# Patient Record
Sex: MALE | Race: WHITE | NOT HISPANIC OR LATINO
[De-identification: names, ages, dates, MRNs, and addresses within clinical notes are randomized per-mention and may not be internally consistent; named-entity substitution may affect disease eponyms.]

---

## 2024-03-26 ENCOUNTER — TRANSCRIPTION ENCOUNTER (OUTPATIENT)
Age: 69
End: 2024-03-26

## 2024-03-26 ENCOUNTER — INPATIENT (INPATIENT)
Facility: HOSPITAL | Age: 69
LOS: 7 days | Discharge: HOME HEALTH | DRG: 617 | End: 2024-04-03
Attending: INTERNAL MEDICINE | Admitting: GENERAL ACUTE CARE HOSPITAL
Payer: MEDICARE

## 2024-03-26 VITALS
TEMPERATURE: 98 F | SYSTOLIC BLOOD PRESSURE: 130 MMHG | HEART RATE: 87 BPM | WEIGHT: 229.94 LBS | RESPIRATION RATE: 17 BRPM | DIASTOLIC BLOOD PRESSURE: 57 MMHG | HEIGHT: 65 IN | OXYGEN SATURATION: 100 %

## 2024-03-26 DIAGNOSIS — E87.20 ACIDOSIS, UNSPECIFIED: ICD-10-CM

## 2024-03-26 DIAGNOSIS — M86.9 OSTEOMYELITIS, UNSPECIFIED: ICD-10-CM

## 2024-03-26 DIAGNOSIS — Z29.9 ENCOUNTER FOR PROPHYLACTIC MEASURES, UNSPECIFIED: ICD-10-CM

## 2024-03-26 DIAGNOSIS — Z89.429 ACQUIRED ABSENCE OF OTHER TOE(S), UNSPECIFIED SIDE: Chronic | ICD-10-CM

## 2024-03-26 DIAGNOSIS — I73.9 PERIPHERAL VASCULAR DISEASE, UNSPECIFIED: ICD-10-CM

## 2024-03-26 DIAGNOSIS — I48.0 PAROXYSMAL ATRIAL FIBRILLATION: ICD-10-CM

## 2024-03-26 DIAGNOSIS — E11.9 TYPE 2 DIABETES MELLITUS WITHOUT COMPLICATIONS: ICD-10-CM

## 2024-03-26 DIAGNOSIS — J44.9 CHRONIC OBSTRUCTIVE PULMONARY DISEASE, UNSPECIFIED: ICD-10-CM

## 2024-03-26 DIAGNOSIS — Z95.2 PRESENCE OF PROSTHETIC HEART VALVE: ICD-10-CM

## 2024-03-26 DIAGNOSIS — N18.9 CHRONIC KIDNEY DISEASE, UNSPECIFIED: ICD-10-CM

## 2024-03-26 DIAGNOSIS — Z85.038 PERSONAL HISTORY OF OTHER MALIGNANT NEOPLASM OF LARGE INTESTINE: ICD-10-CM

## 2024-03-26 DIAGNOSIS — N17.9 ACUTE KIDNEY FAILURE, UNSPECIFIED: ICD-10-CM

## 2024-03-26 DIAGNOSIS — D64.9 ANEMIA, UNSPECIFIED: ICD-10-CM

## 2024-03-26 DIAGNOSIS — N18.5 CHRONIC KIDNEY DISEASE, STAGE 5: ICD-10-CM

## 2024-03-26 DIAGNOSIS — I48.20 CHRONIC ATRIAL FIBRILLATION, UNSPECIFIED: ICD-10-CM

## 2024-03-26 LAB
A1C WITH ESTIMATED AVERAGE GLUCOSE RESULT: 5 % — SIGNIFICANT CHANGE UP (ref 4–5.6)
ADD ON TEST-SPECIMEN IN LAB: SIGNIFICANT CHANGE UP
ALBUMIN SERPL ELPH-MCNC: 3.5 G/DL — SIGNIFICANT CHANGE UP (ref 3.3–5)
ALP SERPL-CCNC: 78 U/L — SIGNIFICANT CHANGE UP (ref 40–120)
ALT FLD-CCNC: 13 U/L — SIGNIFICANT CHANGE UP (ref 10–45)
ANION GAP SERPL CALC-SCNC: 13 MMOL/L — SIGNIFICANT CHANGE UP (ref 5–17)
ANION GAP SERPL CALC-SCNC: 16 MMOL/L — SIGNIFICANT CHANGE UP (ref 5–17)
AST SERPL-CCNC: 18 U/L — SIGNIFICANT CHANGE UP (ref 10–40)
BASOPHILS # BLD AUTO: 0.03 K/UL — SIGNIFICANT CHANGE UP (ref 0–0.2)
BASOPHILS NFR BLD AUTO: 0.3 % — SIGNIFICANT CHANGE UP (ref 0–2)
BILIRUB SERPL-MCNC: 0.2 MG/DL — SIGNIFICANT CHANGE UP (ref 0.2–1.2)
BLD GP AB SCN SERPL QL: NEGATIVE — SIGNIFICANT CHANGE UP
BUN SERPL-MCNC: 77 MG/DL — HIGH (ref 7–23)
BUN SERPL-MCNC: 81 MG/DL — HIGH (ref 7–23)
CALCIUM SERPL-MCNC: 8.6 MG/DL — SIGNIFICANT CHANGE UP (ref 8.4–10.5)
CALCIUM SERPL-MCNC: 8.6 MG/DL — SIGNIFICANT CHANGE UP (ref 8.4–10.5)
CHLORIDE SERPL-SCNC: 102 MMOL/L — SIGNIFICANT CHANGE UP (ref 96–108)
CHLORIDE SERPL-SCNC: 106 MMOL/L — SIGNIFICANT CHANGE UP (ref 96–108)
CO2 SERPL-SCNC: 14 MMOL/L — LOW (ref 22–31)
CO2 SERPL-SCNC: 16 MMOL/L — LOW (ref 22–31)
CREAT SERPL-MCNC: 3.97 MG/DL — HIGH (ref 0.5–1.3)
CREAT SERPL-MCNC: 4.2 MG/DL — HIGH (ref 0.5–1.3)
CRP SERPL-MCNC: 43.3 MG/L — HIGH (ref 0–4)
CRP SERPL-MCNC: 46.5 MG/L — HIGH (ref 0–4)
EGFR: 15 ML/MIN/1.73M2 — LOW
EGFR: 16 ML/MIN/1.73M2 — LOW
EOSINOPHIL # BLD AUTO: 0.09 K/UL — SIGNIFICANT CHANGE UP (ref 0–0.5)
EOSINOPHIL NFR BLD AUTO: 0.9 % — SIGNIFICANT CHANGE UP (ref 0–6)
ERYTHROCYTE [SEDIMENTATION RATE] IN BLOOD: 87 MM/HR — HIGH
ESTIMATED AVERAGE GLUCOSE: 97 MG/DL — SIGNIFICANT CHANGE UP (ref 68–114)
GLUCOSE BLDC GLUCOMTR-MCNC: 120 MG/DL — HIGH (ref 70–99)
GLUCOSE BLDC GLUCOMTR-MCNC: 129 MG/DL — HIGH (ref 70–99)
GLUCOSE BLDC GLUCOMTR-MCNC: 162 MG/DL — HIGH (ref 70–99)
GLUCOSE BLDC GLUCOMTR-MCNC: 177 MG/DL — HIGH (ref 70–99)
GLUCOSE SERPL-MCNC: 107 MG/DL — HIGH (ref 70–99)
GLUCOSE SERPL-MCNC: 133 MG/DL — HIGH (ref 70–99)
HCT VFR BLD CALC: 24 % — LOW (ref 39–50)
HCT VFR BLD CALC: 24.6 % — LOW (ref 39–50)
HGB BLD-MCNC: 7.5 G/DL — LOW (ref 13–17)
HGB BLD-MCNC: 7.8 G/DL — LOW (ref 13–17)
IMM GRANULOCYTES NFR BLD AUTO: 0.5 % — SIGNIFICANT CHANGE UP (ref 0–0.9)
LACTATE SERPL-SCNC: 0.5 MMOL/L — SIGNIFICANT CHANGE UP (ref 0.5–2)
LYMPHOCYTES # BLD AUTO: 2.67 K/UL — SIGNIFICANT CHANGE UP (ref 1–3.3)
LYMPHOCYTES # BLD AUTO: 25.5 % — SIGNIFICANT CHANGE UP (ref 13–44)
MAGNESIUM SERPL-MCNC: 1.9 MG/DL — SIGNIFICANT CHANGE UP (ref 1.6–2.6)
MCHC RBC-ENTMCNC: 28.1 PG — SIGNIFICANT CHANGE UP (ref 27–34)
MCHC RBC-ENTMCNC: 28.6 PG — SIGNIFICANT CHANGE UP (ref 27–34)
MCHC RBC-ENTMCNC: 31.3 GM/DL — LOW (ref 32–36)
MCHC RBC-ENTMCNC: 31.7 GM/DL — LOW (ref 32–36)
MCV RBC AUTO: 89.9 FL — SIGNIFICANT CHANGE UP (ref 80–100)
MCV RBC AUTO: 90.1 FL — SIGNIFICANT CHANGE UP (ref 80–100)
MONOCYTES # BLD AUTO: 1.02 K/UL — HIGH (ref 0–0.9)
MONOCYTES NFR BLD AUTO: 9.8 % — SIGNIFICANT CHANGE UP (ref 2–14)
NEUTROPHILS # BLD AUTO: 6.6 K/UL — SIGNIFICANT CHANGE UP (ref 1.8–7.4)
NEUTROPHILS NFR BLD AUTO: 63 % — SIGNIFICANT CHANGE UP (ref 43–77)
NRBC # BLD: 0 /100 WBCS — SIGNIFICANT CHANGE UP (ref 0–0)
NRBC # BLD: 0 /100 WBCS — SIGNIFICANT CHANGE UP (ref 0–0)
PHOSPHATE SERPL-MCNC: 5.9 MG/DL — HIGH (ref 2.5–4.5)
PLATELET # BLD AUTO: 197 K/UL — SIGNIFICANT CHANGE UP (ref 150–400)
PLATELET # BLD AUTO: 201 K/UL — SIGNIFICANT CHANGE UP (ref 150–400)
POTASSIUM SERPL-MCNC: 3.4 MMOL/L — LOW (ref 3.5–5.3)
POTASSIUM SERPL-MCNC: 3.7 MMOL/L — SIGNIFICANT CHANGE UP (ref 3.5–5.3)
POTASSIUM SERPL-SCNC: 3.4 MMOL/L — LOW (ref 3.5–5.3)
POTASSIUM SERPL-SCNC: 3.7 MMOL/L — SIGNIFICANT CHANGE UP (ref 3.5–5.3)
PROT SERPL-MCNC: 6.5 G/DL — SIGNIFICANT CHANGE UP (ref 6–8.3)
RBC # BLD: 2.67 M/UL — LOW (ref 4.2–5.8)
RBC # BLD: 2.73 M/UL — LOW (ref 4.2–5.8)
RBC # FLD: 14 % — SIGNIFICANT CHANGE UP (ref 10.3–14.5)
RBC # FLD: 14 % — SIGNIFICANT CHANGE UP (ref 10.3–14.5)
RH IG SCN BLD-IMP: POSITIVE — SIGNIFICANT CHANGE UP
SODIUM SERPL-SCNC: 132 MMOL/L — LOW (ref 135–145)
SODIUM SERPL-SCNC: 135 MMOL/L — SIGNIFICANT CHANGE UP (ref 135–145)
WBC # BLD: 10.46 K/UL — SIGNIFICANT CHANGE UP (ref 3.8–10.5)
WBC # BLD: 10.66 K/UL — HIGH (ref 3.8–10.5)
WBC # FLD AUTO: 10.46 K/UL — SIGNIFICANT CHANGE UP (ref 3.8–10.5)
WBC # FLD AUTO: 10.66 K/UL — HIGH (ref 3.8–10.5)

## 2024-03-26 PROCEDURE — 93010 ELECTROCARDIOGRAM REPORT: CPT

## 2024-03-26 PROCEDURE — 73700 CT LOWER EXTREMITY W/O DYE: CPT | Mod: 26,RT,MC

## 2024-03-26 PROCEDURE — 93970 EXTREMITY STUDY: CPT | Mod: 26

## 2024-03-26 PROCEDURE — 73620 X-RAY EXAM OF FOOT: CPT | Mod: 26,RT

## 2024-03-26 PROCEDURE — 99223 1ST HOSP IP/OBS HIGH 75: CPT | Mod: GC

## 2024-03-26 PROCEDURE — 88311 DECALCIFY TISSUE: CPT | Mod: 26

## 2024-03-26 PROCEDURE — 93925 LOWER EXTREMITY STUDY: CPT | Mod: 26

## 2024-03-26 PROCEDURE — 73718 MRI LOWER EXTREMITY W/O DYE: CPT | Mod: 26,RT

## 2024-03-26 PROCEDURE — 73630 X-RAY EXAM OF FOOT: CPT | Mod: 26,LT

## 2024-03-26 PROCEDURE — 88307 TISSUE EXAM BY PATHOLOGIST: CPT | Mod: 26

## 2024-03-26 PROCEDURE — 99285 EMERGENCY DEPT VISIT HI MDM: CPT

## 2024-03-26 RX ORDER — METOPROLOL TARTRATE 50 MG
1 TABLET ORAL
Refills: 0 | DISCHARGE

## 2024-03-26 RX ORDER — INFLUENZA VIRUS VACCINE 15; 15; 15; 15 UG/.5ML; UG/.5ML; UG/.5ML; UG/.5ML
0.7 SUSPENSION INTRAMUSCULAR ONCE
Refills: 0 | Status: COMPLETED | OUTPATIENT
Start: 2024-03-26 | End: 2024-03-26

## 2024-03-26 RX ORDER — CLOPIDOGREL BISULFATE 75 MG/1
1 TABLET, FILM COATED ORAL
Refills: 0 | DISCHARGE

## 2024-03-26 RX ORDER — VANCOMYCIN HCL 1 G
1500 VIAL (EA) INTRAVENOUS ONCE
Refills: 0 | Status: COMPLETED | OUTPATIENT
Start: 2024-03-26 | End: 2024-03-26

## 2024-03-26 RX ORDER — SODIUM CHLORIDE 9 MG/ML
1000 INJECTION, SOLUTION INTRAVENOUS
Refills: 0 | Status: DISCONTINUED | OUTPATIENT
Start: 2024-03-26 | End: 2024-03-29

## 2024-03-26 RX ORDER — DEXTROSE 50 % IN WATER 50 %
25 SYRINGE (ML) INTRAVENOUS ONCE
Refills: 0 | Status: DISCONTINUED | OUTPATIENT
Start: 2024-03-26 | End: 2024-03-29

## 2024-03-26 RX ORDER — SERTRALINE 25 MG/1
1 TABLET, FILM COATED ORAL
Refills: 0 | DISCHARGE

## 2024-03-26 RX ORDER — ACETAMINOPHEN 500 MG
650 TABLET ORAL EVERY 6 HOURS
Refills: 0 | Status: DISCONTINUED | OUTPATIENT
Start: 2024-03-26 | End: 2024-03-26

## 2024-03-26 RX ORDER — LINEZOLID 600 MG/300ML
600 INJECTION, SOLUTION INTRAVENOUS EVERY 12 HOURS
Refills: 0 | Status: DISCONTINUED | OUTPATIENT
Start: 2024-03-26 | End: 2024-03-26

## 2024-03-26 RX ORDER — APIXABAN 2.5 MG/1
5 TABLET, FILM COATED ORAL
Refills: 0 | Status: DISCONTINUED | OUTPATIENT
Start: 2024-03-26 | End: 2024-03-29

## 2024-03-26 RX ORDER — INSULIN LISPRO 100/ML
VIAL (ML) SUBCUTANEOUS AT BEDTIME
Refills: 0 | Status: DISCONTINUED | OUTPATIENT
Start: 2024-03-26 | End: 2024-03-29

## 2024-03-26 RX ORDER — FUROSEMIDE 40 MG
1 TABLET ORAL
Refills: 0 | DISCHARGE

## 2024-03-26 RX ORDER — INSULIN LISPRO 100/ML
VIAL (ML) SUBCUTANEOUS
Refills: 0 | Status: DISCONTINUED | OUTPATIENT
Start: 2024-03-26 | End: 2024-03-29

## 2024-03-26 RX ORDER — ROSUVASTATIN CALCIUM 5 MG/1
1 TABLET ORAL
Refills: 0 | DISCHARGE

## 2024-03-26 RX ORDER — CLOPIDOGREL BISULFATE 75 MG/1
75 TABLET, FILM COATED ORAL DAILY
Refills: 0 | Status: DISCONTINUED | OUTPATIENT
Start: 2024-03-26 | End: 2024-03-29

## 2024-03-26 RX ORDER — SERTRALINE 25 MG/1
50 TABLET, FILM COATED ORAL EVERY 24 HOURS
Refills: 0 | Status: DISCONTINUED | OUTPATIENT
Start: 2024-03-26 | End: 2024-03-26

## 2024-03-26 RX ORDER — LINEZOLID 600 MG/300ML
600 INJECTION, SOLUTION INTRAVENOUS EVERY 12 HOURS
Refills: 0 | Status: COMPLETED | OUTPATIENT
Start: 2024-03-26 | End: 2024-03-27

## 2024-03-26 RX ORDER — APIXABAN 2.5 MG/1
1 TABLET, FILM COATED ORAL
Refills: 0 | DISCHARGE

## 2024-03-26 RX ORDER — METOPROLOL TARTRATE 50 MG
25 TABLET ORAL DAILY
Refills: 0 | Status: DISCONTINUED | OUTPATIENT
Start: 2024-03-26 | End: 2024-03-27

## 2024-03-26 RX ORDER — SODIUM BICARBONATE 1 MEQ/ML
650 SYRINGE (ML) INTRAVENOUS EVERY 12 HOURS
Refills: 0 | Status: DISCONTINUED | OUTPATIENT
Start: 2024-03-26 | End: 2024-03-29

## 2024-03-26 RX ORDER — PIPERACILLIN AND TAZOBACTAM 4; .5 G/20ML; G/20ML
3.38 INJECTION, POWDER, LYOPHILIZED, FOR SOLUTION INTRAVENOUS ONCE
Refills: 0 | Status: COMPLETED | OUTPATIENT
Start: 2024-03-26 | End: 2024-03-26

## 2024-03-26 RX ORDER — GLUCAGON INJECTION, SOLUTION 0.5 MG/.1ML
1 INJECTION, SOLUTION SUBCUTANEOUS ONCE
Refills: 0 | Status: DISCONTINUED | OUTPATIENT
Start: 2024-03-26 | End: 2024-03-29

## 2024-03-26 RX ORDER — HEPARIN SODIUM 5000 [USP'U]/ML
5000 INJECTION INTRAVENOUS; SUBCUTANEOUS EVERY 8 HOURS
Refills: 0 | Status: DISCONTINUED | OUTPATIENT
Start: 2024-03-26 | End: 2024-03-26

## 2024-03-26 RX ORDER — APIXABAN 2.5 MG/1
5 TABLET, FILM COATED ORAL EVERY 12 HOURS
Refills: 0 | Status: DISCONTINUED | OUTPATIENT
Start: 2024-03-26 | End: 2024-03-26

## 2024-03-26 RX ORDER — HEPARIN SODIUM 5000 [USP'U]/ML
7500 INJECTION INTRAVENOUS; SUBCUTANEOUS EVERY 8 HOURS
Refills: 0 | Status: DISCONTINUED | OUTPATIENT
Start: 2024-03-26 | End: 2024-03-26

## 2024-03-26 RX ORDER — PIPERACILLIN AND TAZOBACTAM 4; .5 G/20ML; G/20ML
4.5 INJECTION, POWDER, LYOPHILIZED, FOR SOLUTION INTRAVENOUS EVERY 12 HOURS
Refills: 0 | Status: DISCONTINUED | OUTPATIENT
Start: 2024-03-26 | End: 2024-03-26

## 2024-03-26 RX ORDER — ATORVASTATIN CALCIUM 80 MG/1
80 TABLET, FILM COATED ORAL AT BEDTIME
Refills: 0 | Status: DISCONTINUED | OUTPATIENT
Start: 2024-03-26 | End: 2024-03-29

## 2024-03-26 RX ORDER — SODIUM CHLORIDE 9 MG/ML
500 INJECTION, SOLUTION INTRAVENOUS ONCE
Refills: 0 | Status: COMPLETED | OUTPATIENT
Start: 2024-03-26 | End: 2024-03-26

## 2024-03-26 RX ORDER — PIPERACILLIN AND TAZOBACTAM 4; .5 G/20ML; G/20ML
3.38 INJECTION, POWDER, LYOPHILIZED, FOR SOLUTION INTRAVENOUS EVERY 12 HOURS
Refills: 0 | Status: DISCONTINUED | OUTPATIENT
Start: 2024-03-26 | End: 2024-03-29

## 2024-03-26 RX ORDER — ACETAMINOPHEN 500 MG
975 TABLET ORAL EVERY 8 HOURS
Refills: 0 | Status: DISCONTINUED | OUTPATIENT
Start: 2024-03-26 | End: 2024-03-29

## 2024-03-26 RX ORDER — LINEZOLID 600 MG/300ML
600 INJECTION, SOLUTION INTRAVENOUS EVERY 12 HOURS
Refills: 0 | Status: DISCONTINUED | OUTPATIENT
Start: 2024-03-27 | End: 2024-03-29

## 2024-03-26 RX ORDER — DEXTROSE 50 % IN WATER 50 %
12.5 SYRINGE (ML) INTRAVENOUS ONCE
Refills: 0 | Status: DISCONTINUED | OUTPATIENT
Start: 2024-03-26 | End: 2024-03-29

## 2024-03-26 RX ORDER — DEXTROSE 50 % IN WATER 50 %
15 SYRINGE (ML) INTRAVENOUS ONCE
Refills: 0 | Status: DISCONTINUED | OUTPATIENT
Start: 2024-03-26 | End: 2024-03-29

## 2024-03-26 RX ADMIN — Medication 650 MILLIGRAM(S): at 09:27

## 2024-03-26 RX ADMIN — PIPERACILLIN AND TAZOBACTAM 25 GRAM(S): 4; .5 INJECTION, POWDER, LYOPHILIZED, FOR SOLUTION INTRAVENOUS at 12:35

## 2024-03-26 RX ADMIN — Medication 2: at 09:45

## 2024-03-26 RX ADMIN — PIPERACILLIN AND TAZOBACTAM 200 GRAM(S): 4; .5 INJECTION, POWDER, LYOPHILIZED, FOR SOLUTION INTRAVENOUS at 02:45

## 2024-03-26 RX ADMIN — Medication 650 MILLIGRAM(S): at 23:04

## 2024-03-26 RX ADMIN — Medication 975 MILLIGRAM(S): at 23:04

## 2024-03-26 RX ADMIN — SODIUM CHLORIDE 1000 MILLILITER(S): 9 INJECTION, SOLUTION INTRAVENOUS at 07:13

## 2024-03-26 RX ADMIN — APIXABAN 5 MILLIGRAM(S): 2.5 TABLET, FILM COATED ORAL at 19:57

## 2024-03-26 RX ADMIN — PIPERACILLIN AND TAZOBACTAM 25 GRAM(S): 4; .5 INJECTION, POWDER, LYOPHILIZED, FOR SOLUTION INTRAVENOUS at 23:03

## 2024-03-26 RX ADMIN — ATORVASTATIN CALCIUM 80 MILLIGRAM(S): 80 TABLET, FILM COATED ORAL at 23:07

## 2024-03-26 RX ADMIN — HEPARIN SODIUM 7500 UNIT(S): 5000 INJECTION INTRAVENOUS; SUBCUTANEOUS at 12:35

## 2024-03-26 RX ADMIN — LINEZOLID 600 MILLIGRAM(S): 600 INJECTION, SOLUTION INTRAVENOUS at 19:57

## 2024-03-26 NOTE — ED PROVIDER NOTE - CLINICAL SUMMARY MEDICAL DECISION MAKING FREE TEXT BOX
68-year-old male with likely osteomyelitis we will plan for x-ray CT without basic labs likely admission IV antibiotics     CBC CMP CRP x-ray foot CT foot IV antibiotics

## 2024-03-26 NOTE — H&P ADULT - PROBLEM SELECTOR PROBLEM 5
COPD without exacerbation H/O aortic valve replacement Paroxysmal atrial fibrillation Anemia Type 2 diabetes mellitus

## 2024-03-26 NOTE — H&P ADULT - ASSESSMENT
Patient is a 69yo M wiht PMH of DM, diabetic wounds s/p left toe amputations, COPD (on home 2L, on BIPAP overnight), CKD (unknown baseline Cr), and colon cancer (w/ colostomy bag) who presents with right toe pain/wound for the past 6-7 weeks, admitted for osteomyelitis of R foot.

## 2024-03-26 NOTE — H&P ADULT - PROBLEM SELECTOR PROBLEM 7
Prophylactic measure COPD without exacerbation Peripheral arterial disease Chronic atrial fibrillation H/O aortic valve replacement

## 2024-03-26 NOTE — PATIENT PROFILE ADULT - FALL HARM RISK - HARM RISK INTERVENTIONS
Assistance with ambulation/Assistance OOB with selected safe patient handling equipment/Communicate Risk of Fall with Harm to all staff/Discuss with provider need for PT consult/Monitor gait and stability/Reinforce activity limits and safety measures with patient and family/Tailored Fall Risk Interventions/Visual Cue: Yellow wristband and red socks/Bed in lowest position, wheels locked, appropriate side rails in place/Call bell, personal items and telephone in reach/Instruct patient to call for assistance before getting out of bed or chair/Non-slip footwear when patient is out of bed/[place] to call system/Physically safe environment - no spills, clutter or unnecessary equipment/Purposeful Proactive Rounding/Room/bathroom lighting operational, light cord in reach

## 2024-03-26 NOTE — H&P ADULT - NSHPLABSRESULTS_GEN_ALL_CORE
7.8    10.66 )-----------( 197      ( 26 Mar 2024 00:45 )             24.6       03-26    135  |  106  |  81<H>  ----------------------------<  107<H>  3.7   |  16<L>  |  4.20<H>    Ca    8.6      26 Mar 2024 00:45    TPro  6.5  /  Alb  3.5  /  TBili  0.2  /  DBili  x   /  AST  18  /  ALT  13  /  AlkPhos  78  03-26              Urinalysis Basic - ( 26 Mar 2024 00:45 )    Color: x / Appearance: x / SG: x / pH: x  Gluc: 107 mg/dL / Ketone: x  / Bili: x / Urobili: x   Blood: x / Protein: x / Nitrite: x   Leuk Esterase: x / RBC: x / WBC x   Sq Epi: x / Non Sq Epi: x / Bacteria: x            Lactate Trend  03-26 @ 00:45 Lactate:0.5             CAPILLARY BLOOD GLUCOSE      POCT Blood Glucose.: 135 mg/dL (26 Mar 2024 00:28)

## 2024-03-26 NOTE — ED PROVIDER NOTE - PHYSICAL EXAMINATION
VITAL SIGNS: I have reviewed nursing notes and confirm.  CONSTITUTIONAL: Well appearing, in no acute distress. On O2  SKIN:  warm and dry, no acute rash.   HEAD:  normocephalic, atraumatic.  EYES: EOM intact; conjunctiva and sclera clear.  ENT: No nasal discharge; airway clear.   NECK: Supple.  CARD: S1, S2, Regular rate and rhythm.   RESP:  Clear to auscultation b/l, no wheezes, rales or rhonchi. in no apparent distress is on nasal cannula O2 3 L  ABD: Normal bowel sounds; soft; non-distended; non-tender; no guarding/ rebound.  EXT: Normal ROM. No peripheral edema. Pulses intact and equal b/l. right foot fifth metatarsal erythema with eschar tender to touch fourth fifth metatarsals  NEURO: Alert, oriented, grossly unremarkable  PSYCH: Cooperative, mood and affect appropriate.

## 2024-03-26 NOTE — H&P ADULT - PROBLEM SELECTOR PLAN 11
Hx of COPD, on 2L O2 at home.   No s/s of exacerbation on this admission, satting 100% on home O2. Uses CPAP at nighttime.   - c/w home O2  - c/w CPAP at nighttime F: s/p 500 cc LR  E: Replete as needed, caution due to CKD   N: consistent carb  D: Plavix and Eliquis   Dispo: [facility]

## 2024-03-26 NOTE — H&P ADULT - PROBLEM SELECTOR PLAN 3
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 No s/s of active bleeding.   - obtain collateral on baseline levels  - add on iron studies  - maintain active T&S  - transfuse for hgb < 7 Likely AOCD iso chronic kidney disease. No s/s of active bleeding. Per old lab review, Hgb was 9 in 1/2024.     - F/u iron studies  - maintain active T&S  - transfuse for hgb < 7 As above  -Renal consulted Bicarb 16 on admission-> 14. GAP 13 -> 16    - CTM  - Renal consulted, f/u recs

## 2024-03-26 NOTE — H&P ADULT - PROBLEM SELECTOR PLAN 7
F: none  E: replete as needed  N: renal diet  DVT ppx: heparin  Activity: ambulate with assistance  Dispo: [facility] Hx of COPD, on 2L O2 at home.   No s/s of exacerbation on this admission, satting 100% on home O2. Uses CPAP at nighttime.   - c/w home O2  - c/w CPAP at nighttime History of right lower extremity angioplasty in 1/2024.   Per collateral  from Pt's Vascular Surgeon, Pt had a balloon Angioplasty in Proximal AT abd below the knee popliteal   as started on Plavix since with 1 conventional and 1 drug eluting stent.   Home meds: Plavix History of right lower extremity angioplasty in 1/2024.   Per collateral  from Pt's Vascular Surgeon, Pt had a balloon Angioplasty in Proximal AT abd below the knee popliteal   as started on Plavix since with 1 conventional and 1 drug eluting stent.   Home meds: Plavix 75 mg qd    - Hold for now. Will resume if Hgb stable on repeat cbc. Per pt, history of aortic valve replacement ~1-2 years ago.   Unclear about further cardiac history. Pt states he takes Eliquis and Plavix at home. States he took Plavix yesterday (3/25) and stopped plavix 3 days ago.  Upon more collatoral from pt's Cardiologist, Pt has history of MI 1-2 years ago Is on the Eliquis for paraoxysmal afib.   Cardiologist Dr. Adams,    - Plan as above

## 2024-03-26 NOTE — H&P ADULT - PROBLEM SELECTOR PLAN 1
#Osteomyelitis R foot  Patient c/o right foot pain x 6-7 weeks.  Hx of diabetic foot infection with prior left toe amputation.  Follows with vascular at [facility] for PAD.  On augmentin outpatient x 2 weeks without improvement.  Labs significant for elevated ESR/CRP, mild leukocytosis.  XR concerning for OM.  Podiatry consulted. Given zosyn 3.375g x 1 in the ED. Concern for prior allergy to vancomycin.  - f/u blood cultures  - f/u XR final read  - f/u CT R foot  - daptomycin 650mg IV x 1 given vanc allergy, pending ID recs  - c/w zosyn w/ pseudomonal dosing   - ID consult in the AM  - f/u podiatry recs #Osteomyelitis R foot  Patient c/o right foot pain x 6-7 weeks.  Hx of diabetic foot infection with prior left toe amputation.  Follows with vascular at [facility] for PAD.  On doxycycline outpatient x 2 weeks without improvement.  On podiatry exam, erythema around R lateral foot wound extending proximally to met base and dorsally to 5th metatarsal; Right lateral 5th metatarsal head with 3 x 2 cm wound with overlying eschar, neg PTB or fluctuance  Labs significant for elevated ESR/CRP, mild leukocytosis.  XR concerning for OM.  Podiatry consulted. Given zosyn 3.375g x 1 in the ED. Concern for prior allergy to vancomycin.  - f/u blood cultures  - f/u XR R foot final read  - f/u XR L foot  - f/u CT R foot  - hold off on vancomycin or dapto given unclear allergy history and patient stable   - c/w zosyn w/ pseudomonal dosing   - ID consult in the AM  - f/u podiatry recs #Osteomyelitis R foot  Patient c/o right foot pain x 6-7 weeks.  Hx of diabetic foot infection with prior left toe amputation.  Follows with vascular at [facility] for PAD.  On doxycycline outpatient x 2 weeks without improvement.  On podiatry exam, erythema around R lateral foot wound extending proximally to met base and dorsally to 5th metatarsal; Right lateral 5th metatarsal head with 3 x 2 cm wound with overlying eschar, neg PTB or fluctuance  Labs significant for elevated ESR/CRP, mild leukocytosis.  Podiatry consulted. Given zosyn 3.375g x 1 in the ED. Concern for prior allergy to vancomycin vs Daptomycin?   - f/u blood cultures  - f/u MRI   - XR R foot: No convincing plain radiographic evidence for acute osteomyelitis.  - XR L foot : No tracking gas collections or gross radiographic evidence for osteomyelitis however if concern persists MRI suggested to further assess.  - CT R foot: No CT evidence of osteomyelitis. However, MRI would be a more sensitive test to evaluate for osteomyelitis.  - hold off on vancomycin or dapto given unclear allergy history and patient stable   - c/w zosyn w/ pseudomonal dosing   - f/u podiatry recs #Osteomyelitis R foot  Patient c/o right foot pain x 6-7 weeks.  Hx of diabetic foot infection with prior left toe amputation.  Follows with vascular at [facility] for PAD.  On doxycycline outpatient x 2 weeks without improvement.  On podiatry exam, erythema around R lateral foot wound extending proximally to met base and dorsally to 5th metatarsal; Right lateral 5th metatarsal head with 3 x 2 cm wound with overlying eschar, neg PTB or fluctuance  Labs significant for elevated ESR/CRP, mild leukocytosis.  Podiatry consulted. Given zosyn 3.375g x 1 in the ED. Concern for prior allergy to vancomycin vs Daptomycin?   - f/u blood cultures  - f/u MRI   - XR R foot: No convincing plain radiographic evidence for acute osteomyelitis.  - XR L foot : No tracking gas collections or gross radiographic evidence for osteomyelitis however if concern persists MRI suggested to further assess.  - CT R foot: No CT evidence of osteomyelitis. However, MRI would be a more sensitive test to evaluate for osteomyelitis.  - hold off on vancomycin or dapto given unclear allergy history and patient stable   - c/w zosyn w/ pseudomonal dosing   -  f/u podiatry recs

## 2024-03-26 NOTE — ED PROVIDER NOTE - PROGRESS NOTE DETAILS
x-ray with osteo diffuse erosion Charcot's foot podiatry consulted Podiatry at the bedside endorsed [facility] admitted

## 2024-03-26 NOTE — H&P ADULT - PROBLEM SELECTOR PROBLEM 2
Acute kidney injury superimposed on CKD Chronic kidney disease Stage 5 chronic kidney disease not on chronic dialysis

## 2024-03-26 NOTE — H&P ADULT - PROBLEM SELECTOR PLAN 6
Hx of colon cancer, now with colostomy.   - no active interventions History of right lower extremity angioplasty in 1/2024.   Per collateral  from Pt's Vascular Surgeon, was started on Plavix since. Per pt, history of aortic valve replacement ~1-2 years ago.   Unclear about further cardiac history. Pt states he takes Eliquis and Plavix at home. States he took Plavix yesterday (3/25) and stopped plavix 3 days ago.  Upon more collatoral from pt's Cardiologist, Pt has history of MI 1-2 years ago Is on the Eliquis for paraoxysmal afib.   Cardiologist Dr. Adams,      -Obtain more collateral on Cardiac history. Per pt, history of aortic valve replacement ~1-2 years ago.   Unclear about further cardiac history. Pt states he takes Eliquis and Plavix at home. States he took Plavix yesterday (3/25) and stopped plavix 3 days ago.  Upon more collatoral from pt's Cardiologist, Pt has history of MI 1-2 years ago Is on the Eliquis for paraoxysmal afib.   Cardiologist Dr. Adams,    - Plan as above Patient with hx of diabetes, reportedly last A1c 5. Self-titrates home insulin doses based on blood sugar, but unable to name how much he takes on average.   - A1c 5 (3/26)    - monitor FSG  - moderate ISS Pt with h/o of paroxysmal AFib per Cardiologist. Currently rate controlled.   Home meds: Eliquis 5 bid, coreg 6.25 mg bid     -Pt's Hgb slowly downtrending. Will get repeat PM cbc and if stable restart home meds Pt with h/o of paroxysmal AFib per Cardiologist. Currently rate controlled.   Home meds: Eliquis 5 bid, coreg 6.25 mg bid     - c/w eliquis  - hold coreg for now

## 2024-03-26 NOTE — H&P ADULT - NSICDXPASTMEDICALHX_GEN_ALL_CORE_FT
PAST MEDICAL HISTORY:  CAD (coronary artery disease)     Chronic kidney disease (CKD)     COPD with hypoxia     Diabetic foot ulcers     PAD (peripheral artery disease)     Type 2 diabetes mellitus

## 2024-03-26 NOTE — ED ADULT NURSE NOTE - NS ED NURSE RECORD ANOTHER VITAL SIGN
Impression: Arcus senilis, bilateral: H18.413. Plan: Discussed diagnosis in detail with patient. No treatment is required at this time. Reassured patient of current condition and treatment. Will continue to observe condition and or symptoms. Yes

## 2024-03-26 NOTE — ED ADULT NURSE NOTE - OBJECTIVE STATEMENT
Pt. presents to the ED complaining of right 5th toe pain  Rct. dx with Osteo and on Augmentin  Pt. sent to ED for IV ABX

## 2024-03-26 NOTE — H&P ADULT - PROBLEM SELECTOR PLAN 5
Hx of COPD, on 2L O2 at home.   No s/s of exacerbation on this admission, satting 100% on home O2. Hx of COPD, on 2L O2 at home.   No s/s of exacerbation on this admission, satting 100% on home O2. Uses CPAP at nighttime.   - c/w home O2  - c/w CPAP at nighttime Per pt, history of aortic valve replacement ~2 years ago.   Unclear about further cardiac history. Pt states he takes Eliquis and Plavix at home. States he took Plavix yesterday (3/25) and stopped plavix 3 days ago.    -Obtain more collateral on Cardiac history. Pt with h/o of paroxysmal AFib per Cardiologist. Currently rate controlled.   Home meds: Eliquis Pt with h/o of paroxysmal AFib per Cardiologist. Currently rate controlled.   Home meds: Eliquis 5 bid, coreg 6.25 mg bid     -Pt's Hgb slowly downtrending. Will get repeat PM cbc and if stable restart home meds Likely AOCD iso chronic kidney disease. No s/s of active bleeding. Per old lab review, Hgb was 9 in 1/2024.     - F/u iron studies  - maintain active T&S  - transfuse for hgb < 7 Patient with hx of diabetes, reportedly last A1c 5. Self-titrates home insulin doses based on blood sugar, but unable to name how much he takes on average.   - A1c 5 (3/26)    - monitor FSG  - moderate ISS

## 2024-03-26 NOTE — ED PROVIDER NOTE - OBJECTIVE STATEMENT
68-year-old male past medical history of peripheral vascular disease insulin-dependent diabetes mellitus COPD on home O2 sleeps with BiPAP normally sees vascular at [place] but would like to see vascular here has a specific physician of [facility], here today for right foot pain has been on Augmentin for right toe infection for the past 2 weeks had an x-ray 3 days ago which showed osteomyelitis was  told to go to the nearest ED for admission and antibiotics.  Denies associated fever chills or injury to the foot.  No cough c  Abdominal pain nausea vomiting diarrhea or fever chills

## 2024-03-26 NOTE — H&P ADULT - PROBLEM SELECTOR PLAN 9
F: none  E: replete as needed  N: renal diet  DVT ppx: heparin  Activity: ambulate with assistance  Dispo: [facility] Hx of colon cancer, now with colostomy.   - no active interventions Per pt, history of aortic valve replacement ~1-2 years ago.   Unclear about further cardiac history. Pt states he takes Eliquis and Plavix at home. States he took Plavix yesterday (3/25) and stopped plavix 3 days ago.  Upon more collatoral from pt's Cardiologist, Pt has history of MI 1-2 years ago Is on the Eliquis for paraoxysmal afib.   Cardiologist Dr. Adams,    - Plan as above Hx of COPD, on 2L O2 at home.   No s/s of exacerbation on this admission, satting 100% on home O2. Uses CPAP at nighttime.   - c/w home O2  - c/w CPAP at nighttime

## 2024-03-26 NOTE — H&P ADULT - PROBLEM SELECTOR PLAN 10
F: none  E: replete as needed  N: renal diet  DVT ppx: heparin  Activity: ambulate with assistance  Dispo: [facility] History of right lower extremity angioplasty in 1/2024.   Per collateral  from Pt's Vascular Surgeon, Pt had a balloon Angioplasty in Proximal AT abd below the knee popliteal   as started on Plavix since with 1 conventional and 1 drug eluting stent.   Home meds: Plavix 75 mg qd    - Hold for now. Will resume if Hgb stable on repeat cbc. Hx of colon cancer, now with colostomy.   - no active interventions

## 2024-03-26 NOTE — H&P ADULT - HISTORY OF PRESENT ILLNESS
HPI: Patient is a 69yo M wiht PMH of DM, diabetic wounds s/p left toe amputations, COPD (on home 2L, on BIPAP overnight), CKD (unknown baseline Cr), and colon cancer (w/ colostomy bag) who presents with right toe pain/wound for the past 6-7 weeks. Reportedly patient had outpatient XRays of the foot 3 days ago which were concerning for "bone infection". Has been on augmentin outpatient for the past 2 weeks without improvement.     In the ED:  Vital Signs: T 98.4 F, HR 87, /57, RR 17, SpO2 100% on 2L NC  Labs: significant for ESR 85, CRP 43.3, WBC 10.66, Hgb 7.8, HCO3 16, BUN/Cr 81/4.20, lactate 0.5  XR R foot: pending read; wet read c/f osteomyelitis  CT R foot: pending read  EKG: pending  Interventions: zosyn 3.375g IV x 1, not given vanc due to concern for allergy  Consults: podiatry   HPI: Patient is a 69yo M with PMH of DM, diabetic wounds s/p left toe amputations, COPD (on home 2L, on BIPAP overnight), CKD (unknown baseline Cr), ?CAD, PAD, and colon cancer (w/ colostomy bag) who presents with right toe pain/wound for the past 6-7 weeks. Reportedly patient had outpatient XR of the foot 3 days ago which were concerning for "bone infection". States he was initially seen by his vascular surgeon, who "ballooned the veins" in his leg to improve blood flow, no stents placed at the time. Has been on doxycycline 100mg bid outpatient for the past 2 weeks without improvement. A few days ago, he developed worsening pain on his right foot with erythema and blistering. Describes intense pain and inability to bear weight on the right foot which brought him in to the ED. Of note, patient reports an allergy to IV antibiotic that causes angioedema and some difficulty breathing, does not recall whether it was vancomycin or daptomycin. Reports he recently switched nephrologists and was started on sodium bicarbonate; per son at bedside, baseline Cr is 4.02. Makes urine. Additionally states he "needs stents " for his heart, but does not currently have any. Uses NC 2L at baseline, CPAP at night. No other complaints at this time.     In the ED:  Vital Signs: T 98.4 F, HR 87, /57, RR 17, SpO2 100% on 2L NC  Labs: significant for ESR 85, CRP 43.3, WBC 10.66, Hgb 7.8, HCO3 16, BUN/Cr 81/4.20, lactate 0.5  XR R foot: pending read; wet read c/f osteomyelitis  CT R foot: pending read  EKG: pending  Interventions: zosyn 3.375g IV x 1, not given vanc due to concern for allergy  Consults: podiatry   HPI: Patient is a 67yo M with PMH of DM, diabetic wounds s/p left toe amputations, COPD (on home 2L, on BIPAP overnight), CKD (baseline Cr 3.9), ?CAD, PAD, and colon cancer (w/ colostomy bag) who presents with right toe pain/wound for the past 6-7 weeks. Reportedly patient had outpatient XR of the foot 3 days ago which were concerning for "bone infection". States he was initially seen by his vascular surgeon, who "ballooned the veins" in his leg to improve blood flow, no stents placed at the time. Has been on doxycycline 100mg bid outpatient for the past 2 weeks without improvement. A few days ago, he developed worsening pain on his right foot with erythema and blistering. Describes intense pain and inability to bear weight on the right foot which brought him in to the ED. Of note, patient reports an allergy to IV antibiotic that causes angioedema and some difficulty breathing, does not recall whether it was vancomycin or daptomycin. Reports he recently switched nephrologists and was started on sodium bicarbonate; per son at bedside, baseline Cr is 4.02. Makes urine. Additionally states he "needs stents " for his heart, but does not currently have any. Uses NC 2L at baseline, CPAP at night. No other complaints at this time.     In the ED:  Vital Signs: T 98.4 F, HR 87, /57, RR 17, SpO2 100% on 2L NC  Labs: significant for ESR 85, CRP 43.3, WBC 10.66, Hgb 7.8, HCO3 16, BUN/Cr 81/4.20, lactate 0.5  XR R foot: pending read; wet read c/f osteomyelitis  CT R foot: pending read  EKG: pending  Interventions: zosyn 3.375g IV x 1, not given vanc due to concern for allergy  Consults: podiatry   HPI: Patient is a 69yo M with PMH of Chronic fib, DM, diabetic wounds s/p left toe amputations, COPD (on home 2L, on BIPAP overnight), Stage 5 CKD (baseline Cr 3.9), ?CAD, PAD, and colon cancer (w/ colostomy bag) who presents with right toe pain/wound for the past 6-7 weeks. Reportedly patient had outpatient XR of the foot 3 days ago which were concerning for "bone infection". States he was initially seen by his vascular surgeon, who "ballooned the veins" in his leg to improve blood flow, no stents placed at the time. Has been on doxycycline 100mg bid outpatient for the past 2 weeks without improvement. A few days ago, he developed worsening pain on his right foot with erythema and blistering. Describes intense pain and inability to bear weight on the right foot which brought him in to the ED. Of note, patient reports an allergy to IV antibiotic that causes angioedema and some difficulty breathing, does not recall whether it was vancomycin or daptomycin. Reports he recently switched nephrologists and was started on sodium bicarbonate; per son at bedside, baseline Cr is 4.02. Makes urine. Additionally states he "needs stents " for his heart, but does not currently have any. Uses NC 2L at baseline, CPAP at night. No other complaints at this time.     In the ED:  Vital Signs: T 98.4 F, HR 87, /57, RR 17, SpO2 100% on 2L NC  Labs: significant for ESR 85, CRP 43.3, WBC 10.66, Hgb 7.8, HCO3 16, BUN/Cr 81/4.20, lactate 0.5  XR R foot: pending read; wet read c/f osteomyelitis  CT R foot: pending read  EKG: pending  Interventions: zosyn 3.375g IV x 1, not given vanc due to concern for allergy  Consults: podiatry

## 2024-03-26 NOTE — H&P ADULT - NSHPPHYSICALEXAM_GEN_ALL_CORE
.  VITAL SIGNS:  T(C): 36.9 (03-26-24 @ 00:19), Max: 36.9 (03-26-24 @ 00:19)  T(F): 98.4 (03-26-24 @ 00:19), Max: 98.4 (03-26-24 @ 00:19)  HR: 87 (03-26-24 @ 00:19) (87 - 87)  BP: 130/57 (03-26-24 @ 00:19) (130/57 - 130/57)  BP(mean): --  RR: 17 (03-26-24 @ 00:19) (17 - 17)  SpO2: 100% (03-26-24 @ 00:19) (100% - 100%)  Wt(kg): --    PHYSICAL EXAM:    Constitutional: resting comfortably in bed; NAD  Head: NC/AT  Eyes: PERRL, EOMI, anicteric sclera  ENT: no nasal discharge; uvula midline, no oropharyngeal erythema or exudates; MMM  Neck: supple; no JVD or thyromegaly  Respiratory: CTA B/L; no W/R/R, no retractions  Cardiac: +S1/S2; RRR; no M/R/G; PMI non-displaced  Gastrointestinal: abdomen soft, NT/ND; no rebound or guarding; +BSx4  Back: spine midline, no bony tenderness or step-offs; no CVAT B/L  Extremities: WWP, no clubbing or cyanosis; no peripheral edema  Musculoskeletal: NROM x4; no joint swelling, tenderness or erythema  Vascular: 2+ radial, DP/PT pulses B/L  Dermatologic: skin warm, dry and intact; no rashes, wounds, or scars  Lymphatic: no submandibular or cervical LAD  Neurologic: AAOx3; CNII-XII grossly intact; no focal deficits  Psychiatric: affect and characteristics of appearance, verbalizations, behaviors are appropriate .  VITAL SIGNS:  T(C): 36.9 (03-26-24 @ 00:19), Max: 36.9 (03-26-24 @ 00:19)  T(F): 98.4 (03-26-24 @ 00:19), Max: 98.4 (03-26-24 @ 00:19)  HR: 87 (03-26-24 @ 00:19) (87 - 87)  BP: 130/57 (03-26-24 @ 00:19) (130/57 - 130/57)  BP(mean): --  RR: 17 (03-26-24 @ 00:19) (17 - 17)  SpO2: 100% (03-26-24 @ 00:19) (100% - 100%)  Wt(kg): --    PHYSICAL EXAM:    Constitutional: resting comfortably in bed; mild distress 2/2 foot pain  Head: NC/AT  Eyes: PERRL, EOMI, anicteric sclera  ENT: no nasal discharge; MMM  Neck: supple; no JVD or thyromegaly  Respiratory: CTA B/L; no W/R/R, no retractions; on 2L NC  Cardiac: +S1/S2; RRR; no M/R/G  Gastrointestinal: abdomen soft, NT/ND; no rebound or guarding; +BSx4  Extremities: WWP, no clubbing or cyanosis; no peripheral edema  Musculoskeletal: NROM x4; no joint swelling, tenderness or erythema  Vascular: 2+ radial, non-palpable DP/PT pulses B/L (pulses dopplerable)  Dermatologic: skin warm, dry and intact; no rashes; foot wounds covered in gauze  - per podiatry exam: erythema around Rlateral foot wound extending proximally to met base and dorsally to 5th metatarsal; R lateral 5th metatarsal head with 3 x 2 cm wound with overlying eschar, neg PTB; L distal hallux with 0.7 x 0.5 cm superficial wound with granular base. S/p 2-5 ray amputations on Left  Neurologic: AAOx3; CNII-XII grossly intact; no focal deficits  Psychiatric: affect and characteristics of appearance, verbalizations, behaviors are appropriate

## 2024-03-26 NOTE — H&P ADULT - PROBLEM SELECTOR PLAN 4
- f/u A1c  - monitor FSG  - mod ISS Patient with hx of diabetes, reportedly last A1c 5. Self-titrates home insulin doses based on blood sugar, but unable to name how much he takes on average.   - f/u A1c  - monitor FSG  - moderate ISS Patient with hx of diabetes, reportedly last A1c 5. Self-titrates home insulin doses based on blood sugar, but unable to name how much he takes on average.   - A1c 5 (3/26)    - monitor FSG  - moderate ISS Patient with hx of CKD, baseline Cr 3.9- 4 per son.   On presentation, BUN/Cr 81/4.20 with metabolic acidosis likely iso acute renal failure, HCO3 16.   S/P 500 cc LR bolus   Home med: sodium bicarb 650mg bid.    - Renal consulted  - f/u urine lytes  - avoid nephrotoxic agents  - monitor urine output  - continue sodium bicarb 650 bid Likely AOCD iso chronic kidney disease. No s/s of active bleeding. Per old lab review, Hgb was 9 in 1/2024.     - F/u iron studies  - maintain active T&S  - transfuse for hgb < 7

## 2024-03-26 NOTE — ED ADULT NURSE REASSESSMENT NOTE - NS ED NURSE REASSESS COMMENT FT1
Blood cultures ordered at 0320. ABX ordered at 0152.  ABX given prior to Blood Culture's being drawn.

## 2024-03-26 NOTE — H&P ADULT - PROBLEM SELECTOR PLAN 2
Patient with hx of CKD, unknown baseline Cr.  On presentation, BUN/Cr 81/4.20 with metabolic acidosis likely iso acute renal failure, HCO3 16.  - f/u urine lytes  - f/u bladder scan to r/o obstruction  - consider retroperitoneal ultasound  - avoid nephrotoxic agents  - consider renal consult if Cr uptrending on repeat labs  - obtain collateral on baseline levels Patient with hx of CKD, baseline Cr 4.02 per son.   On presentation, BUN/Cr 81/4.20 with metabolic acidosis likely iso acute renal failure, HCO3 16.  Home med: sodium bicarb 650mg bid.  - f/u urine lytes  - f/u bladder scan to r/o obstruction  - consider retroperitoneal ultrasound  - avoid nephrotoxic agents  - start LR 500cc bolus x 1  - monitor urine output  - consider renal consult if Cr uptrending on repeat labs  - obtain collateral on baseline levels  - continue sodium bicarb 650 bid Patient with hx of CKD, baseline Cr 3.9- 4 per son.   On presentation, BUN/Cr 81/4.20 with metabolic acidosis likely iso acute renal failure, HCO3 16.   S/P 500 cc LR bolus   Home med: sodium bicarb 650mg bid.    - Renal consulted  - f/u urine lytes  - avoid nephrotoxic agents  - monitor urine output  - continue sodium bicarb 650 bid

## 2024-03-26 NOTE — CONSULT NOTE ADULT - SUBJECTIVE AND OBJECTIVE BOX
Vascular Attending:  Dr. Raffi Boykin Complaint: PAD      HPI:  HPI: Patient is a 67yo M with PMH of Chronic fib, DM, diabetic wounds s/p left toe amputations, COPD (on home 2L, on BIPAP overnight), Stage 5 CKD (baseline Cr 3.9), ?CAD, PAD, and colon cancer (w/ colostomy bag) who presents with right toe pain/wound for the past 6-7 weeks. Reportedly patient had outpatient XR of the foot 3 days ago which were concerning for "bone infection". States he was initially seen by his vascular surgeon, who "ballooned the veins" in his leg to improve blood flow, no stents placed at the time. Has been on doxycycline 100mg bid outpatient for the past 2 weeks without improvement. A few days ago, he developed worsening pain on his right foot with erythema and blistering. Describes intense pain and inability to bear weight on the right foot which brought him in to the ED. Of note, patient reports an allergy to IV antibiotic that causes angioedema and some difficulty breathing, does not recall whether it was vancomycin or daptomycin. Reports he recently switched nephrologists and was started on sodium bicarbonate; per son at bedside, baseline Cr is 4.02. Makes urine. Additionally states he "needs stents " for his heart, but does not currently have any. Uses NC 2L at baseline, CPAP at night. No other complaints at this time.     In the ED:  Vital Signs: T 98.4 F, HR 87, /57, RR 17, SpO2 100% on 2L NC  Labs: significant for ESR 85, CRP 43.3, WBC 10.66, Hgb 7.8, HCO3 16, BUN/Cr 81/4.20, lactate 0.5  XR R foot: pending read; wet read c/f osteomyelitis  CT R foot: pending read  EKG: pending  Interventions: zosyn 3.375g IV x 1, not given vanc due to concern for allergy  Consults: podiatry   (26 Mar 2024 04:45)      VASCULAR SURGERY ADDENDUM  The patient is a 68 year old male with a PMHx of AFib, DM c/b diabetic foot wounds s/p left toe amputation, COPD (on 2L O2 at home, BiPAP overnight), CKD 5 (baseline Cr 3.9), CAD, colon ca s/p resection with end colostomy, and PAD w/ chronic wounds s/p balloon angioplasty of RLE and possible osteomyelitis s/p PO antibiotics (Augmentin x 2w) now with worsening right foot pain admitted for failed outpatient management of osteomyelitis.   Vascular consulted for continued management of PAD in setting of RLE wounds.   Patient states foot pain started to increase approx 4 days ago with increasing erythema.         PAST MEDICAL & SURGICAL HISTORY:  COPD with hypoxia  Chronic kidney disease (CKD)  CAD (coronary artery disease)  Type 2 diabetes mellitus  Diabetic foot ulcers  PAD (peripheral artery disease)  Status post amputation of toe      MEDICATIONS  (STANDING):  acetaminophen     Tablet .. 975 milliGRAM(s) Oral every 8 hours  apixaban 5 milliGRAM(s) Oral two times a day  atorvastatin 80 milliGRAM(s) Oral at bedtime  clopidogrel Tablet 75 milliGRAM(s) Oral daily  dextrose 5%. 1000 milliLiter(s) (50 mL/Hr) IV Continuous <Continuous>  dextrose 5%. 1000 milliLiter(s) (100 mL/Hr) IV Continuous <Continuous>  dextrose 50% Injectable 12.5 Gram(s) IV Push once  dextrose 50% Injectable 25 Gram(s) IV Push once  dextrose 50% Injectable 25 Gram(s) IV Push once  glucagon  Injectable 1 milliGRAM(s) IntraMuscular once  influenza  Vaccine (HIGH DOSE) 0.7 milliLiter(s) IntraMuscular once  insulin lispro (ADMELOG) corrective regimen sliding scale   SubCutaneous at bedtime  insulin lispro (ADMELOG) corrective regimen sliding scale   SubCutaneous three times a day before meals  linezolid    Tablet 600 milliGRAM(s) Oral every 12 hours  metoprolol succinate ER 25 milliGRAM(s) Oral daily  piperacillin/tazobactam IVPB.. 3.375 Gram(s) IV Intermittent every 12 hours  sodium bicarbonate 650 milliGRAM(s) Oral every 12 hours    MEDICATIONS  (PRN):  dextrose Oral Gel 15 Gram(s) Oral once PRN Blood Glucose LESS THAN 70 milliGRAM(s)/deciliter      Allergies    vancomycin (Unknown)    Intolerances        SOCIAL HISTORY:    FAMILY HISTORY:  FH: diabetes mellitus        Vital Signs Last 24 Hrs  T(C): 36.3 (26 Mar 2024 09:30), Max: 37.1 (26 Mar 2024 05:28)  T(F): 97.4 (26 Mar 2024 09:30), Max: 98.7 (26 Mar 2024 05:28)  HR: 83 (26 Mar 2024 09:30) (74 - 87)  BP: 129/57 (26 Mar 2024 09:30) (129/57 - 167/75)  BP(mean): 105 (26 Mar 2024 05:28) (105 - 105)  RR: 17 (26 Mar 2024 09:30) (17 - 18)  SpO2: 100% (26 Mar 2024 09:30) (100% - 100%)    Parameters below as of 26 Mar 2024 09:30  Patient On (Oxygen Delivery Method): nasal cannula  O2 Flow (L/min): 2      PHYSICAL EXAM:   General: Patient is doing well and lying in bed comfortably  Constitutional: awake and alert  Pulm: Nonlabored breathing, no respiratory distress  CV: Regular rate and rhythm, normal sinus rhythm  Abd:  soft, nontender, distended, multiple nodules in the lower quadrants. ostomy bag with air and stool in the RLQ. No rebound, no guarding.   Extremities: RLE: triphasic fem, triphasic pop, DP/PT biphasic. right 5th lateral metatarsal head 3x3 wound with overlaying eschar dorsum of foot mild pitting edema. no tenderness to palpation     LABS:                        7.5    10.46 )-----------( 201      ( 26 Mar 2024 05:30 )             24.0     03-26    132<L>  |  102  |  77<H>  ----------------------------<  133<H>  3.4<L>   |  14<L>  |  3.97<H>    Ca    8.6      26 Mar 2024 05:30  Phos  5.9     03-26  Mg     1.9     03-26    TPro  6.5  /  Alb  3.5  /  TBili  0.2  /  DBili  x   /  AST  18  /  ALT  13  /  AlkPhos  78  03-26      Urinalysis Basic - ( 26 Mar 2024 05:30 )    Color: x / Appearance: x / SG: x / pH: x  Gluc: 133 mg/dL / Ketone: x  / Bili: x / Urobili: x   Blood: x / Protein: x / Nitrite: x   Leuk Esterase: x / RBC: x / WBC x   Sq Epi: x / Non Sq Epi: x / Bacteria: x        RADIOLOGY & ADDITIONAL STUDIES:        A&P    The patient is a 68 year old male with a PMHx of AFib, DM c/b diabetic foot wounds s/p left toe amputation, COPD (on 2L O2 at home, BiPAP overnight), CKD 5 (baseline Cr 3.9), CAD, colon ca s/p resection with end colostomy and PAD w/ chronic wounds s/p balloon angioplasty of RLE and possible osteomyelitis s/p PO antibiotics (Augmentin x 2w) now with worsening right foot pain admitted for failed outpatient management of osteomyelitis.   Vascular consulted for continued management of PAD in setting of RLE wounds.     Recommendations  No acute vascular intervention at this time  F/U arterial and venous duplex  pending discussion   [phone]    Vascular Attending:  Dr. Buddy Boykin Complaint: PAD      HPI:  HPI: Patient is a 69yo M with PMH of Chronic fib, DM, diabetic wounds s/p left toe amputations, COPD (on home 2L, on BIPAP overnight), Stage 5 CKD (baseline Cr 3.9), ?CAD, PAD, and colon cancer (w/ colostomy bag) who presents with right toe pain/wound for the past 6-7 weeks. Reportedly patient had outpatient XR of the foot 3 days ago which were concerning for "bone infection". States he was initially seen by his vascular surgeon, who "ballooned the veins" in his leg to improve blood flow, no stents placed at the time. Has been on doxycycline 100mg bid outpatient for the past 2 weeks without improvement. A few days ago, he developed worsening pain on his right foot with erythema and blistering. Describes intense pain and inability to bear weight on the right foot which brought him in to the ED. Of note, patient reports an allergy to IV antibiotic that causes angioedema and some difficulty breathing, does not recall whether it was vancomycin or daptomycin. Reports he recently switched nephrologists and was started on sodium bicarbonate; per son at bedside, baseline Cr is 4.02. Makes urine. Additionally states he "needs stents " for his heart, but does not currently have any. Uses NC 2L at baseline, CPAP at night. No other complaints at this time.     In the ED:  Vital Signs: T 98.4 F, HR 87, /57, RR 17, SpO2 100% on 2L NC  Labs: significant for ESR 85, CRP 43.3, WBC 10.66, Hgb 7.8, HCO3 16, BUN/Cr 81/4.20, lactate 0.5  XR R foot: pending read; wet read c/f osteomyelitis  CT R foot: pending read  EKG: pending  Interventions: zosyn 3.375g IV x 1, not given vanc due to concern for allergy  Consults: podiatry   (26 Mar 2024 04:45)      VASCULAR SURGERY ADDENDUM  The patient is a 68 year old male with a PMHx of AFib, DM c/b diabetic foot wounds s/p left toe amputation, COPD (on 2L O2 at home, BiPAP overnight), CKD 5 (baseline Cr 3.9), CAD, colon ca s/p resection with end colostomy, and PAD w/ chronic wounds s/p balloon angioplasty of RLE and possible osteomyelitis s/p PO antibiotics (Augmentin x 2w) now with worsening right foot pain admitted for failed outpatient management of osteomyelitis.   Vascular consulted for continued management of PAD in setting of RLE wounds.   Patient states foot pain started to increase approx 4 days ago with increasing erythema.         PAST MEDICAL & SURGICAL HISTORY:  COPD with hypoxia  Chronic kidney disease (CKD)  CAD (coronary artery disease)  Type 2 diabetes mellitus  Diabetic foot ulcers  PAD (peripheral artery disease)  Status post amputation of toe      MEDICATIONS  (STANDING):  acetaminophen     Tablet .. 975 milliGRAM(s) Oral every 8 hours  apixaban 5 milliGRAM(s) Oral two times a day  atorvastatin 80 milliGRAM(s) Oral at bedtime  clopidogrel Tablet 75 milliGRAM(s) Oral daily  dextrose 5%. 1000 milliLiter(s) (50 mL/Hr) IV Continuous <Continuous>  dextrose 5%. 1000 milliLiter(s) (100 mL/Hr) IV Continuous <Continuous>  dextrose 50% Injectable 12.5 Gram(s) IV Push once  dextrose 50% Injectable 25 Gram(s) IV Push once  dextrose 50% Injectable 25 Gram(s) IV Push once  glucagon  Injectable 1 milliGRAM(s) IntraMuscular once  influenza  Vaccine (HIGH DOSE) 0.7 milliLiter(s) IntraMuscular once  insulin lispro (ADMELOG) corrective regimen sliding scale   SubCutaneous at bedtime  insulin lispro (ADMELOG) corrective regimen sliding scale   SubCutaneous three times a day before meals  linezolid    Tablet 600 milliGRAM(s) Oral every 12 hours  metoprolol succinate ER 25 milliGRAM(s) Oral daily  piperacillin/tazobactam IVPB.. 3.375 Gram(s) IV Intermittent every 12 hours  sodium bicarbonate 650 milliGRAM(s) Oral every 12 hours    MEDICATIONS  (PRN):  dextrose Oral Gel 15 Gram(s) Oral once PRN Blood Glucose LESS THAN 70 milliGRAM(s)/deciliter      Allergies    vancomycin (Unknown)    Intolerances        SOCIAL HISTORY:    FAMILY HISTORY:  FH: diabetes mellitus        Vital Signs Last 24 Hrs  T(C): 36.3 (26 Mar 2024 09:30), Max: 37.1 (26 Mar 2024 05:28)  T(F): 97.4 (26 Mar 2024 09:30), Max: 98.7 (26 Mar 2024 05:28)  HR: 83 (26 Mar 2024 09:30) (74 - 87)  BP: 129/57 (26 Mar 2024 09:30) (129/57 - 167/75)  BP(mean): 105 (26 Mar 2024 05:28) (105 - 105)  RR: 17 (26 Mar 2024 09:30) (17 - 18)  SpO2: 100% (26 Mar 2024 09:30) (100% - 100%)    Parameters below as of 26 Mar 2024 09:30  Patient On (Oxygen Delivery Method): nasal cannula  O2 Flow (L/min): 2      PHYSICAL EXAM:   General: Patient is doing well and lying in bed comfortably  Constitutional: awake and alert  Pulm: Nonlabored breathing, no respiratory distress  CV: Regular rate and rhythm, normal sinus rhythm  Abd:  soft, nontender, distended, multiple nodules in the lower quadrants. ostomy bag with air and stool in the RLQ. No rebound, no guarding.   Extremities: RLE: triphasic fem, triphasic pop, DP/PT biphasic. right 5th lateral metatarsal head 3x3 wound with overlaying eschar dorsum of foot mild pitting edema. no tenderness to palpation     LABS:                        7.5    10.46 )-----------( 201      ( 26 Mar 2024 05:30 )             24.0     03-26    132<L>  |  102  |  77<H>  ----------------------------<  133<H>  3.4<L>   |  14<L>  |  3.97<H>    Ca    8.6      26 Mar 2024 05:30  Phos  5.9     03-26  Mg     1.9     03-26    TPro  6.5  /  Alb  3.5  /  TBili  0.2  /  DBili  x   /  AST  18  /  ALT  13  /  AlkPhos  78  03-26      Urinalysis Basic - ( 26 Mar 2024 05:30 )    Color: x / Appearance: x / SG: x / pH: x  Gluc: 133 mg/dL / Ketone: x  / Bili: x / Urobili: x   Blood: x / Protein: x / Nitrite: x   Leuk Esterase: x / RBC: x / WBC x   Sq Epi: x / Non Sq Epi: x / Bacteria: x        RADIOLOGY & ADDITIONAL STUDIES:        A&P    The patient is a 68 year old male with a PMHx of AFib, DM c/b diabetic foot wounds s/p left toe amputation, COPD (on 2L O2 at home, BiPAP overnight), CKD 5 (baseline Cr 3.9), CAD, colon ca s/p resection with end colostomy and PAD w/ chronic wounds s/p balloon angioplasty of RLE and possible osteomyelitis s/p PO antibiotics (Augmentin x 2w) now with worsening right foot pain admitted for failed outpatient management of osteomyelitis.   Vascular consulted for continued management of PAD in setting of RLE wounds.     Recommendations  No acute vascular intervention at this time  F/U arterial and venous duplex  discussed with chief on call   [id number]

## 2024-03-26 NOTE — H&P ADULT - PROBLEM SELECTOR PLAN 8
Hx of colon cancer, now with colostomy.   - no active interventions Hx of COPD, on 2L O2 at home.   No s/s of exacerbation on this admission, satting 100% on home O2. Uses CPAP at nighttime.   - c/w home O2  - c/w CPAP at nighttime Pt with h/o of paroxysmal AFib per Cardiologist. Currently rate controlled.   Home meds: Eliquis 5 bid, coreg 6.25 mg bid     -Pt's Hgb slowly downtrending. Will get repeat PM cbc and if stable restart home meds History of right lower extremity angioplasty in 1/2024.   Per collateral  from Pt's Vascular Surgeon, Pt had a balloon Angioplasty in Proximal AT abd below the knee popliteal   as started on Plavix since with 1 conventional and 1 drug eluting stent.   Home meds: Plavix 75 mg qd    - Hold for now. Will resume if Hgb stable on repeat cbc. History of right lower extremity angioplasty in 1/2024.   Per collateral  from Pt's Vascular Surgeon, Pt had a balloon Angioplasty in Proximal AT abd below the knee popliteal   as started on Plavix since with 1 conventional and 1 drug eluting stent.   Home meds: Plavix 75 mg qd    - c/w home meds

## 2024-03-26 NOTE — ED ADULT TRIAGE NOTE - CHIEF COMPLAINT QUOTE
Pt presents via EMS form home, with c/o "rt toe pain/wound" x approx 6-7 weeks. Per EMS, XRAYS done today show "bone infection". Hx DM with previous amputation to left toes. Arrives on O2 @ 2l/min as per home use, hx of COPD. Also hx of CKD and colon CA (has colostomy).

## 2024-03-26 NOTE — CONSULT NOTE ADULT - SUBJECTIVE AND OBJECTIVE BOX
Attending: Dr. Lindsey     Patient is a 68y old  Male who presents with a chief complaint of     HPI: 67 y/o M pmh PVD, DM, COPD presents for right foot wound. States wound started about 6 weeks ago. Saw his vascular doctor at [facility] who took x-rays which were concerning for osteomyelitis. Pt was given Augmentin 2 weeks ago, which he has been taking. Has been changing dressing with betadine daily. Pt developed redness and worsening pain to foot ~ 4 days ago so presented to eD.       Review of systems negative except per HPI and as stated below  General:	 no weakness; no fevers, no chills  Skin/Breast: no rash  Respiratory and Thorax: no SOB, no cough  Cardiovascular:	No chest pain  Gastrointestinal:	 no nausea, vomiting , diarrhea  Genitourinary:	no dysuria, no difficulty urinating, no hematuria  Musculoskeletal:	no weakness, no joint swelling/pain  Neurological:	no focal weakness/numbness  Endocrine:	no polyuria, no polydipsia    PAST MEDICAL & SURGICAL HISTORY:    Home Medications:    Allergies    vancomycin (Unknown)    Intolerances      FAMILY HISTORY:    Social History:       LABS                        7.8    10.66 )-----------( 197      ( 26 Mar 2024 00:45 )             24.6     03-26    135  |  106  |  81<H>  ----------------------------<  107<H>  3.7   |  16<L>  |  4.20<H>    Ca    8.6      26 Mar 2024 00:45    TPro  6.5  /  Alb  3.5  /  TBili  0.2  /  DBili  x   /  AST  18  /  ALT  13  /  AlkPhos  78  03-26      ESR: 85  CRP: --  03-26 @ 00:45    Vital Signs Last 24 Hrs  T(C): 36.9 (26 Mar 2024 00:19), Max: 36.9 (26 Mar 2024 00:19)  T(F): 98.4 (26 Mar 2024 00:19), Max: 98.4 (26 Mar 2024 00:19)  HR: 87 (26 Mar 2024 00:19) (87 - 87)  BP: 130/57 (26 Mar 2024 00:19) (130/57 - 130/57)  BP(mean): --  RR: 17 (26 Mar 2024 00:19) (17 - 17)  SpO2: 100% (26 Mar 2024 00:19) (100% - 100%)    Parameters below as of 26 Mar 2024 00:19  Patient On (Oxygen Delivery Method): nasal cannula  O2 Flow (L/min): 2      PHYSICAL EXAM  General: NAD, AA0x3    Lower Extremity Focused:  Vasc: PT/DP non-palpable b/l - biphasic waveforms heard at b/l DP and monophasic waveforms heard at b/l PT; Erythema around right lateral foot wound extending proximally to met base and dorsally to 5th metatarsal  Derm: Right lateral 5th metatrasl head with 3 x 2 cm wound with overlying eschar, (-)PTB, (+)malodor, (-)fluctuance, (-)tracking, (-)tunneling  Left distal hallux with hyperkeratotic lesion debrided to reveal 0.7 x 0.5 cm superficial wound with granular base, does not track deep  Neuro: protective sensation in tact  MSK: s/p 2-5 ray amputations on Left    RADIOLOGY  X-rays Left foot wet read: cortical erosions of 5th metatarsal head concerning for osteomyelitis, no signs of gas  CT L foot wet read: No tracking soft tissue gas                     Attending: Dr. Lindsey     Patient is a 68y old  Male who presents with a chief complaint of     HPI: 67 y/o M pmh PVD, DM, COPD presents for right foot wound. States wound started about 6 weeks ago. Saw his vascular doctor at [facility] who took x-rays which were concerning for osteomyelitis. Pt was given Augmentin 2 weeks ago, which he has been taking. Denies any recent drainage from the wound. Has been changing dressing with betadine daily. Pt developed redness and worsening pain to foot ~ 4 days ago so presented to ED.     Review of systems negative except per HPI and as stated below  General:	 no weakness; no fevers, no chills  Skin/Breast: no rash  Respiratory and Thorax: no SOB, no cough  Cardiovascular:	No chest pain  Gastrointestinal:	 no nausea, vomiting , diarrhea  Genitourinary:	no dysuria, no difficulty urinating, no hematuria  Musculoskeletal:	no weakness, no joint swelling/pain  Neurological:	no focal weakness/numbness  Endocrine:	no polyuria, no polydipsia    PAST MEDICAL & SURGICAL HISTORY:    Home Medications:    Allergies    vancomycin (Unknown)    Intolerances      FAMILY HISTORY:    Social History:       LABS                        7.8    10.66 )-----------( 197      ( 26 Mar 2024 00:45 )             24.6     03-26    135  |  106  |  81<H>  ----------------------------<  107<H>  3.7   |  16<L>  |  4.20<H>    Ca    8.6      26 Mar 2024 00:45    TPro  6.5  /  Alb  3.5  /  TBili  0.2  /  DBili  x   /  AST  18  /  ALT  13  /  AlkPhos  78  03-26      ESR: 85  CRP: --  03-26 @ 00:45    Vital Signs Last 24 Hrs  T(C): 36.9 (26 Mar 2024 00:19), Max: 36.9 (26 Mar 2024 00:19)  T(F): 98.4 (26 Mar 2024 00:19), Max: 98.4 (26 Mar 2024 00:19)  HR: 87 (26 Mar 2024 00:19) (87 - 87)  BP: 130/57 (26 Mar 2024 00:19) (130/57 - 130/57)  BP(mean): --  RR: 17 (26 Mar 2024 00:19) (17 - 17)  SpO2: 100% (26 Mar 2024 00:19) (100% - 100%)    Parameters below as of 26 Mar 2024 00:19  Patient On (Oxygen Delivery Method): nasal cannula  O2 Flow (L/min): 2      PHYSICAL EXAM  General: NAD, AA0x3    Lower Extremity Focused:  Vasc: PT/DP non-palpable b/l - biphasic waveforms heard at b/l DP and monophasic waveforms heard at b/l PT; Erythema around right lateral foot wound extending proximally to met base and dorsally to 5th metatarsal  Derm: Right lateral 5th metatrasl head with 3 x 2 cm wound with overlying eschar, (-)PTB, (+)malodor, (-)fluctuance, (-)tracking, (-)tunneling  Left distal hallux with hyperkeratotic lesion debrided to reveal 0.7 x 0.5 cm superficial wound with granular base, does not track deep  Neuro: protective sensation in tact  MSK: s/p 2-5 ray amputations on Left    RADIOLOGY  X-rays Left foot wet read: cortical erosions of 5th metatarsal head concerning for osteomyelitis, no signs of gas  CT L foot wet read: No tracking soft tissue gas

## 2024-03-26 NOTE — H&P ADULT - ATTENDING COMMENTS
Patient was seen and examined at bedside on 3/26/2024 at 12 pm with daughter present. Patient feels that pain in his R foot persists. Denies chest pain, SOB, N/V, abdominal pain. ROS is otherwise negative. Vitals, labwork and pertinent imaging reviewed. Exam - NAD, AAO x 4, PERRLA, EOMI, MMM, supple neck, chest - CTA b/l, CV - rrr, s1s2, no m/r/g, abd - soft, NTND, + BS, ext - wwp, RLE w/ , psych - normal affect    Plan:  -C/w Zosyn, add Linezolid  -MRI showing OM w/ phlegmon - will d/w Podiatry for possible intervention/biopsy  -Check b/l venous and arterial US  -Will need ID consult  -Renal consulted

## 2024-03-26 NOTE — H&P ADULT - PROBLEM SELECTOR PROBLEM 6
History of colon cancer Peripheral arterial disease H/O aortic valve replacement Type 2 diabetes mellitus Chronic atrial fibrillation

## 2024-03-26 NOTE — H&P ADULT - PROBLEM SELECTOR PROBLEM 8
History of colon cancer COPD without exacerbation Paroxysmal atrial fibrillation Peripheral arterial disease

## 2024-03-27 LAB
-  STAPHYLOCOCCUS EPIDERMIDIS, METHICILLIN RESISTANT: SIGNIFICANT CHANGE UP
ALBUMIN SERPL ELPH-MCNC: 3.1 G/DL — LOW (ref 3.3–5)
ALP SERPL-CCNC: 64 U/L — SIGNIFICANT CHANGE UP (ref 40–120)
ALT FLD-CCNC: 12 U/L — SIGNIFICANT CHANGE UP (ref 10–45)
ANION GAP SERPL CALC-SCNC: 12 MMOL/L — SIGNIFICANT CHANGE UP (ref 5–17)
AST SERPL-CCNC: 16 U/L — SIGNIFICANT CHANGE UP (ref 10–40)
BASOPHILS # BLD AUTO: 0.03 K/UL — SIGNIFICANT CHANGE UP (ref 0–0.2)
BASOPHILS NFR BLD AUTO: 0.3 % — SIGNIFICANT CHANGE UP (ref 0–2)
BILIRUB SERPL-MCNC: 0.2 MG/DL — SIGNIFICANT CHANGE UP (ref 0.2–1.2)
BLD GP AB SCN SERPL QL: NEGATIVE — SIGNIFICANT CHANGE UP
BUN SERPL-MCNC: 79 MG/DL — HIGH (ref 7–23)
CALCIUM SERPL-MCNC: 8.9 MG/DL — SIGNIFICANT CHANGE UP (ref 8.4–10.5)
CHLORIDE SERPL-SCNC: 108 MMOL/L — SIGNIFICANT CHANGE UP (ref 96–108)
CO2 SERPL-SCNC: 16 MMOL/L — LOW (ref 22–31)
CREAT SERPL-MCNC: 3.95 MG/DL — HIGH (ref 0.5–1.3)
EGFR: 16 ML/MIN/1.73M2 — LOW
EOSINOPHIL # BLD AUTO: 0.15 K/UL — SIGNIFICANT CHANGE UP (ref 0–0.5)
EOSINOPHIL NFR BLD AUTO: 1.7 % — SIGNIFICANT CHANGE UP (ref 0–6)
FERRITIN SERPL-MCNC: 134 NG/ML — SIGNIFICANT CHANGE UP (ref 30–400)
GLUCOSE BLDC GLUCOMTR-MCNC: 114 MG/DL — HIGH (ref 70–99)
GLUCOSE BLDC GLUCOMTR-MCNC: 130 MG/DL — HIGH (ref 70–99)
GLUCOSE BLDC GLUCOMTR-MCNC: 148 MG/DL — HIGH (ref 70–99)
GLUCOSE BLDC GLUCOMTR-MCNC: 194 MG/DL — HIGH (ref 70–99)
GLUCOSE SERPL-MCNC: 169 MG/DL — HIGH (ref 70–99)
GRAM STN FLD: ABNORMAL
HCT VFR BLD CALC: 23.8 % — LOW (ref 39–50)
HGB BLD-MCNC: 7.6 G/DL — LOW (ref 13–17)
IMM GRANULOCYTES NFR BLD AUTO: 0.3 % — SIGNIFICANT CHANGE UP (ref 0–0.9)
IRON SATN MFR SERPL: 19 % — SIGNIFICANT CHANGE UP (ref 16–55)
IRON SATN MFR SERPL: 27 UG/DL — LOW (ref 45–165)
LYMPHOCYTES # BLD AUTO: 2.41 K/UL — SIGNIFICANT CHANGE UP (ref 1–3.3)
LYMPHOCYTES # BLD AUTO: 27.2 % — SIGNIFICANT CHANGE UP (ref 13–44)
MCHC RBC-ENTMCNC: 28.1 PG — SIGNIFICANT CHANGE UP (ref 27–34)
MCHC RBC-ENTMCNC: 31.9 GM/DL — LOW (ref 32–36)
MCV RBC AUTO: 88.1 FL — SIGNIFICANT CHANGE UP (ref 80–100)
METHOD TYPE: SIGNIFICANT CHANGE UP
MONOCYTES # BLD AUTO: 0.9 K/UL — SIGNIFICANT CHANGE UP (ref 0–0.9)
MONOCYTES NFR BLD AUTO: 10.2 % — SIGNIFICANT CHANGE UP (ref 2–14)
NEUTROPHILS # BLD AUTO: 5.33 K/UL — SIGNIFICANT CHANGE UP (ref 1.8–7.4)
NEUTROPHILS NFR BLD AUTO: 60.3 % — SIGNIFICANT CHANGE UP (ref 43–77)
NRBC # BLD: 0 /100 WBCS — SIGNIFICANT CHANGE UP (ref 0–0)
PLATELET # BLD AUTO: 217 K/UL — SIGNIFICANT CHANGE UP (ref 150–400)
POTASSIUM SERPL-MCNC: 3.5 MMOL/L — SIGNIFICANT CHANGE UP (ref 3.5–5.3)
POTASSIUM SERPL-SCNC: 3.5 MMOL/L — SIGNIFICANT CHANGE UP (ref 3.5–5.3)
PROT SERPL-MCNC: 6.2 G/DL — SIGNIFICANT CHANGE UP (ref 6–8.3)
RBC # BLD: 2.7 M/UL — LOW (ref 4.2–5.8)
RBC # FLD: 14.3 % — SIGNIFICANT CHANGE UP (ref 10.3–14.5)
RH IG SCN BLD-IMP: POSITIVE — SIGNIFICANT CHANGE UP
SODIUM SERPL-SCNC: 136 MMOL/L — SIGNIFICANT CHANGE UP (ref 135–145)
TIBC SERPL-MCNC: 144 UG/DL — LOW (ref 220–430)
TRANSFERRIN SERPL-MCNC: 117 MG/DL — LOW (ref 200–360)
UIBC SERPL-MCNC: 117 UG/DL — SIGNIFICANT CHANGE UP (ref 110–370)
WBC # BLD: 8.85 K/UL — SIGNIFICANT CHANGE UP (ref 3.8–10.5)
WBC # FLD AUTO: 8.85 K/UL — SIGNIFICANT CHANGE UP (ref 3.8–10.5)

## 2024-03-27 PROCEDURE — 99223 1ST HOSP IP/OBS HIGH 75: CPT

## 2024-03-27 PROCEDURE — 99222 1ST HOSP IP/OBS MODERATE 55: CPT

## 2024-03-27 PROCEDURE — 99233 SBSQ HOSP IP/OBS HIGH 50: CPT | Mod: GC

## 2024-03-27 RX ORDER — LINEZOLID 600 MG/300ML
1 INJECTION, SOLUTION INTRAVENOUS
Qty: 28 | Refills: 0
Start: 2024-03-27 | End: 2024-04-09

## 2024-03-27 RX ORDER — ACETAMINOPHEN 500 MG
1000 TABLET ORAL ONCE
Refills: 0 | Status: COMPLETED | OUTPATIENT
Start: 2024-03-27 | End: 2024-03-27

## 2024-03-27 RX ADMIN — ATORVASTATIN CALCIUM 80 MILLIGRAM(S): 80 TABLET, FILM COATED ORAL at 22:07

## 2024-03-27 RX ADMIN — Medication 650 MILLIGRAM(S): at 09:27

## 2024-03-27 RX ADMIN — APIXABAN 5 MILLIGRAM(S): 2.5 TABLET, FILM COATED ORAL at 06:39

## 2024-03-27 RX ADMIN — LINEZOLID 600 MILLIGRAM(S): 600 INJECTION, SOLUTION INTRAVENOUS at 19:02

## 2024-03-27 RX ADMIN — Medication 650 MILLIGRAM(S): at 22:07

## 2024-03-27 RX ADMIN — Medication 400 MILLIGRAM(S): at 10:55

## 2024-03-27 RX ADMIN — APIXABAN 5 MILLIGRAM(S): 2.5 TABLET, FILM COATED ORAL at 19:02

## 2024-03-27 RX ADMIN — LINEZOLID 600 MILLIGRAM(S): 600 INJECTION, SOLUTION INTRAVENOUS at 06:39

## 2024-03-27 RX ADMIN — CLOPIDOGREL BISULFATE 75 MILLIGRAM(S): 75 TABLET, FILM COATED ORAL at 12:38

## 2024-03-27 RX ADMIN — PIPERACILLIN AND TAZOBACTAM 25 GRAM(S): 4; .5 INJECTION, POWDER, LYOPHILIZED, FOR SOLUTION INTRAVENOUS at 23:46

## 2024-03-27 RX ADMIN — Medication 975 MILLIGRAM(S): at 00:00

## 2024-03-27 RX ADMIN — PIPERACILLIN AND TAZOBACTAM 25 GRAM(S): 4; .5 INJECTION, POWDER, LYOPHILIZED, FOR SOLUTION INTRAVENOUS at 12:39

## 2024-03-27 RX ADMIN — Medication 2: at 14:14

## 2024-03-27 RX ADMIN — Medication 1000 MILLIGRAM(S): at 11:25

## 2024-03-27 NOTE — CONSULT NOTE ADULT - ASSESSMENT
67yo M w obesity, DM, diabetic wounds s/p left toe amputations, COPD (on home 2L, on BIPAP overnight), Stage 5 CKD (baseline Cr 3.9), CAD, PAD, and colon cancer (w/ colostomy) p/w right toe pain/wound for the past 6-7 weeks. Took 2 weeks of Doxy as outpt and then had an x-ray c/f "bone infxn." Primary team started pt on LZD and Piptazo. H/o Vanco/Dapto allergy, unclear which but reaction is angioedema so unwilling to try either.     OM  - D/w podiatry reg. deep tissue cx +/- bone biopsy  - Check ESR, CRP  - Agree with Linezolid 600 mg q12h and Piptazo 3.375gm q8h    Positive BCx  - 3/26 BCx+ve 1/4 bottles, PCR id'ed as MRSE; f/u id/s, likely contaminant  - Please repeat BCx today    Dr. Archer will assume care tomorrow.

## 2024-03-27 NOTE — PROGRESS NOTE ADULT - ASSESSMENT
67 y/o M pmh PVD, DM, COPD presents for right foot wound and cellulitis. At time of consult, VSS, WBC 10.66, CRP 43.3, ESR 85. R foot x-ray wet read with cortical erosions of 5th metatarsal head concerning for osteomyelitis. High suspicion for Osteomyelitis of Right foot.     Plan:   - C/w IV abx  - f/u MRI of R foot to evaluate extent of osteomyelitis  - Local wound care: wounds cleansed with normal saline. Betadine DSD applied to Right foot wound and WTD dressing applied to R foot wound.   - Pt should heel WBAT to RLE  - Rest of care per primary team    Plan d/w Attending. Podiatry following.

## 2024-03-27 NOTE — PROGRESS NOTE ADULT - SUBJECTIVE AND OBJECTIVE BOX
OVERNIGHT EVENTS: Vascular rec no [id number], duplex shows no DVT.     SUBJECTIVE / INTERVAL HPI: Patient seen and examined at bedside.     VITAL SIGNS:  Vital Signs Last 24 Hrs  T(C): 36.4 (27 Mar 2024 06:01), Max: 36.5 (26 Mar 2024 21:50)  T(F): 97.5 (27 Mar 2024 06:01), Max: 97.7 (26 Mar 2024 21:50)  HR: 80 (27 Mar 2024 06:30) (69 - 84)  BP: 123/53 (27 Mar 2024 06:01) (117/51 - 123/53)  BP(mean): 73 (26 Mar 2024 21:50) (73 - 73)  RR: 20 (27 Mar 2024 06:30) (18 - 24)  SpO2: 100% (27 Mar 2024 06:30) (100% - 100%)    Parameters below as of 27 Mar 2024 06:30  Patient On (Oxygen Delivery Method): HOME CPAP      PHYSICAL EXAM:    Constitutional: resting comfortably in bed; mild distress 2/2 foot pain  Head: NC/AT  Eyes: PERRL, EOMI, anicteric sclera  ENT: no nasal discharge; MMM  Neck: supple; no JVD or thyromegaly  Respiratory: CTA B/L; no W/R/R, no retractions; on 2L NC  Cardiac: +S1/S2; RRR; no M/R/G  Gastrointestinal: abdomen soft, +ostomy bag draining billious stool    Extremities: WWP, no clubbing or cyanosis; no peripheral edema.   -per podiatry exam: erythema around Rlateral foot wound extending proximally to met base and dorsally to 5th metatarsal; R lateral 5th metatarsal head with 3 x 2 cm wound with overlying eschar, neg PTB; L distal hallux with 0.7 x 0.5 cm superficial wound with granular base. S/p 2-5 ray amputations on Left  Vascular: 2+ radial, non-palpable DP/PT pulses B/L (pulses dopplerable)  Neurologic: AAOx3;    MEDICATIONS:  MEDICATIONS  (STANDING):  acetaminophen     Tablet .. 975 milliGRAM(s) Oral every 8 hours  apixaban 5 milliGRAM(s) Oral two times a day  atorvastatin 80 milliGRAM(s) Oral at bedtime  clopidogrel Tablet 75 milliGRAM(s) Oral daily  dextrose 5%. 1000 milliLiter(s) (50 mL/Hr) IV Continuous <Continuous>  dextrose 5%. 1000 milliLiter(s) (100 mL/Hr) IV Continuous <Continuous>  dextrose 50% Injectable 25 Gram(s) IV Push once  dextrose 50% Injectable 25 Gram(s) IV Push once  dextrose 50% Injectable 12.5 Gram(s) IV Push once  glucagon  Injectable 1 milliGRAM(s) IntraMuscular once  influenza  Vaccine (HIGH DOSE) 0.7 milliLiter(s) IntraMuscular once  insulin lispro (ADMELOG) corrective regimen sliding scale   SubCutaneous three times a day before meals  insulin lispro (ADMELOG) corrective regimen sliding scale   SubCutaneous at bedtime  linezolid    Tablet 600 milliGRAM(s) Oral every 12 hours  metoprolol succinate ER 25 milliGRAM(s) Oral daily  piperacillin/tazobactam IVPB.. 3.375 Gram(s) IV Intermittent every 12 hours  sodium bicarbonate 650 milliGRAM(s) Oral every 12 hours    MEDICATIONS  (PRN):  dextrose Oral Gel 15 Gram(s) Oral once PRN Blood Glucose LESS THAN 70 milliGRAM(s)/deciliter      ALLERGIES:  Allergies    vancomycin (Unknown)    Intolerances        LABS:                        7.5    10.46 )-----------( 201      ( 26 Mar 2024 05:30 )             24.0     03-26    132<L>  |  102  |  77<H>  ----------------------------<  133<H>  3.4<L>   |  14<L>  |  3.97<H>    Ca    8.6      26 Mar 2024 05:30  Phos  5.9     03-26  Mg     1.9     03-26    TPro  6.5  /  Alb  3.5  /  TBili  0.2  /  DBili  x   /  AST  18  /  ALT  13  /  AlkPhos  78  03-26      Urinalysis Basic - ( 26 Mar 2024 05:30 )    Color: x / Appearance: x / SG: x / pH: x  Gluc: 133 mg/dL / Ketone: x  / Bili: x / Urobili: x   Blood: x / Protein: x / Nitrite: x   Leuk Esterase: x / RBC: x / WBC x   Sq Epi: x / Non Sq Epi: x / Bacteria: x      CAPILLARY BLOOD GLUCOSE      POCT Blood Glucose.: 194 mg/dL (27 Mar 2024 13:34)      RADIOLOGY & ADDITIONAL TESTS: Reviewed.   OVERNIGHT EVENTS: Vascular rec no acute interventions.     SUBJECTIVE / INTERVAL HPI: Patient seen and examined at bedside.     VITAL SIGNS:  Vital Signs Last 24 Hrs  T(C): 36.4 (27 Mar 2024 06:01), Max: 36.5 (26 Mar 2024 21:50)  T(F): 97.5 (27 Mar 2024 06:01), Max: 97.7 (26 Mar 2024 21:50)  HR: 80 (27 Mar 2024 06:30) (69 - 84)  BP: 123/53 (27 Mar 2024 06:01) (117/51 - 123/53)  BP(mean): 73 (26 Mar 2024 21:50) (73 - 73)  RR: 20 (27 Mar 2024 06:30) (18 - 24)  SpO2: 100% (27 Mar 2024 06:30) (100% - 100%)    Parameters below as of 27 Mar 2024 06:30  Patient On (Oxygen Delivery Method): HOME CPAP      PHYSICAL EXAM:    Constitutional: resting comfortably in bed; mild distress 2/2 foot pain  Head: NC/AT  Eyes: PERRL, EOMI, anicteric sclera  ENT: no nasal discharge; MMM  Neck: supple; no JVD or thyromegaly  Respiratory: CTA B/L; no W/R/R, no retractions; on 2L NC  Cardiac: +S1/S2; RRR; no M/R/G  Gastrointestinal: abdomen soft, +ostomy bag draining billious stool    Extremities: WWP, no clubbing or cyanosis; no peripheral edema.   -per podiatry exam: erythema around Rlateral foot wound extending proximally to met base and dorsally to 5th metatarsal; R lateral 5th metatarsal head with 3 x 2 cm wound with overlying eschar, neg PTB; L distal hallux with 0.7 x 0.5 cm superficial wound with granular base. S/p 2-5 ray amputations on Left  Vascular: 2+ radial, non-palpable DP/PT pulses B/L (pulses dopplerable)  Neurologic: AAOx3;    MEDICATIONS:  MEDICATIONS  (STANDING):  acetaminophen     Tablet .. 975 milliGRAM(s) Oral every 8 hours  apixaban 5 milliGRAM(s) Oral two times a day  atorvastatin 80 milliGRAM(s) Oral at bedtime  clopidogrel Tablet 75 milliGRAM(s) Oral daily  dextrose 5%. 1000 milliLiter(s) (50 mL/Hr) IV Continuous <Continuous>  dextrose 5%. 1000 milliLiter(s) (100 mL/Hr) IV Continuous <Continuous>  dextrose 50% Injectable 25 Gram(s) IV Push once  dextrose 50% Injectable 25 Gram(s) IV Push once  dextrose 50% Injectable 12.5 Gram(s) IV Push once  glucagon  Injectable 1 milliGRAM(s) IntraMuscular once  influenza  Vaccine (HIGH DOSE) 0.7 milliLiter(s) IntraMuscular once  insulin lispro (ADMELOG) corrective regimen sliding scale   SubCutaneous three times a day before meals  insulin lispro (ADMELOG) corrective regimen sliding scale   SubCutaneous at bedtime  linezolid    Tablet 600 milliGRAM(s) Oral every 12 hours  metoprolol succinate ER 25 milliGRAM(s) Oral daily  piperacillin/tazobactam IVPB.. 3.375 Gram(s) IV Intermittent every 12 hours  sodium bicarbonate 650 milliGRAM(s) Oral every 12 hours    MEDICATIONS  (PRN):  dextrose Oral Gel 15 Gram(s) Oral once PRN Blood Glucose LESS THAN 70 milliGRAM(s)/deciliter      ALLERGIES:  Allergies    vancomycin (Unknown)    Intolerances        LABS:                        7.5    10.46 )-----------( 201      ( 26 Mar 2024 05:30 )             24.0     03-26    132<L>  |  102  |  77<H>  ----------------------------<  133<H>  3.4<L>   |  14<L>  |  3.97<H>    Ca    8.6      26 Mar 2024 05:30  Phos  5.9     03-26  Mg     1.9     03-26    TPro  6.5  /  Alb  3.5  /  TBili  0.2  /  DBili  x   /  AST  18  /  ALT  13  /  AlkPhos  78  03-26      Urinalysis Basic - ( 26 Mar 2024 05:30 )    Color: x / Appearance: x / SG: x / pH: x  Gluc: 133 mg/dL / Ketone: x  / Bili: x / Urobili: x   Blood: x / Protein: x / Nitrite: x   Leuk Esterase: x / RBC: x / WBC x   Sq Epi: x / Non Sq Epi: x / Bacteria: x      CAPILLARY BLOOD GLUCOSE      POCT Blood Glucose.: 194 mg/dL (27 Mar 2024 13:34)      RADIOLOGY & ADDITIONAL TESTS: Reviewed.

## 2024-03-27 NOTE — PROGRESS NOTE ADULT - SUBJECTIVE AND OBJECTIVE BOX
Subjective: Patient examined bedside. Dressings changed by podiatry this AM. Continues to complain of Right lateral metatarsal pain. During visit had episode of sudden ankle pain that he described as sharp/stabbing that resolved after 1 minute.    ROS:   Denies Headache, blurred vision, Chest Pain, SOB, Abdominal pain, nausea or vomiting     Social   linezolid    Tablet 600  piperacillin/tazobactam IVPB.. 3.375  apixaban 5  clopidogrel Tablet 75  linezolid    Tablet 600  metoprolol succinate ER 25  piperacillin/tazobactam IVPB.. 3.375      Allergies    vancomycin (Unknown)    Intolerances        Vital Signs Last 24 Hrs  T(C): 36.4 (27 Mar 2024 06:01), Max: 36.5 (26 Mar 2024 21:50)  T(F): 97.5 (27 Mar 2024 06:01), Max: 97.7 (26 Mar 2024 21:50)  HR: 80 (27 Mar 2024 06:30) (69 - 84)  BP: 123/53 (27 Mar 2024 06:01) (117/51 - 129/57)  BP(mean): 73 (26 Mar 2024 21:50) (73 - 73)  RR: 20 (27 Mar 2024 06:30) (17 - 24)  SpO2: 100% (27 Mar 2024 06:30) (100% - 100%)    Parameters below as of 27 Mar 2024 06:30  Patient On (Oxygen Delivery Method): HOME CPAP      I&O's Summary    26 Mar 2024 07:01  -  27 Mar 2024 07:00  --------------------------------------------------------  IN: 0 mL / OUT: 250 mL / NET: -250 mL          PHYSICAL EXAM:   General: Patient is doing well and lying in bed comfortably  Constitutional: awake and alert  Pulm: Nonlabored breathing, no respiratory distress  CV: Regular rate and rhythm, normal sinus rhythm  Abd:  soft, nontender, distended, multiple nodules in the lower quadrants. ostomy bag with air and stool in the RLQ. No rebound, no guarding.   Extremities: right 5th lateral metatarsal head 3x3 wound with overlaying eschar dorsum of foot mild pitting edema. no tenderness to palpation   Vascular:   RLE: Biphasic Fem, Biphasic pop, Biphasic DP, No PT  LLE: Biphasic PT, Monophasic intermittent DP/No regular DP signal      LABS:                        7.5    10.46 )-----------( 201      ( 26 Mar 2024 05:30 )             24.0     03-26    132<L>  |  102  |  77<H>  ----------------------------<  133<H>  3.4<L>   |  14<L>  |  3.97<H>    Ca    8.6      26 Mar 2024 05:30  Phos  5.9     03-26  Mg     1.9     03-26    TPro  6.5  /  Alb  3.5  /  TBili  0.2  /  DBili  x   /  AST  18  /  ALT  13  /  AlkPhos  78  03-26               A&P    The patient is a 68 year old male with a PMHx of AFib, DM c/b diabetic foot wounds s/p left toe amputation, COPD (on 2L O2 at home, BiPAP overnight), CKD 5 (baseline Cr 3.9), CAD, colon ca s/p resection with end colostomy and PAD w/ chronic wounds s/p balloon angioplasty of RLE and possible osteomyelitis s/p PO antibiotics (Augmentin x 2w) now with worsening right foot pain admitted for failed outpatient management of osteomyelitis.   Vascular consulted for continued management of PAD in setting of RLE wounds.     Recommendations    -Arterial duplex reveals no significant hemodynamic stenosis of the lower extremity arterial bed. Venous duplex negative for DVT.  - No acute vascular intervention indicated at this time  pending discussion with chief and attending  [phone]          Subjective: Patient examined bedside. Dressings changed by podiatry this AM. Continues to complain of Right lateral metatarsal pain. During visit had episode of sudden ankle pain that he described as sharp/stabbing that resolved after 1 minute.    ROS:   Denies Headache, blurred vision, Chest Pain, SOB, Abdominal pain, nausea or vomiting     Social   linezolid    Tablet 600  piperacillin/tazobactam IVPB.. 3.375  apixaban 5  clopidogrel Tablet 75  linezolid    Tablet 600  metoprolol succinate ER 25  piperacillin/tazobactam IVPB.. 3.375      Allergies    vancomycin (Unknown)    Intolerances        Vital Signs Last 24 Hrs  T(C): 36.4 (27 Mar 2024 06:01), Max: 36.5 (26 Mar 2024 21:50)  T(F): 97.5 (27 Mar 2024 06:01), Max: 97.7 (26 Mar 2024 21:50)  HR: 80 (27 Mar 2024 06:30) (69 - 84)  BP: 123/53 (27 Mar 2024 06:01) (117/51 - 129/57)  BP(mean): 73 (26 Mar 2024 21:50) (73 - 73)  RR: 20 (27 Mar 2024 06:30) (17 - 24)  SpO2: 100% (27 Mar 2024 06:30) (100% - 100%)    Parameters below as of 27 Mar 2024 06:30  Patient On (Oxygen Delivery Method): HOME CPAP      I&O's Summary    26 Mar 2024 07:01  -  27 Mar 2024 07:00  --------------------------------------------------------  IN: 0 mL / OUT: 250 mL / NET: -250 mL          PHYSICAL EXAM:   General: Patient is doing well and lying in bed comfortably  Constitutional: awake and alert  Pulm: Nonlabored breathing, no respiratory distress  CV: Regular rate and rhythm, normal sinus rhythm  Abd:  soft, nontender, distended, multiple nodules in the lower quadrants. ostomy bag with air and stool in the RLQ. No rebound, no guarding.   Extremities: right 5th lateral metatarsal head 3x3 wound with overlaying eschar dorsum of foot mild pitting edema. no tenderness to palpation   Vascular:   RLE: Biphasic Fem, Biphasic pop, Biphasic DP, No PT  LLE: Biphasic PT, Monophasic intermittent DP/No regular DP signal      LABS:                        7.5    10.46 )-----------( 201      ( 26 Mar 2024 05:30 )             24.0     03-26    132<L>  |  102  |  77<H>  ----------------------------<  133<H>  3.4<L>   |  14<L>  |  3.97<H>    Ca    8.6      26 Mar 2024 05:30  Phos  5.9     03-26  Mg     1.9     03-26    TPro  6.5  /  Alb  3.5  /  TBili  0.2  /  DBili  x   /  AST  18  /  ALT  13  /  AlkPhos  78  03-26               A&P    The patient is a 68 year old male with a PMHx of AFib, DM c/b diabetic foot wounds s/p left toe amputation, COPD (on 2L O2 at home, BiPAP overnight), CKD 5 (baseline Cr 3.9), CAD, colon ca s/p resection with end colostomy and PAD w/ chronic wounds s/p balloon angioplasty of RLE and possible osteomyelitis s/p PO antibiotics (Augmentin x 2w) now with worsening right foot pain admitted for failed outpatient management of osteomyelitis.   Vascular consulted for continued management of PAD in setting of RLE wounds.     Recommendations    - Arterial duplex reveals no significant hemodynamic stenosis of the lower extremity arterial bed. Venous duplex negative for DVT.  - No vascular intervention indicated, service signing off.   - Discussed with chief and attending  - Please call with additional questions or concerns, [phone]

## 2024-03-27 NOTE — CONSULT NOTE ADULT - ATTENDING COMMENTS
67 yo man with CKD admitted for management of R foot osteomyelitis currently on Zosyn/Linezolid  h/o PAD, COPD, on home O2, s/p colectomy  with colostomy for colon cancer,   admit creat 4.2,   Non oliguric  probable KAT on CKD, though baseline unclear at this time- to investigate  could have component of prerenal azotemia given standard GI losses and perhaps some reduce oral intake- agree with ongoing IVFs  needs labs and sono as outlined above

## 2024-03-27 NOTE — CONSULT NOTE ADULT - SUBJECTIVE AND OBJECTIVE BOX
HPI:  HPI: Patient is a 69yo M with PMH of Chronic fib, DM, diabetic wounds s/p left toe amputations, COPD (on home 2L, on BIPAP overnight), Stage 5 CKD (baseline Cr 3.9), ?CAD, PAD, and colon cancer (w/ colostomy bag) who presents with right toe pain/wound for the past 6-7 weeks. Reportedly patient had outpatient XR of the foot 3 days ago which were concerning for "bone infection". States he was initially seen by his vascular surgeon, who "ballooned the veins" in his leg to improve blood flow, no stents placed at the time. Has been on doxycycline 100mg bid outpatient for the past 2 weeks without improvement. A few days ago, he developed worsening pain on his right foot with erythema and blistering. Describes intense pain and inability to bear weight on the right foot which brought him in to the ED. Of note, patient reports an allergy to IV antibiotic that causes angioedema and some difficulty breathing, does not recall whether it was vancomycin or daptomycin.   C/o foot pain, denies all other somatic complaints.   Per podiatry, Erythema around right lateral foot wound extending proximally to met base and dorsally to 5th metatarsal  Derm: Right lateral 5th metatrasl head with 3 x 2 cm wound with overlying eschar, (-)PTB, (+)malodor, (-)fluctuance, (-)tracking, (-)tunneling  Left distal hallux with hyperkeratotic lesion debrided to reveal 0.7 x 0.5 cm superficial wound with granular base, does not track deep.      In the ED:  Vital Signs: T 98.4 F, HR 87, /57, RR 17, SpO2 100% on 2L NC  Labs: significant for ESR 85, CRP 43.3, WBC 10.66, Hgb 7.8, HCO3 16, BUN/Cr 81/4.20, lactate 0.5  XR R foot: pending read; wet read c/f osteomyelitis  CT R foot: pending read  EKG: pending  Interventions: zosyn 3.375g IV x 1, not given vanc due to concern for allergy  Consults: podiatry   (26 Mar 2024 04:45)      PAST MEDICAL & SURGICAL HISTORY:  COPD with hypoxia      Chronic kidney disease (CKD)      CAD (coronary artery disease)      Type 2 diabetes mellitus      Diabetic foot ulcers      PAD (peripheral artery disease)      Status post amputation of toe            REVIEW OF SYSTEMS:    General:	 no weakness; no fevers, no chills  Skin/Breast: no rash  Respiratory and Thorax: no SOB, no cough  Cardiovascular:	No chest pain  Gastrointestinal:	 no nausea, vomiting , diarrhea  Genitourinary:	no dysuria, no difficulty urinating, no hematuria  Musculoskeletal:	no weakness, no joint swelling/pain  Neurological:	no focal weakness/numbness  Endocrine:	no polyuria, no polydipsia      ANTIBIOTICS:  MEDICATIONS  (STANDING):  acetaminophen     Tablet .. 975 milliGRAM(s) Oral every 8 hours  apixaban 5 milliGRAM(s) Oral two times a day  atorvastatin 80 milliGRAM(s) Oral at bedtime  clopidogrel Tablet 75 milliGRAM(s) Oral daily  dextrose 5%. 1000 milliLiter(s) (100 mL/Hr) IV Continuous <Continuous>  dextrose 5%. 1000 milliLiter(s) (50 mL/Hr) IV Continuous <Continuous>  dextrose 50% Injectable 12.5 Gram(s) IV Push once  dextrose 50% Injectable 25 Gram(s) IV Push once  dextrose 50% Injectable 25 Gram(s) IV Push once  glucagon  Injectable 1 milliGRAM(s) IntraMuscular once  influenza  Vaccine (HIGH DOSE) 0.7 milliLiter(s) IntraMuscular once  insulin lispro (ADMELOG) corrective regimen sliding scale   SubCutaneous three times a day before meals  insulin lispro (ADMELOG) corrective regimen sliding scale   SubCutaneous at bedtime  linezolid    Tablet 600 milliGRAM(s) Oral every 12 hours  piperacillin/tazobactam IVPB.. 3.375 Gram(s) IV Intermittent every 12 hours  sodium bicarbonate 650 milliGRAM(s) Oral every 12 hours    MEDICATIONS  (PRN):  dextrose Oral Gel 15 Gram(s) Oral once PRN Blood Glucose LESS THAN 70 milliGRAM(s)/deciliter      Allergies    vancomycin (Unknown)    Intolerances        SOCIAL HISTORY: Resides in [place]  Denies toxic habits    FAMILY HISTORY:  FH: diabetes mellitus        Vital Signs Last 24 Hrs  T(C): 36.4 (27 Mar 2024 21:14), Max: 36.4 (27 Mar 2024 06:01)  T(F): 97.5 (27 Mar 2024 21:14), Max: 97.6 (27 Mar 2024 15:37)  HR: 68 (27 Mar 2024 21:14) (68 - 80)  BP: 117/52 (27 Mar 2024 21:14) (117/52 - 123/53)  BP(mean): --  RR: 18 (27 Mar 2024 21:14) (18 - 20)  SpO2: 100% (27 Mar 2024 21:14) (100% - 100%)    Parameters below as of 27 Mar 2024 21:14  Patient On (Oxygen Delivery Method): room air        03-26-24 @ 07:01  -  03-27-24 @ 07:00  --------------------------------------------------------  IN: 0 mL / OUT: 250 mL / NET: -250 mL    03-27-24 @ 07:01  -  03-28-24 @ 04:33  --------------------------------------------------------  IN: 0 mL / OUT: 650 mL / NET: -650 mL        PHYSICAL EXAM:  Constitutional:obese male, NAD  Eyes:HEATHER, EOMI  Ear/Nose/Throat: no oral lesions  Neck:no JVD, no lymphadenopathy, supple  Respiratory: CTA som  Cardiovascular: S1S2 RRR, no murmurs  Gastrointestinal:soft, (+) BS, no HSM  Extremities: dressing c/d/i, did not permit uncovering             LABS:                        7.6    8.85  )-----------( 217      ( 27 Mar 2024 14:56 )             23.8     03-27    136  |  108  |  79<H>  ----------------------------<  169<H>  3.5   |  16<L>  |  3.95<H>    Ca    8.9      27 Mar 2024 14:56  Phos  5.9     03-26  Mg     1.9     03-26    TPro  6.2  /  Alb  3.1<L>  /  TBili  0.2  /  DBili  x   /  AST  16  /  ALT  12  /  AlkPhos  64  03-27      Urinalysis Basic - ( 27 Mar 2024 14:56 )    Color: x / Appearance: x / SG: x / pH: x  Gluc: 169 mg/dL / Ketone: x  / Bili: x / Urobili: x   Blood: x / Protein: x / Nitrite: x   Leuk Esterase: x / RBC: x / WBC x   Sq Epi: x / Non Sq Epi: x / Bacteria: x        MICROBIOLOGY:    Culture - Blood (collected 26 Mar 2024 03:21)  Source: .Blood Blood-Peripheral  Preliminary Report (27 Mar 2024 09:01):    No growth at 1 day.    Culture - Blood (collected 26 Mar 2024 03:21)  Source: .Blood Blood-Peripheral  Gram Stain (27 Mar 2024 06:55):    Aerobic Bottle: Gram Positive Cocci in Clusters    Result called to and read back byCarrie DE LA GARZA RN ([id number]) 03/27/2024 06:55:26    ***Blood Panel PCR results on this specimen are available    approximately 3 hours after the Gram stain result.***    Gram stain, PCR, and/or culture results may not always    correspond due to difference in methodologies.    ************************************************************    This PCR assay was performed using Ser[employer] BCID2 panel.    This assay tests for bacterial, fungal and resistance gene    targets.  Preliminary Report (27 Mar 2024 06:56):    Culture in progress  Organism: Blood Culture PCR (27 Mar 2024 09:10)  Organism: Blood Culture PCR (27 Mar 2024 09:10)      RADIOLOGY & ADDITIONAL STUDIES:      ACC: [id number] EXAM:  MR FOOT RT   ORDERED BY: GABBIE DURAN     PROCEDURE DATE:  03/26/2024          INTERPRETATION:  EXAMINATION: MR FOOT RIGHT    CLINICAL INDICATION:Evaluate for osteomyelitis.    COMPARISON: CT foot dated 3/26/2024 and foot radiographs dated 3/26/2024    TECHNIQUE: Multiplanar, multi-sequence MRI of the right forefoot/midfoot   was performed without intravenous contrast.    INTERPRETATION:    There is a plantar/lateral foot ulcer underlying the fifth ray with   surrounding soft tissue edema and underlying T1 hypointense patchy signal   abnormality with corresponding STIR hyperintensity within the fifth   metatarsal head and fifth digit proximal phalanx consistent with   osteomyelitis. There is dorsal foot soft tissue edema. There is edema and   atrophy of the intrinsic muscles of the foot, which may be seen in   neuropathy. There is cystic degenerative change at the base of the second   digit proximal phalanx which appears degenerative.    IMPRESSION:  Osteomyelitis of the fifth metatarsal head and fifth digit proximal   phalanx with adjacent soft tissue ulceration and phlegmon.    --- End of Report ---            SHLOMIT GOLDBERG-STEIN MD; Attending Radiologist  This document has been electronically signed. Mar 26 2024  4:25PM

## 2024-03-27 NOTE — CONSULT NOTE ADULT - SUBJECTIVE AND OBJECTIVE BOX
NEPHROLOGY CONSULTATION NOTE    67 y/o M [facility] PMH of PAD, COPD (on home 2L), ?CKD (unknown baseline Cr), Colon cancer (w/ colostomy bag) who presents with right toe pain/wound for the past 6-7 weeks, admitted for osteomyelitis of R foot. Noted sCr of 4.2, currently on Zosyn/Linezolid, Nephrology consulted.       PAST MEDICAL & SURGICAL HISTORY:  COPD with hypoxia  Chronic kidney disease (CKD)  CAD (coronary artery disease)  Type 2 diabetes mellitus  Diabetic foot ulcers  PAD (peripheral artery disease)  Status post amputation of toe    Allergies:  vancomycin (Unknown)    Home Medications Reviewed  Hospital Medications:   MEDICATIONS  (STANDING):  acetaminophen     Tablet .. 975 milliGRAM(s) Oral every 8 hours  apixaban 5 milliGRAM(s) Oral two times a day  atorvastatin 80 milliGRAM(s) Oral at bedtime  clopidogrel Tablet 75 milliGRAM(s) Oral daily  dextrose 5%. 1000 milliLiter(s) (50 mL/Hr) IV Continuous <Continuous>  dextrose 5%. 1000 milliLiter(s) (100 mL/Hr) IV Continuous <Continuous>  dextrose 50% Injectable 25 Gram(s) IV Push once  dextrose 50% Injectable 25 Gram(s) IV Push once  dextrose 50% Injectable 12.5 Gram(s) IV Push once  glucagon  Injectable 1 milliGRAM(s) IntraMuscular once  influenza  Vaccine (HIGH DOSE) 0.7 milliLiter(s) IntraMuscular once  insulin lispro (ADMELOG) corrective regimen sliding scale   SubCutaneous three times a day before meals  insulin lispro (ADMELOG) corrective regimen sliding scale   SubCutaneous at bedtime  linezolid    Tablet 600 milliGRAM(s) Oral every 12 hours  metoprolol succinate ER 25 milliGRAM(s) Oral daily  piperacillin/tazobactam IVPB.. 3.375 Gram(s) IV Intermittent every 12 hours  sodium bicarbonate 650 milliGRAM(s) Oral every 12 hours    SOCIAL HISTORY:  Denies ETOH,Smoking,   FAMILY HISTORY:  FH: diabetes mellitus    REVIEW OF SYSTEMS:  All other review of systems is negative unless indicated above.    VITALS:  Vital Signs Last 24 Hrs  T(C): 36.4 (27 Mar 2024 06:01), Max: 36.5 (26 Mar 2024 21:50)  T(F): 97.5 (27 Mar 2024 06:01), Max: 97.7 (26 Mar 2024 21:50)  HR: 80 (27 Mar 2024 06:30) (69 - 84)  BP: 123/53 (27 Mar 2024 06:01) (117/51 - 123/53)  BP(mean): 73 (26 Mar 2024 21:50) (73 - 73)  RR: 20 (27 Mar 2024 06:30) (18 - 24)  SpO2: 100% (27 Mar 2024 06:30) (100% - 100%)    Parameters below as of 27 Mar 2024 06:30  Patient On (Oxygen Delivery Method): HOME CPAP        03-26 @ 07:01  -  03-27 @ 07:00  --------------------------------------------------------  IN: 0 mL / OUT: 250 mL / NET: -250 mL    PHYSICAL EXAM:  Neck: supple; no JVD  Respiratory: CTA B/L; on 2L NC  Cardiac: RRR; no M/R/G  Gastrointestinal: abdomen soft, NT/ND; no rebound or guarding  Extremities: no peripheral edema, R foot wounds covered in gauze  Neurologic: AAOx3; no focal deficits    LABS:  03-26    132<L>  |  102  |  77<H>  ----------------------------<  133<H>  3.4<L>   |  14<L>  |  3.97<H>    Ca    8.6      26 Mar 2024 05:30  Phos  5.9     03-26  Mg     1.9     03-26    TPro  6.5  /  Alb  3.5  /  TBili  0.2  /  DBili      /  AST  18  /  ALT  13  /  AlkPhos  78  03-26    Creatinine Trend: 3.97 <--, 4.20 <--                        7.5    10.46 )-----------( 201      ( 26 Mar 2024 05:30 )             24.0     Urine Studies:  Urinalysis Basic - ( 26 Mar 2024 05:30 )    Color:  / Appearance:  / SG:  / pH:   Gluc: 133 mg/dL / Ketone:   / Bili:  / Urobili:    Blood:  / Protein:  / Nitrite:    Leuk Esterase:  / RBC:  / WBC    Sq Epi:  / Non Sq Epi:  / Bacteria:         RADIOLOGY & ADDITIONAL STUDIES: Reviewed.

## 2024-03-27 NOTE — PROGRESS NOTE ADULT - PROBLEM SELECTOR PLAN 1
#Osteomyelitis R foot  Patient c/o right foot pain x 6-7 weeks.  Hx of diabetic foot infection with prior left toe amputation. On doxycycline outpatient x 2 weeks without improvement. Follows with vascular at [facility] for PAD.  On podiatry exam, erythema around R lateral foot wound extending proximally to met base and dorsally to 5th metatarsal; Right lateral 5th metatarsal head with 3 x 2 cm wound with overlying eschar, neg PTB or fluctuance  Labs significant for elevated ESR/CRP, mild leukocytosis.  Podiatry consulted. Given zosyn 3.375g x 1 in the ED. Concern for prior allergy to vancomycin vs Daptomycin?   - XR R foot: No convincing plain radiographic evidence for acute osteomyelitis.  - XR L foot : No tracking gas collections or gross radiographic evidence for osteomyelitis however if concern persists MRI suggested to further assess.  - CT R foot: No CT evidence of osteomyelitis. However, MRI would be a more sensitive test to evaluate for osteomyelitis.  - hold off on vancomycin or dapto given unclear allergy history and patient stable   - MRI of R foot (3/26): Osteomyelitis of the fifth metatarsal head and fifth digit proximal phalanx with adjacent soft tissue ulceration and phlegmon.  - BCx + gram + cocci, Staph Epi   - ID consulted, f/u recs   - c/w zosyn w/ pseudomonal dosing and Linezolid q12   - f/u Surveillance blood cultures  - f/u podiatry recs

## 2024-03-27 NOTE — PROGRESS NOTE ADULT - SUBJECTIVE AND OBJECTIVE BOX
Patient is a 68y old  Male who presents with a chief complaint of osteomyelitis of foot (26 Mar 2024 22:19)      INTERVAL HPI/ OVERNIGHT EVENTS  Pt evaluated bedside Dressing c/d/i.       LABS                        7.5    10.46 )-----------( 201      ( 26 Mar 2024 05:30 )             24.0     03-26    132<L>  |  102  |  77<H>  ----------------------------<  133<H>  3.4<L>   |  14<L>  |  3.97<H>    Ca    8.6      26 Mar 2024 05:30  Phos  5.9     03-26  Mg     1.9     03-26    TPro  6.5  /  Alb  3.5  /  TBili  0.2  /  DBili  x   /  AST  18  /  ALT  13  /  AlkPhos  78  03-26        ICU Vital Signs Last 24 Hrs  T(C): 36.4 (27 Mar 2024 06:01), Max: 36.5 (26 Mar 2024 21:50)  T(F): 97.5 (27 Mar 2024 06:01), Max: 97.7 (26 Mar 2024 21:50)  HR: 80 (27 Mar 2024 06:30) (69 - 84)  BP: 123/53 (27 Mar 2024 06:01) (117/51 - 129/57)  BP(mean): 73 (26 Mar 2024 21:50) (73 - 73)  ABP: --  ABP(mean): --  RR: 20 (27 Mar 2024 06:30) (17 - 24)  SpO2: 100% (27 Mar 2024 06:30) (100% - 100%)    O2 Parameters below as of 27 Mar 2024 06:30  Patient On (Oxygen Delivery Method): HOME CPAP            RADIOLOGY    < from: MR Foot No Cont, Right (03.26.24 @ 16:17) >  IMPRESSION:  Osteomyelitisof the fifth metatarsal head and fifth digit proximal   phalanx with adjacent soft tissue ulceration and phlegmon.    --- End of Report ---    < end of copied text >    < from: CT Foot No Cont, Right (03.26.24 @ 02:25) >  IMPRESSION:    Nonspecific subcu< from: Xray Foot AP + Lateral, Right (03.26.24 @ 02:24) >    IMPRESSION:  No convincing plain radiographic evidence for acute osteomyelitis.    --- End of Report ---    < end of copied text >  taneous edema about the foot, which may be related to   lower extremity edema or cellulitis. No soft tissue gas seen.    No CT evidence of osteomyelitis. However, MRI would be a more sensitive   test to evaluate for osteomyelitis.    Curvilinear lucency within the base of the second proximal phalanx, which   may be related to old trauma or be related to an acute nondisplaced   intra-articular fracture. Correlation with point tenderness is   recommended.    --- End of Report ---    < end of copied text >    MICROBIOLOGY    PHYSICAL EXAM  Lower Extremity Focused  Vasc: PT/DP non-palpable b/l -bipasic waveforms heard at b/l DP and monophasic waveforms heard at b/l PT; Erythema around right lateral foot wound extending proximally to met base and dorsally to 5th metatarsal  Derm: Right lateral 5th metatrasl head with 3 x 2 cm wound with overlying eschar, (-)PTB, (+)malodor, (-)fluctuance, (-)tracking, (-)tunneling  Left distal hallux with hyperkeratotic lesion debrided to reveal 0.7 x 0.5 cm superficial wound with granular base, does not track deep  Neuro: protective sensation in tact  MSK: s/p 2-5 ray amputations on Left

## 2024-03-27 NOTE — CONSULT NOTE ADULT - ASSESSMENT
67 y/o M wiht PMH of PAD, COPD (on home 2L), ?CKD (unknown baseline Cr), Colon cancer (w/ colostomy bag) who presents with right toe pain/wound for the past 6-7 weeks, admitted for osteomyelitis of R foot. Noted sCr of 4.2, currently on Zosyn/Linezolid, Nephrology consulted.           Imp: Non oliguric KAT on CKD. Unknown baseline sCr.   Currently on renally dosed Zosyn. Linezolid does not need adjustments for renal impairment.   No am labs today  I&O: not reliable.     Plan:  Obtain UA, U. Na and UPCR.   Renal US  Daily labs  Strict I&O  Avoid hypotension, SBP>110 for renal perfusion.   Renal diet  Renally dosed medications.    67 y/o M wiht PMH of PAD, COPD (on home 2L), ?CKD (unknown baseline Cr), Colon cancer (w/ colostomy bag) who presents with right toe pain/wound for the past 6-7 weeks, admitted for osteomyelitis of R foot. Noted sCr of 4.2, currently on Zosyn/Linezolid, Nephrology consulted.           Imp: Non oliguric KAT on CKD. Unknown baseline sCr.   Currently on renally dosed Zosyn. Linezolid does not need adjustments for renal impairment.   No am labs today  I&O: not reliable.     Plan:  Obtain UA, U. Na and UPCR.   Renal US  Obtain CPK and Cystatin C.   Daily labs  Strict I&O  Avoid hypotension, SBP>110 for renal perfusion.   Renal diet  Renally dosed medications.    67 y/o M with PMH of PAD, COPD (on home 2L), ?CKD (unknown baseline Cr), Colon cancer (w/ colostomy bag) who presents with right toe pain/wound for the past 6-7 weeks, admitted for osteomyelitis of R foot. Noted sCr of 4.2, currently on Zosyn/Linezolid, Nephrology consulted.           Imp: Non oliguric KAT on CKD. Unknown baseline sCr.   Currently on renally dosed Zosyn. Linezolid does not need adjustments for renal impairment.   No am labs today  I&O: not reliable.     Plan:  Obtain UA, U. Na and UPCR.   Renal US  Obtain CPK and Cystatin C.   Daily labs  Strict I&O  Avoid hypotension, SBP>110 for renal perfusion.   Renal diet  Renally dosed medications.

## 2024-03-28 ENCOUNTER — TRANSCRIPTION ENCOUNTER (OUTPATIENT)
Age: 69
End: 2024-03-28

## 2024-03-28 LAB
ALBUMIN SERPL ELPH-MCNC: 2.9 G/DL — LOW (ref 3.3–5)
ALP SERPL-CCNC: 64 U/L — SIGNIFICANT CHANGE UP (ref 40–120)
ALT FLD-CCNC: 13 U/L — SIGNIFICANT CHANGE UP (ref 10–45)
ANION GAP SERPL CALC-SCNC: 12 MMOL/L — SIGNIFICANT CHANGE UP (ref 5–17)
AST SERPL-CCNC: 17 U/L — SIGNIFICANT CHANGE UP (ref 10–40)
BASOPHILS # BLD AUTO: 0.03 K/UL — SIGNIFICANT CHANGE UP (ref 0–0.2)
BASOPHILS NFR BLD AUTO: 0.4 % — SIGNIFICANT CHANGE UP (ref 0–2)
BILIRUB SERPL-MCNC: 0.2 MG/DL — SIGNIFICANT CHANGE UP (ref 0.2–1.2)
BLD GP AB SCN SERPL QL: NEGATIVE — SIGNIFICANT CHANGE UP
BUN SERPL-MCNC: 72 MG/DL — HIGH (ref 7–23)
CALCIUM SERPL-MCNC: 8.8 MG/DL — SIGNIFICANT CHANGE UP (ref 8.4–10.5)
CHLORIDE SERPL-SCNC: 108 MMOL/L — SIGNIFICANT CHANGE UP (ref 96–108)
CK SERPL-CCNC: 53 U/L — SIGNIFICANT CHANGE UP (ref 30–200)
CO2 SERPL-SCNC: 14 MMOL/L — LOW (ref 22–31)
CREAT SERPL-MCNC: 4.04 MG/DL — HIGH (ref 0.5–1.3)
CYSTATIN C SERPL-MCNC: 4.33 MG/L — HIGH (ref 0.77–1.42)
EGFR: 15 ML/MIN/1.73M2 — LOW
EOSINOPHIL # BLD AUTO: 0.19 K/UL — SIGNIFICANT CHANGE UP (ref 0–0.5)
EOSINOPHIL NFR BLD AUTO: 2.2 % — SIGNIFICANT CHANGE UP (ref 0–6)
GFR/BSA.PRED SERPLBLD CYS-BASED-ARV: 11 ML/MIN/1.73M2 — LOW
GLUCOSE BLDC GLUCOMTR-MCNC: 105 MG/DL — HIGH (ref 70–99)
GLUCOSE BLDC GLUCOMTR-MCNC: 162 MG/DL — HIGH (ref 70–99)
GLUCOSE BLDC GLUCOMTR-MCNC: 182 MG/DL — HIGH (ref 70–99)
GLUCOSE SERPL-MCNC: 109 MG/DL — HIGH (ref 70–99)
HCT VFR BLD CALC: 25.5 % — LOW (ref 39–50)
HGB BLD-MCNC: 8 G/DL — LOW (ref 13–17)
IMM GRANULOCYTES NFR BLD AUTO: 0.5 % — SIGNIFICANT CHANGE UP (ref 0–0.9)
LYMPHOCYTES # BLD AUTO: 2.42 K/UL — SIGNIFICANT CHANGE UP (ref 1–3.3)
LYMPHOCYTES # BLD AUTO: 28.2 % — SIGNIFICANT CHANGE UP (ref 13–44)
MAGNESIUM SERPL-MCNC: 2 MG/DL — SIGNIFICANT CHANGE UP (ref 1.6–2.6)
MCHC RBC-ENTMCNC: 28.3 PG — SIGNIFICANT CHANGE UP (ref 27–34)
MCHC RBC-ENTMCNC: 31.4 GM/DL — LOW (ref 32–36)
MCV RBC AUTO: 90.1 FL — SIGNIFICANT CHANGE UP (ref 80–100)
MONOCYTES # BLD AUTO: 0.84 K/UL — SIGNIFICANT CHANGE UP (ref 0–0.9)
MONOCYTES NFR BLD AUTO: 9.8 % — SIGNIFICANT CHANGE UP (ref 2–14)
NEUTROPHILS # BLD AUTO: 5.05 K/UL — SIGNIFICANT CHANGE UP (ref 1.8–7.4)
NEUTROPHILS NFR BLD AUTO: 58.9 % — SIGNIFICANT CHANGE UP (ref 43–77)
NRBC # BLD: 0 /100 WBCS — SIGNIFICANT CHANGE UP (ref 0–0)
PHOSPHATE SERPL-MCNC: 5.1 MG/DL — HIGH (ref 2.5–4.5)
PLATELET # BLD AUTO: 235 K/UL — SIGNIFICANT CHANGE UP (ref 150–400)
POTASSIUM SERPL-MCNC: 3.6 MMOL/L — SIGNIFICANT CHANGE UP (ref 3.5–5.3)
POTASSIUM SERPL-SCNC: 3.6 MMOL/L — SIGNIFICANT CHANGE UP (ref 3.5–5.3)
PROT SERPL-MCNC: 6 G/DL — SIGNIFICANT CHANGE UP (ref 6–8.3)
RBC # BLD: 2.83 M/UL — LOW (ref 4.2–5.8)
RBC # FLD: 14.2 % — SIGNIFICANT CHANGE UP (ref 10.3–14.5)
RH IG SCN BLD-IMP: POSITIVE — SIGNIFICANT CHANGE UP
SODIUM SERPL-SCNC: 134 MMOL/L — LOW (ref 135–145)
WBC # BLD: 8.57 K/UL — SIGNIFICANT CHANGE UP (ref 3.8–10.5)
WBC # FLD AUTO: 8.57 K/UL — SIGNIFICANT CHANGE UP (ref 3.8–10.5)

## 2024-03-28 PROCEDURE — 99233 SBSQ HOSP IP/OBS HIGH 50: CPT | Mod: GC

## 2024-03-28 PROCEDURE — 99232 SBSQ HOSP IP/OBS MODERATE 35: CPT

## 2024-03-28 PROCEDURE — 99233 SBSQ HOSP IP/OBS HIGH 50: CPT

## 2024-03-28 RX ORDER — HYDROMORPHONE HYDROCHLORIDE 2 MG/ML
2 INJECTION INTRAMUSCULAR; INTRAVENOUS; SUBCUTANEOUS EVERY 4 HOURS
Refills: 0 | Status: DISCONTINUED | OUTPATIENT
Start: 2024-03-28 | End: 2024-03-29

## 2024-03-28 RX ADMIN — APIXABAN 5 MILLIGRAM(S): 2.5 TABLET, FILM COATED ORAL at 19:38

## 2024-03-28 RX ADMIN — Medication 975 MILLIGRAM(S): at 07:09

## 2024-03-28 RX ADMIN — Medication 650 MILLIGRAM(S): at 22:11

## 2024-03-28 RX ADMIN — HYDROMORPHONE HYDROCHLORIDE 2 MILLIGRAM(S): 2 INJECTION INTRAMUSCULAR; INTRAVENOUS; SUBCUTANEOUS at 22:08

## 2024-03-28 RX ADMIN — Medication 2: at 13:27

## 2024-03-28 RX ADMIN — PIPERACILLIN AND TAZOBACTAM 25 GRAM(S): 4; .5 INJECTION, POWDER, LYOPHILIZED, FOR SOLUTION INTRAVENOUS at 11:38

## 2024-03-28 RX ADMIN — Medication 975 MILLIGRAM(S): at 22:10

## 2024-03-28 RX ADMIN — LINEZOLID 600 MILLIGRAM(S): 600 INJECTION, SOLUTION INTRAVENOUS at 19:39

## 2024-03-28 RX ADMIN — LINEZOLID 600 MILLIGRAM(S): 600 INJECTION, SOLUTION INTRAVENOUS at 07:06

## 2024-03-28 RX ADMIN — ATORVASTATIN CALCIUM 80 MILLIGRAM(S): 80 TABLET, FILM COATED ORAL at 22:11

## 2024-03-28 RX ADMIN — Medication 650 MILLIGRAM(S): at 11:38

## 2024-03-28 RX ADMIN — CLOPIDOGREL BISULFATE 75 MILLIGRAM(S): 75 TABLET, FILM COATED ORAL at 11:38

## 2024-03-28 NOTE — PROGRESS NOTE ADULT - SUBJECTIVE AND OBJECTIVE BOX
INTERVAL HPI/OVERNIGHT EVENTS:    Patient was seen and examined at bedside.  No events    CONSTITUTIONAL:  Negative fever or chills, feels well, good appetite  EYES:  Negative  blurry vision or double vision  CARDIOVASCULAR:  Negative for chest pain or palpitations  RESPIRATORY:  Negative for cough, wheezing, or SOB   GASTROINTESTINAL:  Negative for nausea, vomiting, diarrhea, constipation, or abdominal pain  GENITOURINARY:  Negative frequency, urgency or dysuria  NEUROLOGIC:  No headache, confusion, dizziness, lightheadedness      ANTIBIOTICS/RELEVANT:    MEDICATIONS  (STANDING):  acetaminophen     Tablet .. 975 milliGRAM(s) Oral every 8 hours  apixaban 5 milliGRAM(s) Oral two times a day  atorvastatin 80 milliGRAM(s) Oral at bedtime  clopidogrel Tablet 75 milliGRAM(s) Oral daily  dextrose 5%. 1000 milliLiter(s) (100 mL/Hr) IV Continuous <Continuous>  dextrose 5%. 1000 milliLiter(s) (50 mL/Hr) IV Continuous <Continuous>  dextrose 50% Injectable 12.5 Gram(s) IV Push once  dextrose 50% Injectable 25 Gram(s) IV Push once  dextrose 50% Injectable 25 Gram(s) IV Push once  glucagon  Injectable 1 milliGRAM(s) IntraMuscular once  influenza  Vaccine (HIGH DOSE) 0.7 milliLiter(s) IntraMuscular once  insulin lispro (ADMELOG) corrective regimen sliding scale   SubCutaneous three times a day before meals  insulin lispro (ADMELOG) corrective regimen sliding scale   SubCutaneous at bedtime  linezolid    Tablet 600 milliGRAM(s) Oral every 12 hours  piperacillin/tazobactam IVPB.. 3.375 Gram(s) IV Intermittent every 12 hours  sodium bicarbonate 650 milliGRAM(s) Oral every 12 hours    MEDICATIONS  (PRN):  dextrose Oral Gel 15 Gram(s) Oral once PRN Blood Glucose LESS THAN 70 milliGRAM(s)/deciliter        Vital Signs Last 24 Hrs  T(C): 36.8 (28 Mar 2024 10:51), Max: 36.8 (28 Mar 2024 05:24)  T(F): 98.2 (28 Mar 2024 10:51), Max: 98.2 (28 Mar 2024 05:24)  HR: 69 (28 Mar 2024 10:51) (66 - 77)  BP: 114/55 (28 Mar 2024 10:51) (112/50 - 117/52)  BP(mean): 71 (28 Mar 2024 05:24) (71 - 71)  RR: 18 (28 Mar 2024 10:51) (18 - 19)  SpO2: 100% (28 Mar 2024 10:51) (100% - 100%)    Parameters below as of 28 Mar 2024 10:51  Patient On (Oxygen Delivery Method): nasal cannula  O2 Flow (L/min): 2      PHYSICAL EXAM:  Constitutional: non-toxic, no distress  Eyes:HEATHER, EOMI  Ear/Nose/Throat: no oral lesion, no sinus tenderness on percussion	  Neck:  supple  Respiratory: CTA som  Cardiovascular: S1S2 RRR, no murmurs  Gastrointestinal:soft, (+) BS, no HSM  Extremities: left foot s/p partial amputation, amputation site without erythema, drainage  Vascular: DP Pulse:	right normal; left normal      LABS:                        8.0    8.57  )-----------( 235      ( 28 Mar 2024 07:12 )             25.5     03-28    134<L>  |  108  |  72<H>  ----------------------------<  109<H>  3.6   |  14<L>  |  4.04<H>    Ca    8.8      28 Mar 2024 07:12  Phos  5.1     03-28  Mg     2.0     03-28    TPro  6.0  /  Alb  2.9<L>  /  TBili  0.2  /  DBili  x   /  AST  17  /  ALT  13  /  AlkPhos  64  03-28      Urinalysis Basic - ( 28 Mar 2024 07:12 )    Color: x / Appearance: x / SG: x / pH: x  Gluc: 109 mg/dL / Ketone: x  / Bili: x / Urobili: x   Blood: x / Protein: x / Nitrite: x   Leuk Esterase: x / RBC: x / WBC x   Sq Epi: x / Non Sq Epi: x / Bacteria: x        MICROBIOLOGY:    Culture - Blood (03.27.24 @ 16:25)    Specimen Source: .Blood Blood   Culture Results:   No growth at 12 hours        Culture - Blood (03.26.24 @ 03:21)    Specimen Source: .Blood Blood-Peripheral   Culture Results:   No growth at 2 days.    RADIOLOGY & ADDITIONAL STUDIES:    < from: Xray Foot AP + Lateral + Oblique, Left (03.26.24 @ 06:53) >    Vascular calcifications. Partially visualized distal end of a metallic   mesh vascular stent in posterior distal calf region at superior image   margin on lateral view.    < end of copied text >

## 2024-03-28 NOTE — PROGRESS NOTE ADULT - ASSESSMENT
Spoke with pt's daughter over the phone who reviewed pt's labs from his op Nephrologist office sCr baseline 3.8-4.2 (since 6/2023).   Pts is currently at baseline.     Imp: CKD at baseline.   sCr baseline 3.8-4.2 (since 6/2023).  Cystatin C 4.3  CPK wnl    Plan:  Obtain UA, U. Na and UPCR.   Daily labs  Strict I&O  Avoid hypotension, SBP>110 for renal perfusion.   Renal diet  Cont. renally dosed medications   Spoke with pt's daughter over the phone who reviewed pt's labs from his op Nephrologist office sCr baseline 3.8-4.2 (since 6/2023).   Pts is currently at baseline.     Imp: CKD at baseline.   sCr baseline 3.8-4.2 (since 6/2023).  Cystatin C 4.3  CPK wnl  HCO3 14    Plan:  Obtain UA, U. Na and UPCR.   Daily labs  Strict I&O  Avoid hypotension, SBP>110 for renal perfusion.   Renal diet  Cont. renally dosed medications  Increase HCO3 tabs to 1300 TID.   T. sat 19, holding IV Fe supplement iso active Osteomyelitis.

## 2024-03-28 NOTE — PROGRESS NOTE ADULT - PROBLEM SELECTOR PLAN 4
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 No s/s of active bleeding. Per old lab review, Hgb was 9 in 1/2024.   Hgb currently 7.5     - F/u iron studies  - maintain active T&S  - transfuse for hgb < 7 Likely AOCD iso chronic kidney disease. No s/s of active bleeding. Per old lab review, Hgb was 9 in 1/2024.   Hgb currently 8    - F/u iron studies  - maintain active T&S  - transfuse for hgb < 7

## 2024-03-28 NOTE — PROGRESS NOTE ADULT - ASSESSMENT
67 y/o M pmh PVD, DM, COPD presents for right foot wound and cellulitis. At time of consult, VSS, WBC 10.66, CRP 43.3, ESR 85. R foot x-ray wet read with cortical erosions of 5th metatarsal head concerning for osteomyelitis. High suspicion for Osteomyelitis of Right foot.     Plan:   - C/w IV abx  - Local wound care: wounds cleansed with normal saline. Betadine DSD applied to Right foot wound and WTD dressing applied to R foot wound.   - Pt should heel WBAT to RLE  - Rest of care per primary team    Plan d/w Attending. Podiatry following.

## 2024-03-28 NOTE — PROGRESS NOTE ADULT - SUBJECTIVE AND OBJECTIVE BOX
Evaluated at bedside, stable, complains of pain in R foot (osteomyelitis).   Spoke with pt's daughter over the phone who reviewed pt's labs from his op Nephrologist office sCr baseline 3.8-4.2 (since 6/2023).   Pts is currently at baseline.       Allergies:  vancomycin (Unknown)    REVIEW OF SYSTEMS:  All other review of systems is negative unless indicated above.    PHYSICAL EXAM:  Neck: supple; no JVD  Respiratory: CTA B/L; on 2L NC  Cardiac: RRR; no M/R/G  Gastrointestinal: abdomen soft, NT/ND; no rebound or guarding  Extremities: no peripheral edema, R foot with ulcer in the region of fifth metatarsal.   Neurologic: AAOx3; no focal deficits      RADIOLOGY & ADDITIONAL STUDIES: Reviewed.     VITALS:  T(F): 98.2 (03-28-24 @ 10:51), Max: 98.2 (03-28-24 @ 05:24)  HR: 69 (03-28-24 @ 10:51)  BP: 114/55 (03-28-24 @ 10:51)  RR: 18 (03-28-24 @ 10:51)  SpO2: 100% (03-28-24 @ 10:51)  Wt(kg): --    03-26 @ 07:01  -  03-27 @ 07:00  --------------------------------------------------------  IN: 0 mL / OUT: 250 mL / NET: -250 mL    03-27 @ 07:01  -  03-28 @ 07:00  --------------------------------------------------------  IN: 0 mL / OUT: 650 mL / NET: -650 mL          LABS:  03-28    134<L>  |  108  |  72<H>  ----------------------------<  109<H>  3.6   |  14<L>  |  4.04<H>    Ca    8.8      28 Mar 2024 07:12  Phos  5.1     03-28  Mg     2.0     03-28    TPro  6.0  /  Alb  2.9<L>  /  TBili  0.2  /  DBili      /  AST  17  /  ALT  13  /  AlkPhos  64  03-28                          8.0    8.57  )-----------( 235      ( 28 Mar 2024 07:12 )             25.5                        Evaluated at bedside, stable, complains of pain in R foot (osteomyelitis).   Spoke with pt's daughter over the phone who reviewed pt's labs from his op Nephrologist office sCr baseline 3.8-4.2 (since 6/2023).   Pts is currently at baseline.       Allergies:  vancomycin (Unknown)    REVIEW OF SYSTEMS:  All other review of systems is negative unless indicated above.    PHYSICAL EXAM:  Neck: supple; no JVD  Respiratory: CTA B/L; on 2L NC  Cardiac: RRR; no M/R/G  Gastrointestinal: abdomen soft, NT/ND; no rebound or guarding  Extremities: no peripheral edema, R foot with ulcer in the region of fifth metatarsal.   Neurologic: AAOx3; no focal deficits    VITALS:  T(F): 98.2 (03-28-24 @ 10:51), Max: 98.2 (03-28-24 @ 05:24)  HR: 69 (03-28-24 @ 10:51)  BP: 114/55 (03-28-24 @ 10:51)  RR: 18 (03-28-24 @ 10:51)  SpO2: 100% (03-28-24 @ 10:51)  Wt(kg): --    03-26 @ 07:01  -  03-27 @ 07:00  --------------------------------------------------------  IN: 0 mL / OUT: 250 mL / NET: -250 mL    03-27 @ 07:01  -  03-28 @ 07:00  --------------------------------------------------------  IN: 0 mL / OUT: 650 mL / NET: -650 mL    03-28 @ 07:01  -  03-28 @ 21:18  --------------------------------------------------------  IN: 0 mL / OUT: 750 mL / NET: -750 mL          LABS:  03-28    134<L>  |  108  |  72<H>  ----------------------------<  109<H>  3.6   |  14<L>  |  4.04<H>    Ca    8.8      28 Mar 2024 07:12  Phos  5.1     03-28  Mg     2.0     03-28    TPro  6.0  /  Alb  2.9<L>  /  TBili  0.2  /  DBili      /  AST  17  /  ALT  13  /  AlkPhos  64  03-28                          8.0    8.57  )-----------( 235      ( 28 Mar 2024 07:12 )             25.5         acetaminophen     Tablet .. 975 milliGRAM(s) Oral every 8 hours  apixaban 5 milliGRAM(s) Oral two times a day  atorvastatin 80 milliGRAM(s) Oral at bedtime  clopidogrel Tablet 75 milliGRAM(s) Oral daily  dextrose 5%. 1000 milliLiter(s) IV Continuous <Continuous>  dextrose 5%. 1000 milliLiter(s) IV Continuous <Continuous>  dextrose 50% Injectable 25 Gram(s) IV Push once  dextrose 50% Injectable 12.5 Gram(s) IV Push once  dextrose 50% Injectable 25 Gram(s) IV Push once  dextrose Oral Gel 15 Gram(s) Oral once PRN  glucagon  Injectable 1 milliGRAM(s) IntraMuscular once  influenza  Vaccine (HIGH DOSE) 0.7 milliLiter(s) IntraMuscular once  insulin lispro (ADMELOG) corrective regimen sliding scale   SubCutaneous three times a day before meals  insulin lispro (ADMELOG) corrective regimen sliding scale   SubCutaneous at bedtime  linezolid    Tablet 600 milliGRAM(s) Oral every 12 hours  piperacillin/tazobactam IVPB.. 3.375 Gram(s) IV Intermittent every 12 hours  sodium bicarbonate 650 milliGRAM(s) Oral every 12 hours

## 2024-03-28 NOTE — DISCHARGE NOTE PROVIDER - HOSPITAL COURSE
#Discharge: do not delete    Patient is a 69yo M wiht PMH of DM, diabetic wounds s/p left toe amputations, COPD (on home 2L, on BIPAP overnight), CKD (unknown baseline Cr), and colon cancer (w/ colostomy bag) who presents with right toe pain/wound for the past 6-7 weeks, admitted for osteomyelitis of R foot. S/P right partial 5th ray amputation on 3/29/24. Now s/p PICC.    Hospital course (by problem):     #Foot osteomyelitis.   Patient c/o right foot pain x 6-7 weeks. Hx of diabetic foot infection with prior left toe amputation. On doxycycline outpatient x 2 weeks without improvement. Follows with vascular at [facility] for PAD. Podiatry consulted, and on podiatry exam, erythema around R lateral foot wound extending proximally to met base and dorsally to 5th metatarsal; Right lateral 5th metatarsal head with 3 x 2 cm wound with overlying eschar, neg PTB or fluctuance  Labs significant for elevated ESR/CRP, mild leukocytosis. Given zosyn 3.375g x 1 in the ED. Concern for prior allergy to vancomycin vs Daptomycin.   - XR R foot: No convincing plain radiographic evidence for acute osteomyelitis.  - CT R foot: No CT evidence of osteomyelitis. However, MRI would be a more sensitive test to evaluate for osteomyelitis.  - MRI of R foot (3/26): Osteomyelitis of the fifth metatarsal head and fifth digit proximal phalanx with adjacent soft tissue ulceration and phlegmon.  - BCx + gram + cocci, Staph Epi.   - Surveillance blood cultures- NGTD  - S/P partial right toe amputation 3/29. Surgical margins appear to be +Pseudomonas and +Morganella   Plan:  - ID consulted, f/u recs   - f/u podiatry recs;  - Continue Zosyn 4.5 grams IV q12 x 6 weeks (3/29/24 - 5/10/24)  - Needs weekly CBC, CMP, ESR, CRP.  Fax to: [phone]  - Follow up with Dr. Archer as outpatient.    #Anemia.   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 No s/s of active bleeding. Per old lab review, Hgb was 9 in 1/2024. T iron 27, . s/p transfusion, most recent on 4/1 6.8>8.1 post-transfusion.- S/P Epo 10K units 4/1.   - Cont EPO weekly  - Holding IV Fe supplement iso active Osteomyelitis.   - maintain active T&S  - transfuse for hgb < 7.    #Stage 5 chronic kidney disease not on chronic dialysis.   Patient with hx of CKD, baseline Cr 3.9- 4 per son. On presentation, BUN/Cr 81/4.20 with metabolic acidosis likely iso acute renal failure, HCO3 16. Cystatin C 4.3. S/P 500 cc LR bolus. Cr now downtrending.  cc. Renal consulted, following pt.  Home med: sodium bicarb 650mg bid.  - avoid nephrotoxic agents.    #Metabolic acidosis.   Bicarb 16 on admission-> 14. GAP 13 -> 16. Renal consulted, following pt.  - C/w 1300 mg Na bicarb BID     #Type 2 diabetes mellitus.   Patient with hx of diabetes, reportedly last A1c 5. Self-titrates home insulin doses based on blood sugar, but unable to name how much he takes on average.   - A1c 5 (3/26)  - monitor FSG  - moderate ISS.    #Chronic atrial fibrillation.   Pt with h/o of paroxysmal AFib per Cardiologist. Currently rate controlled.   Home meds: Eliquis 5 bid, coreg 6.25 mg bid   - c/w eliquis and coreg.    #H/O aortic valve replacement.   Per pt, history of aortic valve replacement ~1-2 years ago.   Unclear about further cardiac history. Pt states he takes Eliquis and Plavix at home. States he took Plavix yesterday (3/25) and stopped plavix 3 days ago.  Upon more collatoral from pt's Cardiologist, Pt has history of MI 1-2 years ago Is on the Eliquis for paraoxysmal afib. Cardiologist Dr. Adams.    - Plan as above.    #Peripheral arterial disease.   History of right lower extremity angioplasty in 1/2024. Per collateral  from Pt's Vascular Surgeon, Pt had a balloon Angioplasty in Proximal AT abd below the knee popliteal as started on Plavix since with 1 conventional and 1 drug eluting stent.   - c/w home med Plavix 75mg QD    #COPD without exacerbation.   Hx of COPD, on 2L O2 at home.   No s/s of exacerbation on this admission, satting 100% on home O2. Uses CPAP at nighttime.   - c/w home O2  - c/w CPAP at nighttime.    #History of colon cancer.   Hx of colon cancer, now with colostomy.   - no active interventions.          Patient was discharged to: Home    New medications:   Changes to old medications:  Medications that were stopped:    Items to follow up as outpatient:Labs to be followed outpatient:     Exam to be followed outpatient:     Physical exam at the time of discharge:                 #Discharge: do not delete  Patient is a 67yo M wiht PMH of DM, diabetic wounds s/p left toe amputations, COPD (on home 2L, on BIPAP overnight), CKD (unknown baseline Cr), and colon cancer (w/ colostomy bag) who presents with right toe pain/wound for the past 6-7 weeks, admitted for osteomyelitis of R foot. S/P right partial 5th ray amputation on 3/29/24. Now s/p PICC.    Hospital course (by problem):     #Foot osteomyelitis.   Patient c/o right foot pain x 6-7 weeks. Hx of diabetic foot infection with prior left toe amputation. On doxycycline outpatient x 2 weeks without improvement. Follows with vascular at [facility] for PAD. Podiatry consulted, and on podiatry exam, erythema around R lateral foot wound extending proximally to met base and dorsally to 5th metatarsal; Right lateral 5th metatarsal head with 3 x 2 cm wound with overlying eschar, neg PTB or fluctuance  Labs significant for elevated ESR/CRP, mild leukocytosis. Given zosyn 3.375g x 1 in the ED. Concern for prior allergy to vancomycin vs Daptomycin.   - XR R foot: No convincing plain radiographic evidence for acute osteomyelitis.  - CT R foot: No CT evidence of osteomyelitis. However, MRI would be a more sensitive test to evaluate for osteomyelitis.  - MRI of R foot (3/26): Osteomyelitis of the fifth metatarsal head and fifth digit proximal phalanx with adjacent soft tissue ulceration and phlegmon.  - BCx + gram + cocci, Staph Epi.   - Surveillance blood cultures- NGTD  - S/P partial right toe amputation 3/29. Surgical margins appear to be +Pseudomonas and +Morganella   Plan:  - ID consulted, f/u recs   - f/u podiatry recs;  - Continue Zosyn 4.5 grams IV q12 x 6 weeks (3/29/24 - 5/10/24)  - Needs weekly CBC, CMP, ESR, CRP.  Fax to: [phone]  - Follow up with Dr. Archer as outpatient.    #Anemia.   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 No s/s of active bleeding. Per old lab review, Hgb was 9 in 1/2024. T iron 27, . s/p transfusion, most recent on 4/1 6.8>8.1 post-transfusion.- S/P Epo 10K units 4/1.   - Cont EPO weekly  - Holding IV Fe supplement iso active Osteomyelitis.   - maintain active T&S  - transfuse for hgb < 7.    #Stage 5 chronic kidney disease not on chronic dialysis.   Patient with hx of CKD, baseline Cr 3.9- 4 per son. On presentation, BUN/Cr 81/4.20 with metabolic acidosis likely iso acute renal failure, HCO3 16. Cystatin C 4.3. S/P 500 cc LR bolus. Cr now downtrending.  cc. Renal consulted, following pt.  Home med: sodium bicarb 650mg bid.  - avoid nephrotoxic agents.    #Metabolic acidosis.   Bicarb 16 on admission-> 14. GAP 13 -> 16. Renal consulted, following pt.  - C/w 1300 mg Na bicarb BID     #Type 2 diabetes mellitus.   Patient with hx of diabetes, reportedly last A1c 5. Self-titrates home insulin doses based on blood sugar, but unable to name how much he takes on average.   - A1c 5 (3/26)  - monitor FSG  - moderate ISS.    #Chronic atrial fibrillation.   Pt with h/o of paroxysmal AFib per Cardiologist. Currently rate controlled.   Home meds: Eliquis 5 bid, coreg 6.25 mg bid   - c/w eliquis and coreg.    #H/O aortic valve replacement.   Per pt, history of aortic valve replacement ~1-2 years ago.   Unclear about further cardiac history. Pt states he takes Eliquis and Plavix at home. States he took Plavix yesterday (3/25) and stopped plavix 3 days ago.  Upon more collatoral from pt's Cardiologist, Pt has history of MI 1-2 years ago Is on the Eliquis for paraoxysmal afib. Cardiologist Dr. Adams.    - Plan as above.    #Peripheral arterial disease.   History of right lower extremity angioplasty in 1/2024. Per collateral  from Pt's Vascular Surgeon, Pt had a balloon Angioplasty in Proximal AT abd below the knee popliteal as started on Plavix since with 1 conventional and 1 drug eluting stent.   - c/w home med Plavix 75mg QD    #COPD without exacerbation.   Hx of COPD, on 2L O2 at home.   No s/s of exacerbation on this admission, satting 100% on home O2. Uses CPAP at nighttime.   - c/w home O2  - c/w CPAP at nighttime.    #History of colon cancer.   Hx of colon cancer, now with colostomy.   - no active interventions.    Patient was discharged to: Home    New medications: Zosyn  Changes to old medications: None  Medications that were stopped: None    Items to follow up as outpatient: Please f/u with ID Dr. Archer.  Labs to be followed outpatient: CBC, CMP, ESR, CRP.   Exam to be followed outpatient: As above.    Physical exam at the time of discharge:  Constitutional: NAD  Head: NC/AT  Eyes: PERRL, EOMI, anicteric sclera  ENT: no nasal discharge; MMM  Neck: supple; no JVD or thyromegaly  Respiratory: CTA B/L; no W/R/R, no retractions; on 2L NC  Cardiac: +S1/S2; RRR; no M/R/G  Gastrointestinal: abdomen soft, +ostomy bag draining billious stool    Extremities: WWP, no clubbing or cyanosis; no peripheral edema.   -per podiatry exam: Erythema around right lateral foot wound extending proximally to met base and dorsally to 5th metatarsal. S/P right toe amputation on 3/29. S/p 2-5 ray amputations on Left  Vascular: 2+ radial, non-palpable DP/PT pulses B/L (pulses dopplerable)         #Discharge: do not delete  Patient is a 69yo M wi PMH of DM, diabetic wounds s/p left toe amputations, COPD (on home 2L, on BIPAP overnight), CKD (unknown baseline Cr), and colon cancer (w/ colostomy bag) who presents with right toe pain/wound for the past 6-7 weeks, admitted for osteomyelitis of R foot. S/P right partial 5th ray amputation on 3/29/24. Now s/p PICC.    Hospital course (by problem):     # Right 5th Toe Osteomyelitis.   # Diabetic Foot Ulcer   Patient c/o right foot pain x 6-7 weeks. Hx of diabetic foot infection with prior left toe amputation. On doxycycline outpatient x 2 weeks without improvement. Follows with vascular at [facility] for PAD. Podiatry consulted, and on podiatry exam, erythema around R lateral foot wound extending proximally to met base and dorsally to 5th metatarsal; Right lateral 5th metatarsal head with 3 x 2 cm wound with overlying eschar, neg PTB or fluctuance  - MRI of R foot (3/26): Osteomyelitis of the fifth metatarsal head and fifth digit proximal phalanx with adjacent soft tissue ulceration and phlegmon.  - BCx + gram + cocci, Staph Epi.  - Surveillance blood cultures- NGTD  - S/P partial right toe amputation 3/29. Surgical margins appear to be +Pseudomonas and +Morganella     - Continue Zosyn 4.5 grams IV q12 x 6 weeks (3/29/24 - 5/10/24)  - Needs weekly CBC, CMP, ESR, CRP.  Fax to: [phone]  - Follow up with Dr. Archer as outpatient.    #Anemia of Chronic Disease  # Iron Deficiency Anemia   -  No s/s of active bleeding. Per old lab review, Hgb was 9 in 1/2024. T iron 27, . s/p transfusion, most recent on 4/1 6.8>8.1 post-transfusion.- S/P Epo 10K units 4/1.   - Cont EPO weekly  - Holding IV Fe supplement iso active Osteomyelitis.     #Stage 5 chronic kidney disease not on chronic dialysis.   Patient with hx of CKD, baseline Cr 3.9- 4 per son. On presentation, BUN/Cr 81/4.20 with metabolic acidosis likely iso acute renal failure, HCO3 16. Cystatin C 4.3. S/P 500 cc LR bolus. Cr now downtrending.  cc. Renal consulted, following pt.    #Metabolic acidosis.   Bicarb 16 on admission-> 14. GAP 13 -> 16. Renal consulted, following pt.  - C/w 1300 mg Na bicarb BID     #Type 2 diabetes mellitus.   Patient with hx of diabetes, reportedly last A1c 5. Self-titrates home insulin doses based on blood sugar, but unable to name how much he takes on average.   - A1c 5 (3/26)    #Chronic atrial fibrillation.   Pt with h/o of paroxysmal AFib per Cardiologist. Currently rate controlled.   Home meds: Eliquis 5 bid, coreg 6.25 mg bid   - c/w eliquis and coreg.    #H/O aortic valve replacement.   Per pt, history of aortic valve replacement ~1-2 years ago.   Unclear about further cardiac history. Pt states he takes Eliquis and Plavix at home. States he took Plavix yesterday (3/25) and stopped plavix 3 days ago.  Upon more collatoral from pt's Cardiologist, Pt has history of MI 1-2 years ago Is on the Eliquis for paraoxysmal afib. Cardiologist Dr. Adams.    - Plan as above.    #Peripheral arterial disease.   History of right lower extremity angioplasty in 1/2024. Per collateral  from Pt's Vascular Surgeon, Pt had a balloon Angioplasty in Proximal AT abd below the knee popliteal as started on Plavix since with 1 conventional and 1 drug eluting stent.   - c/w home med Plavix 75mg QD    #COPD without exacerbation.   Hx of COPD, on 2L O2 at home.   No s/s of exacerbation on this admission, satting 100% on home O2. Uses CPAP at nighttime.   - c/w home O2  - c/w CPAP at nighttime.    #History of colon cancer.   Hx of colon cancer, now with colostomy.    # ZAYDA     # PAD     Patient was discharged to: Home    New medications: Zosyn  Changes to old medications:  Bicarbonate dose increased to 1300mg po BID   Medications that were stopped: None    Items to follow up as outpatient: Please f/u with ID Dr. Archer.  Labs to be followed outpatient: CBC, CMP, ESR, CRP.   Exam to be followed outpatient: As above.    Physical exam at the time of discharge:  Constitutional: NAD  Head: NC/AT  Eyes: PERRL, EOMI, anicteric sclera  ENT: no nasal discharge; MMM  Neck: supple; no JVD or thyromegaly  Respiratory: CTA B/L; no W/R/R, no retractions; on 2L NC  Cardiac: +S1/S2; RRR; no M/R/G  Gastrointestinal: abdomen soft, +ostomy bag draining billious stool    Extremities: WWP, no clubbing or cyanosis; no peripheral edema.   -per podiatry exam: Erythema around right lateral foot wound extending proximally to met base and dorsally to 5th metatarsal. S/P right toe amputation on 3/29. S/p 2-5 ray amputations on Left  Vascular: 2+ radial, non-palpable DP/PT pulses B/L (pulses dopplerable)

## 2024-03-28 NOTE — PROGRESS NOTE ADULT - ASSESSMENT
IMPRESSION:  Concern for diabetic foot infection including osteomyelitis     Recommend:  1.  Continue Zosyn 3.375 grams IV q12  2.  Continue Linezolid 600 mg IV q12  3.  Duration and route of antibiotics will be determined by surgical plan    ID team 2 will follow

## 2024-03-28 NOTE — CONSULT NOTE ADULT - ASSESSMENT
I M    68 y o M wiht PMH of DM, diabetic wounds s/p left toe amputations, COPD (on home 2L, on BIPAP overnight), CKD (unknown baseline Cr), and colon cancer (w/ colostomy bag) who presents with right toe pain/wound for the past 6-7 weeks, admitted for osteomyelitis of R foot.     Problem/Plan - 1:  ·  Problem: Foot osteomyelitis.   ·  Plan: #Osteomyelitis R foot  Patient c/o right foot pain x 6-7 weeks.  Hx of diabetic foot infection with prior left toe amputation. On doxycycline outpatient x 2 weeks without improvement. Follows with vascular at [facility] for PAD.  On podiatry exam, erythema around R lateral foot wound extending proximally to met base and dorsally to 5th metatarsal; Right lateral 5th metatarsal head with 3 x 2 cm wound with overlying eschar, neg PTB or fluctuance  Labs significant for elevated ESR/CRP, mild leukocytosis.  Podiatry consulted. Given zosyn 3.375g x 1 in the ED. Concern for prior allergy to vancomycin vs Daptomycin?   - XR R foot: No convincing plain radiographic evidence for acute osteomyelitis.  - XR L foot : No tracking gas collections or gross radiographic evidence for osteomyelitis however if concern persists MRI suggested to further assess.  - CT R foot: No CT evidence of osteomyelitis. However, MRI would be a more sensitive test to evaluate for osteomyelitis.  - hold off on vancomycin or dapto given unclear allergy history and patient stable   - MRI of R foot (3/26): Osteomyelitis of the fifth metatarsal head and fifth digit proximal phalanx with adjacent soft tissue ulceration and phlegmon.  - BCx + gram + cocci, Staph Epi   - ID consulted, f/u recs   - c/w zosyn w/ pseudomonal dosing and Linezolid q12   - f/u Surveillance blood cultures  - f/u podiatry recs.    Problem/Plan - 2:  ·  Problem: Stage 5 chronic kidney disease not on chronic dialysis.   ·  Plan: Patient with hx of CKD, baseline Cr 3.9- 4 per son.   On presentation, BUN/Cr 81/4.20 with metabolic acidosis likely iso acute renal failure, HCO3 16.   Cystatin C   S/P 500 cc LR bolus   Home med: sodium bicarb 650mg bid.    - F/u Renal recs  - f/u urine lytes, cystatin C  - avoid nephrotoxic agents  - monitor urine output  - continue sodium bicarb 650 bid.    Problem/Plan - 3:  ·  Problem: Metabolic acidosis.   ·  Plan: Bicarb 16 on admission-> 14. GAP 13 -> 16    - CTM  - Renal consulted, f/u recs.    Problem/Plan - 4:  ·  Problem: Anemia.   ·  Plan: Likely AOCD iso chronic kidney disease. No s/s of active bleeding. Per old lab review, Hgb was 9 in 1/2024.   Hgb currently 7.5     - F/u iron studies  - maintain active T&S  - transfuse for hgb < 7.    Problem/Plan - 5:  ·  Problem: Type 2 diabetes mellitus.   ·  Plan: Patient with hx of diabetes, reportedly last A1c 5. Self-titrates home insulin doses based on blood sugar, but unable to name how much he takes on average.   - A1c 5 (3/26)  - monitor FSG  - moderate ISS.    Problem/Plan - 6:  ·  Problem: Chronic atrial fibrillation.   ·  Plan: Pt with h/o of paroxysmal AFib per Cardiologist. Currently rate controlled.   Home meds: Eliquis 5 bid, coreg 6.25 mg bid     - c/w eliquis  - hold coreg for now.    Problem/Plan - 7:  ·  Problem: H/O aortic valve replacement.   ·  Plan: Per pt, history of aortic valve replacement ~1-2 years ago.   Unclear about further cardiac history. Pt states he takes Eliquis and Plavix at home. States he took Plavix yesterday (3/25) and stopped plavix 3 days ago.  Upon more collatoral from pt's Cardiologist, Pt has history of MI 1-2 years ago Is on the Eliquis for paraoxysmal afib.   Cardiologist Dr. Adams,    - Plan as above.    Problem/Plan - 8:  ·  Problem: Peripheral arterial disease.   ·  Plan: History of right lower extremity angioplasty in 1/2024.   Per collateral  from Pt's Vascular Surgeon, Pt had a balloon Angioplasty in Proximal AT abd below the knee popliteal   as started on Plavix since with 1 conventional and 1 drug eluting stent.   Home meds: Plavix 75 mg qd    - c/w home meds.    Problem/Plan - 9:  ·  Problem: COPD without exacerbation.   ·  Plan: Hx of COPD, on 2L O2 at home.   No s/s of exacerbation on this admission, satting 100% on home O2. Uses CPAP at nighttime.   - c/w home O2  - c/w CPAP at nighttime.    Problem/Plan - 10:  ·  Problem: History of colon cancer.   ·  Plan; Hx of colon cancer, now with colostomy.   - no active interventions.    Problem/Plan - 11:  ·  Problem: Prophylactic measure.   ·  Plan: F: s/p 500 cc LR  E: Replete as needed, caution due to CKD   N: consistent carb  D: Plavix and Eliquis   Dispo: [facility].

## 2024-03-28 NOTE — CONSULT NOTE ADULT - SUBJECTIVE AND OBJECTIVE BOX
Patient is a 68y old  Male who presents with a chief complaint of osteomyelitis of foot (28 Mar 2024 11:38)      HPI:  HPI: Patient is a 67yo M with PMH of Chronic fib, DM, diabetic wounds s/p left toe amputations, COPD (on home 2L, on BIPAP overnight), Stage 5 CKD (baseline Cr 3.9), ?CAD, PAD, and colon cancer (w/ colostomy bag) who presents with right toe pain/wound for the past 6-7 weeks. Reportedly patient had outpatient XR of the foot 3 days ago which were concerning for "bone infection". States he was initially seen by his vascular surgeon, who "ballooned the veins" in his leg to improve blood flow, no stents placed at the time. Has been on doxycycline 100mg bid outpatient for the past 2 weeks without improvement. A few days ago, he developed worsening pain on his right foot with erythema and blistering. Describes intense pain and inability to bear weight on the right foot which brought him in to the ED. Of note, patient reports an allergy to IV antibiotic that causes angioedema and some difficulty breathing, does not recall whether it was vancomycin or daptomycin. Reports he recently switched nephrologists and was started on sodium bicarbonate; per son at bedside, baseline Cr is 4.02. Makes urine. Additionally states he "needs stents " for his heart, but does not currently have any. Uses NC 2L at baseline, CPAP at night. No other complaints at this time.     In the ED:  Vital Signs: T 98.4 F, HR 87, /57, RR 17, SpO2 100% on 2L NC  Labs: significant for ESR 85, CRP 43.3, WBC 10.66, Hgb 7.8, HCO3 16, BUN/Cr 81/4.20, lactate 0.5  XR R foot: pending read; wet read c/f osteomyelitis  CT R foot: pending read  EKG: pending  Interventions: zosyn 3.375g IV x 1, not given vanc due to concern for allergy  Consults: podiatry   (26 Mar 2024 04:45)    PAST MEDICAL & SURGICAL HISTORY:  COPD with hypoxia      Chronic kidney disease (CKD)      CAD (coronary artery disease)      Type 2 diabetes mellitus      Diabetic foot ulcers      PAD (peripheral artery disease)      Status post amputation of toe        MEDICATIONS  (STANDING):  acetaminophen     Tablet .. 975 milliGRAM(s) Oral every 8 hours  apixaban 5 milliGRAM(s) Oral two times a day  atorvastatin 80 milliGRAM(s) Oral at bedtime  clopidogrel Tablet 75 milliGRAM(s) Oral daily  dextrose 5%. 1000 milliLiter(s) (50 mL/Hr) IV Continuous <Continuous>  dextrose 5%. 1000 milliLiter(s) (100 mL/Hr) IV Continuous <Continuous>  dextrose 50% Injectable 25 Gram(s) IV Push once  dextrose 50% Injectable 25 Gram(s) IV Push once  dextrose 50% Injectable 12.5 Gram(s) IV Push once  glucagon  Injectable 1 milliGRAM(s) IntraMuscular once  influenza  Vaccine (HIGH DOSE) 0.7 milliLiter(s) IntraMuscular once  insulin lispro (ADMELOG) corrective regimen sliding scale   SubCutaneous three times a day before meals  insulin lispro (ADMELOG) corrective regimen sliding scale   SubCutaneous at bedtime  linezolid    Tablet 600 milliGRAM(s) Oral every 12 hours  piperacillin/tazobactam IVPB.. 3.375 Gram(s) IV Intermittent every 12 hours  sodium bicarbonate 650 milliGRAM(s) Oral every 12 hours    MEDICATIONS  (PRN):  dextrose Oral Gel 15 Gram(s) Oral once PRN Blood Glucose LESS THAN 70 milliGRAM(s)/deciliter          Home Living Status :  lives with his spouse in an elevator accessible apartment building          -  Home Services :  none       Baseline Functional Level Prior to Admission :             - ADL's/ IADL's :  dependent on wife         - Ambulatory status prior to admission :   walked with a walker          FAMILY HISTORY:  FH: diabetes mellitus        CBC Full  -  ( 28 Mar 2024 07:12 )  WBC Count : 8.57 K/uL  RBC Count : 2.83 M/uL  Hemoglobin : 8.0 g/dL  Hematocrit : 25.5 %  Platelet Count - Automated : 235 K/uL  Mean Cell Volume : 90.1 fl  Mean Cell Hemoglobin : 28.3 pg  Mean Cell Hemoglobin Concentration : 31.4 gm/dL  Auto Neutrophil # : 5.05 K/uL  Auto Lymphocyte # : 2.42 K/uL  Auto Monocyte # : 0.84 K/uL  Auto Eosinophil # : 0.19 K/uL  Auto Basophil # : 0.03 K/uL  Auto Neutrophil % : 58.9 %  Auto Lymphocyte % : 28.2 %  Auto Monocyte % : 9.8 %  Auto Eosinophil % : 2.2 %  Auto Basophil % : 0.4 %      03-28    134<L>  |  108  |  72<H>  ----------------------------<  109<H>  3.6   |  14<L>  |  4.04<H>    Ca    8.8      28 Mar 2024 07:12  Phos  5.1     03-28  Mg     2.0     03-28    TPro  6.0  /  Alb  2.9<L>  /  TBili  0.2  /  DBili  x   /  AST  17  /  ALT  13  /  AlkPhos  64  03-28      Urinalysis Basic - ( 28 Mar 2024 07:12 )    Color: x / Appearance: x / SG: x / pH: x  Gluc: 109 mg/dL / Ketone: x  / Bili: x / Urobili: x   Blood: x / Protein: x / Nitrite: x   Leuk Esterase: x / RBC: x / WBC x   Sq Epi: x / Non Sq Epi: x / Bacteria: x        Radiology :     < from: MR Foot No Cont, Right (03.26.24 @ 16:17) >  ACC: [id number] EXAM:  MR FOOT RT   ORDERED BY: GABBIE DURAN     PROCEDURE DATE:  03/26/2024          INTERPRETATION:  EXAMINATION: MR FOOT RIGHT    CLINICAL INDICATION:Evaluate for osteomyelitis.    COMPARISON: CT foot dated 3/26/2024 and foot radiographs dated 3/26/2024    TECHNIQUE: Multiplanar, multi-sequence MRI of the right forefoot/midfoot   was performed without intravenous contrast.    INTERPRETATION:    There is a plantar/lateral foot ulcer underlying the fifth ray with   surrounding soft tissue edema and underlying T1 hypointense patchy signal   abnormality with corresponding STIR hyperintensity within the fifth   metatarsal head and fifth digit proximal phalanx consistent with   osteomyelitis. There is dorsal foot soft tissue edema. There is edema and   atrophy of the intrinsic muscles of the foot, which may be seen in   neuropathy. There is cystic degenerative change at the base of the second   digit proximal phalanx which appears degenerative.    IMPRESSION:  Osteomyelitisof the fifth metatarsal head and fifth digit proximal   phalanx with adjacent soft tissue ulceration and phlegmon.            Review of Systems : per HPI         Vital Signs Last 24 Hrs  T(C): 36.8 (28 Mar 2024 10:51), Max: 36.8 (28 Mar 2024 05:24)  T(F): 98.2 (28 Mar 2024 10:51), Max: 98.2 (28 Mar 2024 05:24)  HR: 69 (28 Mar 2024 10:51) (66 - 77)  BP: 114/55 (28 Mar 2024 10:51) (112/50 - 117/52)  BP(mean): 71 (28 Mar 2024 05:24) (71 - 71)  RR: 18 (28 Mar 2024 10:51) (18 - 19)  SpO2: 100% (28 Mar 2024 10:51) (100% - 100%)    Parameters below as of 28 Mar 2024 10:51  Patient On (Oxygen Delivery Method): nasal cannula  O2 Flow (L/min): 2          Physical Exam:  69 yo man lying comfortably in semi Davis's position , awake , alert , no acute complaints     Head: normocephalic , atraumatic    Eyes: PERRLA , EOMI , no nystagmus , sclera anicteric    ENT / FACE: neg nasal discharge , uvula midline , no oropharyngeal erythema / exudate    Neck: supple , negative JVD , negative carotid bruits , no thyromegaly    Chest: CTA bilaterally , neg wheeze / rhonchi / rales / crackles / egophany    Cardiovascular: regular rate and rhythm , neg murmurs / rubs / gallops    Abdomen: soft , non distended , non tender to palpation in all 4 quadrants ,  normal bowel sounds     Extremities:  R foot dressing    Neurologic Exam:     Alert and oriented        Motor Exam:        > 4/5 x 4 extremities       Sensation:        dec distal LE's    DTR:           biceps/brachioradialis: equal                            patella/ankle: equal      Gait:  not tested           PM&R Impression: admitted for osteomyelitis of R foot    - heel WBAT with assistive devices R LE           Recommendations / Plan:       1) Physical / Occupational therapy focusing on therapeutic exercises , equipment evaluation , bed mobility/transfer out of bed evaluation , progressive ambulation with assistive devices prn .    2) Current disposition plan recommendation:    pending functional progress

## 2024-03-28 NOTE — DISCHARGE NOTE PROVIDER - NSDCFUSCHEDAPPT_GEN_ALL_CORE_FT
Nilson Archer  [facility]  INFDISEASE [address]  Scheduled Appointment: 04/17/2024

## 2024-03-28 NOTE — DISCHARGE NOTE PROVIDER - NSDCFUADDAPPT_GEN_ALL_CORE_FT
Please follow up with Infectious Disease Dr. Archer on 4/17 at 10:20AM.     Address: [address] [place] [address], [address] btw [address] and [facility]  Phone Number: [phone]

## 2024-03-28 NOTE — PROGRESS NOTE ADULT - SUBJECTIVE AND OBJECTIVE BOX
Patient is a 68y old  Male who presents with a chief complaint of osteomyelitis of foot (27 Mar 2024 12:28)      INTERVAL HPI/ OVERNIGHT EVENTS  Pt evaluated bedside Dressing c/d/i.       LABS                        8.0    8.57  )-----------( 235      ( 28 Mar 2024 07:12 )             25.5     03-28    134<L>  |  108  |  72<H>  ----------------------------<  109<H>  3.6   |  14<L>  |  4.04<H>    Ca    8.8      28 Mar 2024 07:12  Phos  5.1     03-28  Mg     2.0     03-28    TPro  6.0  /  Alb  2.9<L>  /  TBili  0.2  /  DBili  x   /  AST  17  /  ALT  13  /  AlkPhos  64  03-28        ICU Vital Signs Last 24 Hrs  T(C): 36.8 (28 Mar 2024 05:24), Max: 36.8 (28 Mar 2024 05:24)  T(F): 98.2 (28 Mar 2024 05:24), Max: 98.2 (28 Mar 2024 05:24)  HR: 66 (28 Mar 2024 05:24) (66 - 77)  BP: 112/50 (28 Mar 2024 05:24) (112/50 - 117/52)  BP(mean): 71 (28 Mar 2024 05:24) (71 - 71)  ABP: --  ABP(mean): --  RR: 18 (28 Mar 2024 05:24) (18 - 19)  SpO2: 100% (28 Mar 2024 05:24) (100% - 100%)    O2 Parameters below as of 28 Mar 2024 05:24  Patient On (Oxygen Delivery Method): room air            RADIOLOGY  < from: MR Foot No Cont, Right (03.26.24 @ 16:17) >  IMPRESSION:  Osteomyelitisof the fifth metatarsal head and fifth digit proximal   phalanx with adjacent soft tissue ulceration and phlegmon.    --- End of Report ---    < end of copied text >    < from: CT Foot No Cont, Right (03.26.24 @ 02:25) >  IMPRESSION:    Nonspecific subcu< from: Xray Foot AP + Lateral, Right (03.26.24 @ 02:24) >    IMPRESSION:  No convincing plain radiographic evidence for acute osteomyelitis.    --- End of Report ---    < end of copied text >  taneous edema about the foot, which may be related to   lower extremity edema or cellulitis. No soft tissue gas seen.    No CT evidence of osteomyelitis. However, MRI would be a more sensitive   test to evaluate for osteomyelitis.    Curvilinear lucency within the base of the second proximal phalanx, which   may be related to old trauma or be related to an acute nondisplaced   intra-articular fracture. Correlation with point tenderness is   recommended.    --- End of Report ---    < end of copied text >      MICROBIOLOGY    PHYSICAL EXAM  Lower Extremity Focused  Vasc: PT/DP non-palpable b/l -bipasic waveforms heard at b/l DP and monophasic waveforms heard at b/l PT; Erythema around right lateral foot wound extending proximally to met base and dorsally to 5th metatarsal  Derm: Right lateral 5th metatrasl head with 3 x 2 cm wound with overlying eschar, (-)PTB, (+)malodor, (-)fluctuance, (-)tracking, (-)tunneling  Left distal hallux with hyperkeratotic lesion debrided to reveal 0.7 x 0.5 cm superficial wound with granular base, does not track deep  Neuro: protective sensation in tact  MSK: s/p 2-5 ray amputations on Left

## 2024-03-28 NOTE — PROGRESS NOTE ADULT - PROBLEM SELECTOR PLAN 1
#Osteomyelitis R foot  Patient c/o right foot pain x 6-7 weeks.  Hx of diabetic foot infection with prior left toe amputation. On doxycycline outpatient x 2 weeks without improvement. Follows with vascular at [facility] for PAD.  On podiatry exam, erythema around R lateral foot wound extending proximally to met base and dorsally to 5th metatarsal; Right lateral 5th metatarsal head with 3 x 2 cm wound with overlying eschar, neg PTB or fluctuance  Labs significant for elevated ESR/CRP, mild leukocytosis.  Podiatry consulted. Given zosyn 3.375g x 1 in the ED. Concern for prior allergy to vancomycin vs Daptomycin?   - XR R foot: No convincing plain radiographic evidence for acute osteomyelitis.  - XR L foot : No tracking gas collections or gross radiographic evidence for osteomyelitis however if concern persists MRI suggested to further assess.  - CT R foot: No CT evidence of osteomyelitis. However, MRI would be a more sensitive test to evaluate for osteomyelitis.  - hold off on vancomycin or dapto given unclear allergy history and patient stable   - MRI of R foot (3/26): Osteomyelitis of the fifth metatarsal head and fifth digit proximal phalanx with adjacent soft tissue ulceration and phlegmon.  - BCx + gram + cocci, Staph Epi   - ID consulted, f/u recs   - c/w zosyn w/ pseudomonal dosing and Linezolid q12   - f/u Surveillance blood cultures  - f/u podiatry recs #Osteomyelitis R foot  Patient c/o right foot pain x 6-7 weeks.  Hx of diabetic foot infection with prior left toe amputation. On doxycycline outpatient x 2 weeks without improvement. Follows with vascular at [facility] for PAD.  On podiatry exam, erythema around R lateral foot wound extending proximally to met base and dorsally to 5th metatarsal; Right lateral 5th metatarsal head with 3 x 2 cm wound with overlying eschar, neg PTB or fluctuance  Labs significant for elevated ESR/CRP, mild leukocytosis.  Podiatry consulted. Given zosyn 3.375g x 1 in the ED. Concern for prior allergy to vancomycin vs Daptomycin?   - XR R foot: No convincing plain radiographic evidence for acute osteomyelitis.  - XR L foot : No tracking gas collections or gross radiographic evidence for osteomyelitis however if concern persists MRI suggested to further assess.  - CT R foot: No CT evidence of osteomyelitis. However, MRI would be a more sensitive test to evaluate for osteomyelitis.  - hold off on vancomycin or dapto given unclear allergy history and patient stable   - MRI of R foot (3/26): Osteomyelitis of the fifth metatarsal head and fifth digit proximal phalanx with adjacent soft tissue ulceration and phlegmon.  - BCx + gram + cocci, Staph Epi   - ID consulted, f/u recs   - f/u podiatry recs; Rec intervention/procedure. Pt is amendable. Plan on a date.   - c/w zosyn w/ pseudomonal dosing and Linezolid 600 mg q12   - f/u Surveillance blood cultures- NTD

## 2024-03-28 NOTE — DISCHARGE NOTE PROVIDER - CARE PROVIDER_API CALL
Nilson Archer  Infectious Disease  [address]  [place] [address]  Phone: [phone]  Fax: [phone]  Established Patient  Follow Up Time: 1 week

## 2024-03-28 NOTE — CONSULT NOTE ADULT - CONSULT REQUESTED DATE/TIME
Pre-procedure checklist reviewed.    MD aware of maximum contrast dose of 231 mL.
28-Mar-2024 11:15
27-Mar-2024 14:33
26-Mar-2024 03:59
26-Mar-2024 22:19
27-Mar-2024 12:29

## 2024-03-28 NOTE — PROGRESS NOTE ADULT - SUBJECTIVE AND OBJECTIVE BOX
OVERNIGHT EVENTS: MARIPOSA    SUBJECTIVE / INTERVAL HPI: Patient seen and examined at bedside. Pt is still complaining of intermittent pain in his right foot. Denies fever, chills, abd pain, n/v/d.     VITAL SIGNS:  Vital Signs Last 24 Hrs  T(C): 36.8 (28 Mar 2024 10:51), Max: 36.8 (28 Mar 2024 05:24)  T(F): 98.2 (28 Mar 2024 10:51), Max: 98.2 (28 Mar 2024 05:24)  HR: 69 (28 Mar 2024 10:51) (66 - 77)  BP: 114/55 (28 Mar 2024 10:51) (112/50 - 117/52)  BP(mean): 71 (28 Mar 2024 05:24) (71 - 71)  RR: 18 (28 Mar 2024 10:51) (18 - 19)  SpO2: 100% (28 Mar 2024 10:51) (100% - 100%)    Parameters below as of 28 Mar 2024 10:51  Patient On (Oxygen Delivery Method): nasal cannula  O2 Flow (L/min): 2      PHYSICAL EXAM:    Constitutional: resting comfortably in bed; mild distress 2/2 foot pain  Head: NC/AT  Eyes: PERRL, EOMI, anicteric sclera  ENT: no nasal discharge; MMM  Neck: supple; no JVD or thyromegaly  Respiratory: CTA B/L; no W/R/R, no retractions; on 2L NC  Cardiac: +S1/S2; RRR; no M/R/G  Gastrointestinal: abdomen soft, +ostomy bag draining billious stool    Extremities: WWP, no clubbing or cyanosis; no peripheral edema.   -per podiatry exam: erythema around Rlateral foot wound extending proximally to met base and dorsally to 5th metatarsal; R lateral 5th metatarsal head with 3 x 2 cm wound with overlying eschar, neg PTB; L distal hallux with 0.7 x 0.5 cm superficial wound with granular base. S/p 2-5 ray amputations on Left  Vascular: 2+ radial, non-palpable DP/PT pulses B/L (pulses dopplerable)  Neurologic: AAOx3;    MEDICATIONS:  MEDICATIONS  (STANDING):  acetaminophen     Tablet .. 975 milliGRAM(s) Oral every 8 hours  apixaban 5 milliGRAM(s) Oral two times a day  atorvastatin 80 milliGRAM(s) Oral at bedtime  clopidogrel Tablet 75 milliGRAM(s) Oral daily  dextrose 5%. 1000 milliLiter(s) (50 mL/Hr) IV Continuous <Continuous>  dextrose 5%. 1000 milliLiter(s) (100 mL/Hr) IV Continuous <Continuous>  dextrose 50% Injectable 25 Gram(s) IV Push once  dextrose 50% Injectable 25 Gram(s) IV Push once  dextrose 50% Injectable 12.5 Gram(s) IV Push once  glucagon  Injectable 1 milliGRAM(s) IntraMuscular once  influenza  Vaccine (HIGH DOSE) 0.7 milliLiter(s) IntraMuscular once  insulin lispro (ADMELOG) corrective regimen sliding scale   SubCutaneous three times a day before meals  insulin lispro (ADMELOG) corrective regimen sliding scale   SubCutaneous at bedtime  linezolid    Tablet 600 milliGRAM(s) Oral every 12 hours  piperacillin/tazobactam IVPB.. 3.375 Gram(s) IV Intermittent every 12 hours  sodium bicarbonate 650 milliGRAM(s) Oral every 12 hours    MEDICATIONS  (PRN):  dextrose Oral Gel 15 Gram(s) Oral once PRN Blood Glucose LESS THAN 70 milliGRAM(s)/deciliter      ALLERGIES:  Allergies    vancomycin (Unknown)    Intolerances        LABS:                        8.0    8.57  )-----------( 235      ( 28 Mar 2024 07:12 )             25.5     03-28    134<L>  |  108  |  72<H>  ----------------------------<  109<H>  3.6   |  14<L>  |  4.04<H>    Ca    8.8      28 Mar 2024 07:12  Phos  5.1     03-28  Mg     2.0     03-28    TPro  6.0  /  Alb  2.9<L>  /  TBili  0.2  /  DBili  x   /  AST  17  /  ALT  13  /  AlkPhos  64  03-28      Urinalysis Basic - ( 28 Mar 2024 07:12 )    Color: x / Appearance: x / SG: x / pH: x  Gluc: 109 mg/dL / Ketone: x  / Bili: x / Urobili: x   Blood: x / Protein: x / Nitrite: x   Leuk Esterase: x / RBC: x / WBC x   Sq Epi: x / Non Sq Epi: x / Bacteria: x      CAPILLARY BLOOD GLUCOSE      POCT Blood Glucose.: 182 mg/dL (28 Mar 2024 13:08)      RADIOLOGY & ADDITIONAL TESTS: Reviewed.

## 2024-03-28 NOTE — PROGRESS NOTE ADULT - PROBLEM SELECTOR PLAN 3
Bicarb 16 on admission-> 14. GAP 13 -> 16    - CTM  - Renal consulted, f/u recs Bicarb 16 on admission-> 14. GAP 13 -> 16  Now resolved.     - CTM  - Renal consulted, f/u recs

## 2024-03-29 LAB
-  CLINDAMYCIN: SIGNIFICANT CHANGE UP
-  ERYTHROMYCIN: SIGNIFICANT CHANGE UP
-  LINEZOLID: SIGNIFICANT CHANGE UP
-  OXACILLIN: SIGNIFICANT CHANGE UP
-  RIFAMPIN: SIGNIFICANT CHANGE UP
-  TRIMETHOPRIM/SULFAMETHOXAZOLE: SIGNIFICANT CHANGE UP
-  VANCOMYCIN: SIGNIFICANT CHANGE UP
ALBUMIN SERPL ELPH-MCNC: 3 G/DL — LOW (ref 3.3–5)
ALP SERPL-CCNC: 70 U/L — SIGNIFICANT CHANGE UP (ref 40–120)
ALT FLD-CCNC: 14 U/L — SIGNIFICANT CHANGE UP (ref 10–45)
ANION GAP SERPL CALC-SCNC: 12 MMOL/L — SIGNIFICANT CHANGE UP (ref 5–17)
APTT BLD: 43.3 SEC — HIGH (ref 24.5–35.6)
AST SERPL-CCNC: 18 U/L — SIGNIFICANT CHANGE UP (ref 10–40)
BASOPHILS # BLD AUTO: 0.04 K/UL — SIGNIFICANT CHANGE UP (ref 0–0.2)
BASOPHILS NFR BLD AUTO: 0.4 % — SIGNIFICANT CHANGE UP (ref 0–2)
BILIRUB SERPL-MCNC: <0.2 MG/DL — SIGNIFICANT CHANGE UP (ref 0.2–1.2)
BUN SERPL-MCNC: 72 MG/DL — HIGH (ref 7–23)
CALCIUM SERPL-MCNC: 8.7 MG/DL — SIGNIFICANT CHANGE UP (ref 8.4–10.5)
CHLORIDE SERPL-SCNC: 107 MMOL/L — SIGNIFICANT CHANGE UP (ref 96–108)
CO2 SERPL-SCNC: 15 MMOL/L — LOW (ref 22–31)
CREAT SERPL-MCNC: 4.38 MG/DL — HIGH (ref 0.5–1.3)
EGFR: 14 ML/MIN/1.73M2 — LOW
EOSINOPHIL # BLD AUTO: 0.17 K/UL — SIGNIFICANT CHANGE UP (ref 0–0.5)
EOSINOPHIL NFR BLD AUTO: 1.7 % — SIGNIFICANT CHANGE UP (ref 0–6)
GLUCOSE BLDC GLUCOMTR-MCNC: 105 MG/DL — HIGH (ref 70–99)
GLUCOSE BLDC GLUCOMTR-MCNC: 112 MG/DL — HIGH (ref 70–99)
GLUCOSE BLDC GLUCOMTR-MCNC: 127 MG/DL — HIGH (ref 70–99)
GLUCOSE BLDC GLUCOMTR-MCNC: 131 MG/DL — HIGH (ref 70–99)
GLUCOSE BLDC GLUCOMTR-MCNC: 175 MG/DL — HIGH (ref 70–99)
GLUCOSE SERPL-MCNC: 151 MG/DL — HIGH (ref 70–99)
GRAM STN FLD: SIGNIFICANT CHANGE UP
HCT VFR BLD CALC: 25 % — LOW (ref 39–50)
HGB BLD-MCNC: 7.6 G/DL — LOW (ref 13–17)
IMM GRANULOCYTES NFR BLD AUTO: 0.3 % — SIGNIFICANT CHANGE UP (ref 0–0.9)
INR BLD: 1.16 — SIGNIFICANT CHANGE UP (ref 0.85–1.18)
LYMPHOCYTES # BLD AUTO: 29.7 % — SIGNIFICANT CHANGE UP (ref 13–44)
LYMPHOCYTES # BLD AUTO: 3 K/UL — SIGNIFICANT CHANGE UP (ref 1–3.3)
MAGNESIUM SERPL-MCNC: 2 MG/DL — SIGNIFICANT CHANGE UP (ref 1.6–2.6)
MCHC RBC-ENTMCNC: 28.1 PG — SIGNIFICANT CHANGE UP (ref 27–34)
MCHC RBC-ENTMCNC: 30.4 GM/DL — LOW (ref 32–36)
MCV RBC AUTO: 92.6 FL — SIGNIFICANT CHANGE UP (ref 80–100)
METHOD TYPE: SIGNIFICANT CHANGE UP
MONOCYTES # BLD AUTO: 1.1 K/UL — HIGH (ref 0–0.9)
MONOCYTES NFR BLD AUTO: 10.9 % — SIGNIFICANT CHANGE UP (ref 2–14)
NEUTROPHILS # BLD AUTO: 5.76 K/UL — SIGNIFICANT CHANGE UP (ref 1.8–7.4)
NEUTROPHILS NFR BLD AUTO: 57 % — SIGNIFICANT CHANGE UP (ref 43–77)
NRBC # BLD: 0 /100 WBCS — SIGNIFICANT CHANGE UP (ref 0–0)
PHOSPHATE SERPL-MCNC: 5 MG/DL — HIGH (ref 2.5–4.5)
PLATELET # BLD AUTO: 247 K/UL — SIGNIFICANT CHANGE UP (ref 150–400)
POTASSIUM SERPL-MCNC: 3.8 MMOL/L — SIGNIFICANT CHANGE UP (ref 3.5–5.3)
POTASSIUM SERPL-SCNC: 3.8 MMOL/L — SIGNIFICANT CHANGE UP (ref 3.5–5.3)
PROT SERPL-MCNC: 6 G/DL — SIGNIFICANT CHANGE UP (ref 6–8.3)
PROTHROM AB SERPL-ACNC: 13.2 SEC — HIGH (ref 9.5–13)
RBC # BLD: 2.7 M/UL — LOW (ref 4.2–5.8)
RBC # FLD: 14.3 % — SIGNIFICANT CHANGE UP (ref 10.3–14.5)
SODIUM SERPL-SCNC: 134 MMOL/L — LOW (ref 135–145)
SPECIMEN SOURCE: SIGNIFICANT CHANGE UP
WBC # BLD: 10.1 K/UL — SIGNIFICANT CHANGE UP (ref 3.8–10.5)
WBC # FLD AUTO: 10.1 K/UL — SIGNIFICANT CHANGE UP (ref 3.8–10.5)

## 2024-03-29 PROCEDURE — 73630 X-RAY EXAM OF FOOT: CPT | Mod: 26,RT

## 2024-03-29 PROCEDURE — 71045 X-RAY EXAM CHEST 1 VIEW: CPT | Mod: 26

## 2024-03-29 PROCEDURE — 99233 SBSQ HOSP IP/OBS HIGH 50: CPT | Mod: GC

## 2024-03-29 PROCEDURE — 99232 SBSQ HOSP IP/OBS MODERATE 35: CPT

## 2024-03-29 RX ORDER — DEXTROSE 50 % IN WATER 50 %
15 SYRINGE (ML) INTRAVENOUS ONCE
Refills: 0 | Status: DISCONTINUED | OUTPATIENT
Start: 2024-03-29 | End: 2024-04-03

## 2024-03-29 RX ORDER — LINEZOLID 600 MG/300ML
600 INJECTION, SOLUTION INTRAVENOUS EVERY 12 HOURS
Refills: 0 | Status: DISCONTINUED | OUTPATIENT
Start: 2024-03-29 | End: 2024-04-01

## 2024-03-29 RX ORDER — ACETAMINOPHEN 500 MG
975 TABLET ORAL EVERY 8 HOURS
Refills: 0 | Status: DISCONTINUED | OUTPATIENT
Start: 2024-03-29 | End: 2024-04-03

## 2024-03-29 RX ORDER — CARVEDILOL PHOSPHATE 80 MG/1
6.25 CAPSULE, EXTENDED RELEASE ORAL EVERY 12 HOURS
Refills: 0 | Status: DISCONTINUED | OUTPATIENT
Start: 2024-03-29 | End: 2024-04-03

## 2024-03-29 RX ORDER — OXYCODONE HYDROCHLORIDE 5 MG/1
5 TABLET ORAL EVERY 6 HOURS
Refills: 0 | Status: DISCONTINUED | OUTPATIENT
Start: 2024-03-29 | End: 2024-03-29

## 2024-03-29 RX ORDER — GLUCAGON INJECTION, SOLUTION 0.5 MG/.1ML
1 INJECTION, SOLUTION SUBCUTANEOUS ONCE
Refills: 0 | Status: DISCONTINUED | OUTPATIENT
Start: 2024-03-29 | End: 2024-04-03

## 2024-03-29 RX ORDER — HEPARIN SODIUM 5000 [USP'U]/ML
7500 INJECTION INTRAVENOUS; SUBCUTANEOUS EVERY 8 HOURS
Refills: 0 | Status: DISCONTINUED | OUTPATIENT
Start: 2024-03-29 | End: 2024-03-29

## 2024-03-29 RX ORDER — DEXTROSE 50 % IN WATER 50 %
25 SYRINGE (ML) INTRAVENOUS ONCE
Refills: 0 | Status: DISCONTINUED | OUTPATIENT
Start: 2024-03-29 | End: 2024-04-03

## 2024-03-29 RX ORDER — SODIUM CHLORIDE 9 MG/ML
1000 INJECTION, SOLUTION INTRAVENOUS
Refills: 0 | Status: DISCONTINUED | OUTPATIENT
Start: 2024-03-29 | End: 2024-04-03

## 2024-03-29 RX ORDER — OXYCODONE HYDROCHLORIDE 5 MG/1
10 TABLET ORAL EVERY 6 HOURS
Refills: 0 | Status: DISCONTINUED | OUTPATIENT
Start: 2024-03-29 | End: 2024-03-30

## 2024-03-29 RX ORDER — SODIUM BICARBONATE 1 MEQ/ML
650 SYRINGE (ML) INTRAVENOUS EVERY 12 HOURS
Refills: 0 | Status: DISCONTINUED | OUTPATIENT
Start: 2024-03-29 | End: 2024-03-31

## 2024-03-29 RX ORDER — DEXTROSE 50 % IN WATER 50 %
12.5 SYRINGE (ML) INTRAVENOUS ONCE
Refills: 0 | Status: DISCONTINUED | OUTPATIENT
Start: 2024-03-29 | End: 2024-04-03

## 2024-03-29 RX ORDER — HYDROMORPHONE HYDROCHLORIDE 2 MG/ML
2 INJECTION INTRAMUSCULAR; INTRAVENOUS; SUBCUTANEOUS EVERY 4 HOURS
Refills: 0 | Status: DISCONTINUED | OUTPATIENT
Start: 2024-03-29 | End: 2024-03-29

## 2024-03-29 RX ORDER — CARVEDILOL PHOSPHATE 80 MG/1
6.25 CAPSULE, EXTENDED RELEASE ORAL EVERY 12 HOURS
Refills: 0 | Status: DISCONTINUED | OUTPATIENT
Start: 2024-03-29 | End: 2024-03-29

## 2024-03-29 RX ORDER — OXYCODONE HYDROCHLORIDE 5 MG/1
5 TABLET ORAL ONCE
Refills: 0 | Status: DISCONTINUED | OUTPATIENT
Start: 2024-03-29 | End: 2024-03-29

## 2024-03-29 RX ORDER — SERTRALINE 25 MG/1
50 TABLET, FILM COATED ORAL EVERY 24 HOURS
Refills: 0 | Status: DISCONTINUED | OUTPATIENT
Start: 2024-03-29 | End: 2024-04-03

## 2024-03-29 RX ORDER — PIPERACILLIN AND TAZOBACTAM 4; .5 G/20ML; G/20ML
3.38 INJECTION, POWDER, LYOPHILIZED, FOR SOLUTION INTRAVENOUS EVERY 8 HOURS
Refills: 0 | Status: DISCONTINUED | OUTPATIENT
Start: 2024-03-29 | End: 2024-03-30

## 2024-03-29 RX ORDER — ATORVASTATIN CALCIUM 80 MG/1
80 TABLET, FILM COATED ORAL AT BEDTIME
Refills: 0 | Status: DISCONTINUED | OUTPATIENT
Start: 2024-03-29 | End: 2024-04-03

## 2024-03-29 RX ORDER — INSULIN LISPRO 100/ML
VIAL (ML) SUBCUTANEOUS AT BEDTIME
Refills: 0 | Status: DISCONTINUED | OUTPATIENT
Start: 2024-03-29 | End: 2024-04-03

## 2024-03-29 RX ORDER — HYDROMORPHONE HYDROCHLORIDE 2 MG/ML
0.5 INJECTION INTRAMUSCULAR; INTRAVENOUS; SUBCUTANEOUS ONCE
Refills: 0 | Status: DISCONTINUED | OUTPATIENT
Start: 2024-03-29 | End: 2024-03-29

## 2024-03-29 RX ORDER — CLOPIDOGREL BISULFATE 75 MG/1
75 TABLET, FILM COATED ORAL DAILY
Refills: 0 | Status: DISCONTINUED | OUTPATIENT
Start: 2024-03-29 | End: 2024-04-01

## 2024-03-29 RX ORDER — ONDANSETRON 8 MG/1
4 TABLET, FILM COATED ORAL ONCE
Refills: 0 | Status: DISCONTINUED | OUTPATIENT
Start: 2024-03-29 | End: 2024-03-29

## 2024-03-29 RX ORDER — OXYCODONE HYDROCHLORIDE 5 MG/1
5 TABLET ORAL EVERY 6 HOURS
Refills: 0 | Status: DISCONTINUED | OUTPATIENT
Start: 2024-03-29 | End: 2024-04-03

## 2024-03-29 RX ORDER — INSULIN LISPRO 100/ML
VIAL (ML) SUBCUTANEOUS
Refills: 0 | Status: DISCONTINUED | OUTPATIENT
Start: 2024-03-29 | End: 2024-04-03

## 2024-03-29 RX ORDER — APIXABAN 2.5 MG/1
5 TABLET, FILM COATED ORAL EVERY 12 HOURS
Refills: 0 | Status: DISCONTINUED | OUTPATIENT
Start: 2024-03-29 | End: 2024-03-30

## 2024-03-29 RX ADMIN — OXYCODONE HYDROCHLORIDE 5 MILLIGRAM(S): 5 TABLET ORAL at 11:48

## 2024-03-29 RX ADMIN — HEPARIN SODIUM 7500 UNIT(S): 5000 INJECTION INTRAVENOUS; SUBCUTANEOUS at 22:09

## 2024-03-29 RX ADMIN — PIPERACILLIN AND TAZOBACTAM 25 GRAM(S): 4; .5 INJECTION, POWDER, LYOPHILIZED, FOR SOLUTION INTRAVENOUS at 20:02

## 2024-03-29 RX ADMIN — OXYCODONE HYDROCHLORIDE 5 MILLIGRAM(S): 5 TABLET ORAL at 21:11

## 2024-03-29 RX ADMIN — Medication 650 MILLIGRAM(S): at 10:48

## 2024-03-29 RX ADMIN — OXYCODONE HYDROCHLORIDE 10 MILLIGRAM(S): 5 TABLET ORAL at 21:47

## 2024-03-29 RX ADMIN — PIPERACILLIN AND TAZOBACTAM 25 GRAM(S): 4; .5 INJECTION, POWDER, LYOPHILIZED, FOR SOLUTION INTRAVENOUS at 00:14

## 2024-03-29 RX ADMIN — LINEZOLID 600 MILLIGRAM(S): 600 INJECTION, SOLUTION INTRAVENOUS at 06:16

## 2024-03-29 RX ADMIN — Medication 650 MILLIGRAM(S): at 22:09

## 2024-03-29 RX ADMIN — ATORVASTATIN CALCIUM 80 MILLIGRAM(S): 80 TABLET, FILM COATED ORAL at 22:44

## 2024-03-29 RX ADMIN — LINEZOLID 600 MILLIGRAM(S): 600 INJECTION, SOLUTION INTRAVENOUS at 18:36

## 2024-03-29 RX ADMIN — OXYCODONE HYDROCHLORIDE 5 MILLIGRAM(S): 5 TABLET ORAL at 20:11

## 2024-03-29 RX ADMIN — OXYCODONE HYDROCHLORIDE 5 MILLIGRAM(S): 5 TABLET ORAL at 10:48

## 2024-03-29 RX ADMIN — OXYCODONE HYDROCHLORIDE 10 MILLIGRAM(S): 5 TABLET ORAL at 22:47

## 2024-03-29 NOTE — PHYSICAL THERAPY INITIAL EVALUATION ADULT - ADDITIONAL COMMENTS
Pt lives in an elevator building with spouse. Pt uses w/c for environment negotiation, also has a walker. Pt reports being able to care for himself on his own; dressing, transfers, bed mobility, feeding. Pt denies having HHA or home PT at this time.

## 2024-03-29 NOTE — PRE-ANESTHESIA EVALUATION ADULT - NSANTHPMHFT_GEN_ALL_CORE
69yo M with a-fib, HTN, HL, obesity, DM, CAD, CKD admitted with R toe osteomyelitis 69yo M with a-fib (on AC last dose last night), HTN, HL, obesity, DM, CAD, 1vd, CHF (EF35-40% 2022),s/p AVR(2022) CKD, COPD on home oxygen (the pt does not know the cause of COPD) admitted with R toe osteomyelitis. The patient only transfer himself to and from wheelchair, last echo 2022, reportedly stress test within a few years.

## 2024-03-29 NOTE — PROGRESS NOTE ADULT - SUBJECTIVE AND OBJECTIVE BOX
Patient is a 68y old  Male who presents with a chief complaint of osteomyelitis of foot (28 Mar 2024 18:07)      INTERVAL HPI/ OVERNIGHT EVENTS  Dressing C/D/I. No further pedal complaints      LABS                        7.6    10.10 )-----------( 247      ( 29 Mar 2024 05:30 )             25.0     03-29    134<L>  |  107  |  72<H>  ----------------------------<  151<H>  3.8   |  15<L>  |  4.38<H>    Ca    8.7      29 Mar 2024 05:30  Phos  5.0     03-29  Mg     2.0     03-29    TPro  6.0  /  Alb  3.0<L>  /  TBili  <0.2  /  DBili  x   /  AST  18  /  ALT  14  /  AlkPhos  70  03-29        ICU Vital Signs Last 24 Hrs  T(C): 36.4 (29 Mar 2024 05:18), Max: 36.8 (28 Mar 2024 10:51)  T(F): 97.6 (29 Mar 2024 05:18), Max: 98.2 (28 Mar 2024 10:51)  HR: 68 (29 Mar 2024 05:18) (68 - 72)  BP: 156/83 (29 Mar 2024 05:18) (114/55 - 156/83)  BP(mean): --  ABP: --  ABP(mean): --  RR: 18 (29 Mar 2024 05:18) (18 - 18)  SpO2: 100% (29 Mar 2024 05:18) (92% - 100%)    O2 Parameters below as of 29 Mar 2024 05:18  Patient On (Oxygen Delivery Method): nasal cannula  O2 Flow (L/min): 2          RADIOLOGY  < from: MR Foot No Cont, Right (03.26.24 @ 16:17) >  IMPRESSION:  Osteomyelitisof the fifth metatarsal head and fifth digit proximal   phalanx with adjacent soft tissue ulceration and phlegmon.    --- End of Report ---    < end of copied text >    < from: CT Foot No Cont, Right (03.26.24 @ 02:25) >  IMPRESSION:    Nonspecific subcu< from: Xray Foot AP + Lateral, Right (03.26.24 @ 02:24) >    IMPRESSION:  No convincing plain radiographic evidence for acute osteomyelitis.    --- End of Report ---    < end of copied text >  taneous edema about the foot, which may be related to   lower extremity edema or cellulitis. No soft tissue gas seen.    No CT evidence of osteomyelitis. However, MRI would be a more sensitive   test to evaluate for osteomyelitis.    Curvilinear lucency within the base of the second proximal phalanx, which   may be related to old trauma or be related to an acute nondisplaced   intra-articular fracture. Correlation with point tenderness is   recommended.    --- End of Report ---    < end of copied text >      MICROBIOLOGY    PHYSICAL EXAM  Lower Extremity Focused  Vasc: PT/DP non-palpable b/l -bipasic waveforms heard at b/l DP and monophasic waveforms heard at b/l PT; Erythema around right lateral foot wound extending proximally to met base and dorsally to 5th metatarsal  Derm: Right lateral 5th metatrasl head with 3 x 2 cm wound with overlying eschar, (-)PTB, (+)malodor, (-)fluctuance, (-)tracking, (-)tunneling  Left distal hallux with hyperkeratotic lesion debrided to reveal 0.7 x 0.5 cm superficial wound with granular base, does not track deep  Neuro: protective sensation in tact  MSK: s/p 2-5 ray amputations on Left

## 2024-03-29 NOTE — OCCUPATIONAL THERAPY INITIAL EVALUATION ADULT - GENERAL OBSERVATIONS, REHAB EVAL
PT Ray present. Patient son present. Patient received seated at EOB +IV, +L toes 1-3 amputated, PT Ray present. Patient son present. Patient received seated at EOB +IV, +L toes 2-5 ray amputations, R foot gauze wrapping C/D/I, room air, NAD.

## 2024-03-29 NOTE — PROGRESS NOTE ADULT - SUBJECTIVE AND OBJECTIVE BOX
POST OP NOTE    BRITTANY JOHNSTON  MRN-[id number]    Procedure: Right partial 5th ray amputation   Surgeon: Dr. Elijah Lindsey  Assistants: Jamir Garcia PGY1    Patient tolerated procedure well without incident.  Patient transferred to PACU in stable condition.       PE / Post Op Check:       GEN: NAD, AAOx3, resting comfortably with pain controlled      LE Focused: CFT shows adequate perfusion b/l with no signs of ischemic compromise.  No strikethrough is appreciated on surgical dressings.  Dressings were dry, clean, and intact.  No neuromuscular deficits appreciated.

## 2024-03-29 NOTE — PROGRESS NOTE ADULT - ASSESSMENT
67 y/o M pmh PVD, DM, COPD presents for right foot wound and cellulitis. Confirmed on MRI Osteomyelitis of Right 5th proximal phalanx and 5th metatarsal head now S/P right partial 5th ray amputation.     Plan:   - C/w IV abx  - F/U Deep dirty cx swabs x 2  - F/U Clean margin 5th metatarsal bone cx and pathology   - Pt should be NWB to RLE  - Resume DVT ppx   - Resume Diet  - Rest of care per primary team    Plan d/w Attending. Podiatry following.

## 2024-03-29 NOTE — PROGRESS NOTE ADULT - SUBJECTIVE AND OBJECTIVE BOX
Physical Medicine and Rehabilitation Progress Note :       Patient is a 68y old  Male who presents with a chief complaint of osteomyelitis of foot (29 Mar 2024 09:07)      HPI:  HPI: Patient is a 67yo M with PMH of Chronic fib, DM, diabetic wounds s/p left toe amputations, COPD (on home 2L, on BIPAP overnight), Stage 5 CKD (baseline Cr 3.9), ?CAD, PAD, and colon cancer (w/ colostomy bag) who presents with right toe pain/wound for the past 6-7 weeks. Reportedly patient had outpatient XR of the foot 3 days ago which were concerning for "bone infection". States he was initially seen by his vascular surgeon, who "ballooned the veins" in his leg to improve blood flow, no stents placed at the time. Has been on doxycycline 100mg bid outpatient for the past 2 weeks without improvement. A few days ago, he developed worsening pain on his right foot with erythema and blistering. Describes intense pain and inability to bear weight on the right foot which brought him in to the ED. Of note, patient reports an allergy to IV antibiotic that causes angioedema and some difficulty breathing, does not recall whether it was vancomycin or daptomycin. Reports he recently switched nephrologists and was started on sodium bicarbonate; per son at bedside, baseline Cr is 4.02. Makes urine. Additionally states he "needs stents " for his heart, but does not currently have any. Uses NC 2L at baseline, CPAP at night. No other complaints at this time.     In the ED:  Vital Signs: T 98.4 F, HR 87, /57, RR 17, SpO2 100% on 2L NC  Labs: significant for ESR 85, CRP 43.3, WBC 10.66, Hgb 7.8, HCO3 16, BUN/Cr 81/4.20, lactate 0.5  XR R foot: pending read; wet read c/f osteomyelitis  CT R foot: pending read  EKG: pending  Interventions: zosyn 3.375g IV x 1, not given vanc due to concern for allergy  Consults: podiatry   (26 Mar 2024 04:45)                            7.6    10.10 )-----------( 247      ( 29 Mar 2024 05:30 )             25.0       03-29    134<L>  |  107  |  72<H>  ----------------------------<  151<H>  3.8   |  15<L>  |  4.38<H>    Ca    8.7      29 Mar 2024 05:30  Phos  5.0     03-29  Mg     2.0     03-29    TPro  6.0  /  Alb  3.0<L>  /  TBili  <0.2  /  DBili  x   /  AST  18  /  ALT  14  /  AlkPhos  70  03-29    Vital Signs Last 24 Hrs  T(C): 36.6 (29 Mar 2024 09:26), Max: 36.6 (29 Mar 2024 09:26)  T(F): 97.9 (29 Mar 2024 09:26), Max: 97.9 (29 Mar 2024 09:26)  HR: 74 (29 Mar 2024 09:28) (68 - 75)  BP: 139/50 (29 Mar 2024 09:26) (128/65 - 156/83)  BP(mean): --  RR: 18 (29 Mar 2024 09:28) (18 - 18)  SpO2: 100% (29 Mar 2024 09:28) (92% - 100%)    Parameters below as of 29 Mar 2024 09:28  Patient On (Oxygen Delivery Method): nasal cannula  O2 Flow (L/min): 2      MEDICATIONS  (STANDING):  acetaminophen     Tablet .. 975 milliGRAM(s) Oral every 8 hours  apixaban 5 milliGRAM(s) Oral two times a day  atorvastatin 80 milliGRAM(s) Oral at bedtime  clopidogrel Tablet 75 milliGRAM(s) Oral daily  dextrose 5%. 1000 milliLiter(s) (100 mL/Hr) IV Continuous <Continuous>  dextrose 5%. 1000 milliLiter(s) (50 mL/Hr) IV Continuous <Continuous>  dextrose 50% Injectable 12.5 Gram(s) IV Push once  dextrose 50% Injectable 25 Gram(s) IV Push once  dextrose 50% Injectable 25 Gram(s) IV Push once  glucagon  Injectable 1 milliGRAM(s) IntraMuscular once  influenza  Vaccine (HIGH DOSE) 0.7 milliLiter(s) IntraMuscular once  insulin lispro (ADMELOG) corrective regimen sliding scale   SubCutaneous at bedtime  insulin lispro (ADMELOG) corrective regimen sliding scale   SubCutaneous three times a day before meals  linezolid    Tablet 600 milliGRAM(s) Oral every 12 hours  piperacillin/tazobactam IVPB.. 3.375 Gram(s) IV Intermittent every 12 hours  sodium bicarbonate 650 milliGRAM(s) Oral every 12 hours    MEDICATIONS  (PRN):  dextrose Oral Gel 15 Gram(s) Oral once PRN Blood Glucose LESS THAN 70 milliGRAM(s)/deciliter  oxyCODONE    IR 5 milliGRAM(s) Oral every 6 hours PRN Moderate Pain (4 - 6)      T(C): 36.6 (03-29-24 @ 09:26), Max: 36.6 (03-29-24 @ 09:26)  HR: 74 (03-29-24 @ 09:28) (68 - 75)  BP: 139/50 (03-29-24 @ 09:26) (128/65 - 156/83)  RR: 18 (03-29-24 @ 09:28) (18 - 18)  SpO2: 100% (03-29-24 @ 09:28) (92% - 100%)        Physical Exam:      69 yo man lying comfortably in semi Davis's position , awake , alert , no acute complaints     Head: normocephalic , atraumatic    Eyes: PERRLA , EOMI , no nystagmus , sclera anicteric    ENT / FACE: neg nasal discharge , uvula midline , no oropharyngeal erythema / exudate    Neck: supple , negative JVD , negative carotid bruits , no thyromegaly    Chest: CTA bilaterally , neg wheeze / rhonchi / rales / crackles / egophany    Cardiovascular: regular rate and rhythm , neg murmurs / rubs / gallops    Abdomen: soft , non distended , non tender to palpation in all 4 quadrants ,  normal bowel sounds     Extremities:  R foot dressing    Neurologic Exam:     Alert and oriented        Motor Exam:        > 4/5 x 4 extremities       Sensation:        dec distal LE's    DTR:           biceps/brachioradialis: equal                            patella/ankle: equal        Initial Functional Status Assessment :       Previous Level of Function:     · Ambulation Skills	independent; needs device  · Transfer Skills	independent; needs device  · ADL Skills	independent; needs device  · Work/Leisure Activity	independent; needs device  · Additional Comments	Pt lives in an elevator building with spouse. Pt uses w/c for environment negotiation, also has a walker. Pt reports being able to care for himself on his own; dressing, transfers, bed mobility, feeding. Pt denies having HHA or home PT at this time.    Cognitive Status Examination:   · Orientation	oriented to person, place, time and situation  · Level of Consciousness	alert  · Follows Commands and Answers Questions	100% of the time  · Personal Safety and Judgment	intact  · Short Term Memory	intact    Range of Motion Exam:   · Range of Motion Examination	bilateral upper extremity ROM was WFL (within functional limits); bilateral lower extremity ROM was WFL (within functional limits)    Manual Muscle Testing:   · Manual Muscle Testing Results	grossly assessed due to  pt unwilling to have formal MMT testing, able to perform AROM wihtout any deficits, UE and LE at least 3/5    Bed Mobility: Sit to Supine:     · Level of [place]	unable to perform; pt declined to perform    Bed Mobility: Supine to Sit:     · Level of [place]	not observed but pt received in sitting and pt reports that he was able to sit up by himself    Transfer: Sit to Stand:     · Level of [place]	unable to perform; pt declined to perform    Gait Skills:     · Level of [place]	unable to perform; pt declined to perform    Balance Skills Assessment:     · Sitting Balance: Static	good balance  · Sitting Balance: Dynamic	fair balance  · Systems Impairment Contributing to Balance Disturbance	musculoskeletal  · Identified Impairments Contributing to Balance Disturbance	impaired motor control; impaired postural control; decreased strength    Sensory Examination:   Sensory Examination:    Grossly Intact:   · Gross Sensory Examination	Grossly Intact; pt denies numbness or tingling      Clinical Impressions:   · Criteria for Skilled Therapeutic Interventions	rehab potential; impairments found; anticipated equipment needs at discharge; predicted duration of therapy intervention; therapy frequency; anticipated discharge recommendation; risk reduction/prevention; functional limitations in following categories  · Impairments Found (describe specific impairments)	aerobic capacity/endurance; gait, locomotion, and balance; muscle strength; ergonomics and body mechanics; gross motor; poor safety awareness; neuromotor development and sensory integration; posture  · Functional Limitations in Following Categories (describe specific limitations)	self-care; home management; community/leisure  · Risk Reduction/Prevention (Describe Specific Areas of risk reduction/prevention)	risk factors  · Risk Areas	fall            PM&R Impression : as above    Current disposition plan recommendation :    subacute rehab placement

## 2024-03-29 NOTE — PROGRESS NOTE ADULT - ASSESSMENT
{\rtf1\pklxcp61214\ansi\zsosmsp4865\ftnbj\uc1\deff0  {\fonttbl{\f0 \fnil Segoe UI;}{\f1 \fnil \fcharset0 Segoe UI;}{\f2 \fnil Times New Cuate;}}  {\colortbl ;\twz470\vqbas180\ypdt248 ;\red0\green0\blue0 ;\red0\green0\coqg425 ;\red0\green0\blue0 ;}  {\stylesheet{\f0\fs20 Normal;}{\cs1 Default Paragraph Font;}{\cs2\f0\fs16 Line Number;}{\cs3\f2\fs24\ul\cf3 Hyperlink;}}  {\*\revtbl{Unknown;}}  \llnmbe10822\xeylca80150\gmkty0843\jiffj3390\uautg1652\cuscq1310\beoeymf119\yofmxrj398\nogrowautofit\ypzjom367\formshade\nofeaturethrottle1\dntblnsbdb\fet4\aendnotes\aftnnrlc\pgbrdrhead\pgbrdrfoot  \sectd\vuqrql23746\ftrdra68015\guttersxn0\tciyphzq9176\dhofptdr6579\hcspgore3952\qmtjkdjl2962\svkybsg829\srmjejb881\sbkpage\pgncont\pgndec  \plain\plain\f0\fs24\ql\plain\f0\fs24\plain\f0\fs20\akza9406\hich\f0\dbch\f0\loch\f0\fs20\par  I M\par  \par   68 y o M wiht PMH of DM, diabetic wounds s/p left toe amputations, COPD (on home 2L, on BIPAP overnight), CKD (unknown baseline Cr), and colon cancer (w/ colostomy bag) who presents with right toe pain/wound for the past 6-7 weeks, admitted for osteomyelitis   of R foot. \par  \par  \plain\f1\fs20\rtxb5769\hich\f1\dbch\f1\loch\f1\cf2\fs20\ul{\field{\*\fldinst HYPERLINK [id number] }{\fldrslt Problem/Plan - 1:}}\plain\f0\fs20\jdkr8411\hich\f0\dbch\f0\loch\f0\fs20\ql\par  \'b7  {\*\bkmkstart [id number]}{\*\bkmkend xy10461657159}Problem: {\*\bkmkstart hv39796878537}{\*\bkmkend up07174818868}Foot osteomyelitis. \par  \'b7  {\*\bkmkstart qr37354925603}{\*\bkmkend zl94717415606}Plan: {\*\bkmkstart go74371335555}{\*\bkmkend fr02889011135}#Osteomyelitis R foot\par  Patient c/o right foot pain x 6-7 weeks.\par  Hx of diabetic foot infection with prior left toe amputation. On doxycycline outpatient x 2 weeks without improvement. Follows with vascular at [facility] for PAD.\par  On podiatry exam, erythema around R lateral foot wound extending proximally to met base and dorsally to 5th metatarsal; Right lateral 5th metatarsal head with 3 x 2 cm wound with overlying eschar, neg PTB or fluctuance\par  Labs significant for elevated ESR/CRP, mild leukocytosis.\par  Podiatry consulted. Given zosyn 3.375g x 1 in the ED. Concern for prior allergy to vancomycin vs Daptomycin? \par  - XR R foot: No convincing plain radiographic evidence for acute osteomyelitis.\par  - XR L foot : No tracking gas collections or gross radiographic evidence for osteomyelitis however if concern persists MRI suggested to further assess.\par  - CT R foot: No CT evidence of osteomyelitis. However, MRI would be a more sensitive test to evaluate for osteomyelitis.\par  - hold off on vancomycin or dapto given unclear allergy history and patient stable \par  - MRI of R foot (3/26): Osteomyelitis of the fifth metatarsal head and fifth digit proximal phalanx with adjacent soft tissue ulceration and phlegmon.\par  - BCx + gram + cocci, Staph Epi \par  - ID consulted, f/u recs \par  - f/u podiatry recs; Rec intervention/procedure. Pt is amendable. Plan on a date. \par  - c/w zosyn w/ pseudomonal dosing and Linezolid 600 mg q12 \par  - f/u Surveillance blood cultures- NTD.\plain\f1\fs20\kqbd0519\hich\f1\dbch\f1\loch\f1\cf2\fs20\strike\plain\f0\fs20\mucw9016\hich\f0\dbch\f0\loch\f0\fs20\par  \par  \plain\f1\fs20\qzch5936\hich\f1\dbch\f1\loch\f1\cf2\fs20\ul{\field{\*\fldinst HYPERLINK [id number] }{\fldrslt Problem/Plan - 2:}}\plain\f0\fs20\dcpo5673\hich\f0\dbch\f0\loch\f0\fs20\ql\par  \'b7  {\*\bkmkstart ll00675485069}{\*\bkmkend gb64489544158}Problem: {\*\bkmkstart ku53092834668}{\*\bkmkend ce36861541013}Stage 5 chronic kidney disease not on chronic dialysis. \par  \'b7  {\*\bkmkstart on75509724221}{\*\bkmkend tg14901566412}Plan: {\*\bkmkstart mu91362576775}{\*\bkmkend dz03418877885}Patient with hx of CKD, baseline Cr 3.9- 4 per son. \par  On presentation, BUN/Cr 81/4.20 with metabolic acidosis likely iso acute renal failure, HCO3 16. \par  Cystatin C \par  S/P 500 cc LR bolus \par  Home med: sodium bicarb 650mg bid.\par  \par  - F/u Renal recs\par  - f/u urine lytes, cystatin C\par  - avoid nephrotoxic agents\par  - monitor urine output\par  - continue sodium bicarb 650 bid.\par  \par  \plain\f1\fs20\hzit6803\hich\f1\dbch\f1\loch\f1\cf2\fs20\ul{\field{\*\fldinst HYPERLINK [id number] }{\fldrslt Problem/Plan - 3:}}\plain\f0\fs20\opkd0889\hich\f0\dbch\f0\loch\f0\fs20\ql\par  \'b7  {\*\bkmkstart kc72113053594}{\*\bkmkend kr78500520750}Problem: {\*\bkmkstart dw89452293649}{\*\bkmkend zv45596960945}Metabolic acidosis. \par  \'b7  {\*\bkmkstart qu65632054498}{\*\bkmkend ea13001456187}Plan: {\*\bkmkstart ec55556992132}{\*\bkmkend ux91026648554}Bicarb 16 on admission-> 14. GAP 13 -> 16\par  Now resolved. \par  \par  - CTM\par  - Renal consulted, f/u recs.\plain\f1\fs20\cbsm7025\hich\f1\dbch\f1\loch\f1\cf2\fs20\strike\plain\f0\fs20\qrkq3189\hich\f0\dbch\f0\loch\f0\fs20\par  \par  \plain\f1\fs20\nbnl7235\hich\f1\dbch\f1\loch\f1\cf2\fs20\ul{\field{\*\fldinst HYPERLINK [id number] }{\fldrslt Problem/Plan - 4:}}\plain\f0\fs20\mcuq3479\hich\f0\dbch\f0\loch\f0\fs20\ql\par  \'b7  {\*\bkmkstart dh17286060075}{\*\bkmkend gs91584928998}Problem: {\*\bkmkstart yi95510028173}{\*\bkmkend ob39838403699}Anemia. \par  \'b7  {\*\bkmkstart ic13330088539}{\*\bkmkend fd89353392084}Plan: {\*\bkmkstart jk53436294740}{\*\bkmkend ue92602988747}Likely AOCD iso chronic kidney disease. No s/s of active bleeding. Per old lab review, Hgb was 9 in 1/2024. \par  Hgb currently 8\par  \par  - F/u iron studies\par  - maintain active T&S\par  - transfuse for hgb < 7.\plain\f1\fs20\qqkd7943\hich\f1\dbch\f1\loch\f1\cf2\fs20\strike\plain\f0\fs20\tpcc7168\hich\f0\dbch\f0\loch\f0\fs20\par  \par  \plain\f1\fs20\ancg6215\hich\f1\dbch\f1\loch\f1\cf2\fs20\ul{\field{\*\fldinst HYPERLINK [id number] }{\fldrslt Problem/Plan - 5:}}\plain\f0\fs20\tfir4880\hich\f0\dbch\f0\loch\f0\fs20\ql\par  \'b7  {\*\bkmkstart to17386295206}{\*\bkmkend aq96347903054}Problem: {\*\bkmkstart vi43732014440}{\*\bkmkend qy03312758822}Type 2 diabetes mellitus. \par  \'b7  {\*\bkmkstart ho81426738009}{\*\bkmkend tg84800921468}Plan: {\*\bkmkstart ef98459939072}{\*\bkmkend vd30802562337}Patient with hx of diabetes, reportedly last A1c 5. Self-titrates home insulin doses based on blood sugar, but unable to name how much   he takes on average. \par  - A1c 5 (3/26)\par  - monitor FSG\par  - moderate ISS.\par  \par  \plain\f1\fs20\cunt7200\hich\f1\dbch\f1\loch\f1\cf2\fs20\ul{\field{\*\fldinst HYPERLINK [id number] }{\fldrslt Problem/Plan - 6:}}\plain\f0\fs20\xzyh4338\hich\f0\dbch\f0\loch\f0\fs20\ql\par  \'b7  {\*\bkmkstart bt51622521613}{\*\bkmkend ew10535786446}Problem: {\*\bkmkstart ki20584737854}{\*\bkmkend rk55244464619}Chronic atrial fibrillation. \par  \'b7  {\*\bkmkstart kg13823244576}{\*\bkmkend if21153338343}Plan: {\*\bkmkstart ld25222981618}{\*\bkmkend oy66398758002}Pt with h/o of paroxysmal AFib per Cardiologist. Currently rate controlled. \par  Home meds: Eliquis 5 bid, coreg 6.25 mg bid \par  \par  - c/w eliquis\par  - hold coreg for now.\par  \par  \plain\f1\fs20\rjxa5405\hich\f1\dbch\f1\loch\f1\cf2\fs20\ul{\field{\*\fldinst HYPERLINK [id number] }{\fldrslt Problem/Plan - 7:}}\plain\f0\fs20\dvet2923\hich\f0\dbch\f0\loch\f0\fs20\ql\par  \'b7  {\*\bkmkstart gr00504936743}{\*\bkmkend ws26676160442}Problem: {\*\bkmkstart nl00057646618}{\*\bkmkend cc98708218713}H/O aortic valve replacement. \par  \'b7  {\*\bkmkstart px65156238754}{\*\bkmkend xx95480343265}Plan: {\*\bkmkstart om82147409253}{\*\bkmkend mq05854372835}Per pt, history of aortic valve replacement ~1-2 years ago. \par  Unclear about further cardiac history. Pt states he takes Eliquis and Plavix at home. States he took Plavix yesterday (3/25) and stopped plavix 3 days ago.\par  Upon more collatoral from pt's Cardiologist, Pt has history of MI 1-2 years ago Is on the Eliquis for paraoxysmal afib. \par  Cardiologist Dr. Adams,  \par  - Plan as above.\par  \par  \plain\f1\fs20\fret5212\hich\f1\dbch\f1\loch\f1\cf2\fs20\ul{\field{\*\fldinst HYPERLINK [id number] }{\fldrslt Problem/Plan - 8:}}\plain\f0\fs20\znqp6769\hich\f0\dbch\f0\loch\f0\fs20\ql\par  \'b7  {\*\bkmkstart gr02455296976}{\*\bkmkend id07967540227}Problem: {\*\bkmkstart gx83365339362}{\*\bkmkend oc84913137735}Peripheral arterial disease. \par  \'b7  {\*\bkmkstart ml89302227267}{\*\bkmkend oj13594964317}Plan: {\*\bkmkstart pt46739646639}{\*\bkmkend ch32530269192}History of right lower extremity angioplasty in 1/2024. \par  Per collateral  from Pt's Vascular Surgeon, Pt had a balloon Angioplasty in Proximal AT abd below the knee popliteal \par  as started on Plavix since with 1 conventional and 1 drug eluting stent. \par  Home meds: Plavix 75 mg qd\par  \par  - c/w home meds.\par  \par  \plain\f1\fs20\wvss9416\hich\f1\dbch\f1\loch\f1\cf2\fs20\ul{\field{\*\fldinst HYPERLINK [id number] }{\fldrslt Problem/Plan - 9:}}\plain\f0\fs20\lbey6651\hich\f0\dbch\f0\loch\f0\fs20\ql\par  \'b7  {\*\bkmkstart tu03184566448}{\*\bkmkend bb21243154912}Problem: {\*\bkmkstart ot97941951752}{\*\bkmkend hr31709689843}COPD without exacerbation. \par  \'b7  {\*\bkmkstart wo46772547281}{\*\bkmkend eo09053776447}Plan: {\*\bkmkstart jt61909179964}{\*\bkmkend xl33842083036}Hx of COPD, on 2L O2 at home. \par  No s/s of exacerbation on this admission, satting 100% on home O2. Uses CPAP at nighttime. \par  - c/w home O2\par  - c/w CPAP at nighttime.\par  \par  \plain\f1\fs20\qeqo3177\hich\f1\dbch\f1\loch\f1\cf2\fs20\ul{\field{\*\fldinst HYPERLINK [id number] }{\fldrslt Problem/Plan - 10:}}\plain\f0\fs20\xfvh7467\hich\f0\dbch\f0\loch\f0\fs20\ql\par  \'b7  {\*\bkmkstart kt18432192041}{\*\bkmkend yo78238458543}Problem: {\*\bkmkstart ke58525350248}{\*\bkmkend mm29372988556}History of colon cancer. \par  \'b7  {\*\bkmkstart af81286910754}{\*\bkmkend fm85011037353}Plan; {\*\bkmkstart bk09470614627}{\*\bkmkend ix38407279530}Hx of colon cancer, now with colostomy. \par  - no active interventions.\par  \par  \plain\f1\fs20\cxrl3352\hich\f1\dbch\f1\loch\f1\cf2\fs20\ul{\field{\*\fldinst HYPERLINK [id number] }{\fldrslt Problem/Plan - 11:}}\plain\f0\fs20\yvdd9361\hich\f0\dbch\f0\loch\f0\fs20\ql\par  \'b7  {\*\bkmkstart qh798653710669}{\*\bkmkend ld356085190334}Problem: {\*\bkmkstart lh597794420597}{\*\bkmkend us891230921504}Prophylactic measure. \par  \'b7  {\*\bkmkstart vy617668051887}{\*\bkmkend ld124458530400}Plan: {\*\bkmkstart mm589308309539}{\*\bkmkend wj364857836624}F: s/p 500 cc LR\par  E: Replete as needed, caution due to CKD \par  N: consistent carb\par  D: Plavix and Eliquis \par  Dispo: RMF.\par  \par  \par  \plain\f1\fs16\aoan5973\hich\f1\dbch\f1\loch\f1\cf2\fs16\par  \plain\f0\fs20\ntvl9826\hich\f0\dbch\f0\loch\f0\fs20\par  }

## 2024-03-29 NOTE — PHYSICAL THERAPY INITIAL EVALUATION ADULT - PATIENT/FAMILY/SIGNIFICANT OTHER GOALS STATEMENT, PT EVAL
pt unwilling at this time for further functional assessment, however was able to perform AROM UE/LE, pt requesting to do transfers/gait assessment after surgery

## 2024-03-29 NOTE — PROGRESS NOTE ADULT - SUBJECTIVE AND OBJECTIVE BOX
Patient is a 68y Male seen and evaluated at bedside.       Meds:    acetaminophen     Tablet .. 975 every 8 hours  apixaban 5 two times a day  atorvastatin 80 at bedtime  clopidogrel Tablet 75 daily  dextrose 5%. 1000 <Continuous>  dextrose 5%. 1000 <Continuous>  dextrose 50% Injectable 25 once  dextrose 50% Injectable 25 once  dextrose 50% Injectable 12.5 once  dextrose Oral Gel 15 once PRN  glucagon  Injectable 1 once  influenza  Vaccine (HIGH DOSE) 0.7 once  insulin lispro (ADMELOG) corrective regimen sliding scale  three times a day before meals  insulin lispro (ADMELOG) corrective regimen sliding scale  at bedtime  linezolid    Tablet 600 every 12 hours  ondansetron Injectable 4 once  oxyCODONE    IR 5 every 6 hours PRN  piperacillin/tazobactam IVPB.. 3.375 every 12 hours  sodium bicarbonate 650 every 12 hours      T(C): , Max: 36.6 (03-29-24 @ 09:26)  T(F): , Max: 97.9 (03-29-24 @ 09:26)  HR: 74 (03-29-24 @ 09:28)  BP: 139/50 (03-29-24 @ 09:26)  BP(mean): --  RR: 18 (03-29-24 @ 09:28)  SpO2: 100% (03-29-24 @ 09:28)  Wt(kg): --    03-28 @ 07:01  -  03-29 @ 07:00  --------------------------------------------------------  IN: 0 mL / OUT: 750 mL / NET: -750 mL          Review of Systems:  ROS negative except as per HPI      PHYSICAL EXAM:  Neck: supple; no JVD  Respiratory: CTA B/L; on 2L NC  Cardiac: RRR; no M/R/G  Gastrointestinal: abdomen soft, NT/ND; no rebound or guarding  Extremities: no peripheral edema, R foot with ulcer in the region of fifth metatarsal.   Neurologic: AAOx3; no focal deficits    LABS:                        7.6    10.10 )-----------( 247      ( 29 Mar 2024 05:30 )             25.0     03-29    134<L>  |  107  |  72<H>  ----------------------------<  151<H>  3.8   |  15<L>  |  4.38<H>    Ca    8.7      29 Mar 2024 05:30  Phos  5.0     03-29  Mg     2.0     03-29    TPro  6.0  /  Alb  3.0<L>  /  TBili  <0.2  /  DBili  x   /  AST  18  /  ALT  14  /  AlkPhos  70  03-29      PT/INR - ( 29 Mar 2024 11:48 )   PT: 13.2 sec;   INR: 1.16          PTT - ( 29 Mar 2024 11:48 )  PTT:43.3 sec  Urinalysis Basic - ( 29 Mar 2024 05:30 )    Color: x / Appearance: x / SG: x / pH: x  Gluc: 151 mg/dL / Ketone: x  / Bili: x / Urobili: x   Blood: x / Protein: x / Nitrite: x   Leuk Esterase: x / RBC: x / WBC x   Sq Epi: x / Non Sq Epi: x / Bacteria: x            RADIOLOGY & ADDITIONAL STUDIES:

## 2024-03-29 NOTE — PROGRESS NOTE ADULT - ASSESSMENT
67 yo M w/ CKD4 (BCr 3.8-4.2 since 6/2023) admitted for R foot OM, on zosyn/linezolid, now pending amputation. Kidney function close to baseline, slight variation in Cr may be hemodynamic due to relative changes in BP.  Pt also with hx of PAD, COPD on home O2, s/p colectomy with colostomy for colon ca      Imp: CKD at baseline.   Met acidosis due to above  Cr 4.38 today  Cystatin C 4.3, GFR by cystatin C 11 3/28  HCO3 15  CPK wnl      Plan:  Obtain UA, U. Na and UPCR.   Increase sodium bicarb to 2 tabs BID  Avoid hypotension, maintain SBP>120 for renal perfusion.   Daily labs  Strict I&O  Renal diet  Cont. renally dosed medications  T. sat 19, holding IV Fe supplement iso active Osteomyelitis. Can start Epo 10K units weekly or 20K units every 2 weeks

## 2024-03-29 NOTE — PROGRESS NOTE ADULT - PROBLEM SELECTOR PLAN 1
Patient c/o right foot pain x 6-7 weeks.  Hx of diabetic foot infection with prior left toe amputation. On doxycycline outpatient x 2 weeks without improvement. Follows with vascular at [facility] for PAD.  On podiatry exam, erythema around R lateral foot wound extending proximally to met base and dorsally to 5th metatarsal; Right lateral 5th metatarsal head with 3 x 2 cm wound with overlying eschar, neg PTB or fluctuance  Labs significant for elevated ESR/CRP, mild leukocytosis.  Podiatry consulted. Given zosyn 3.375g x 1 in the ED. Concern for prior allergy to vancomycin vs Daptomycin?   - XR R foot: No convincing plain radiographic evidence for acute osteomyelitis.  - XR L foot : No tracking gas collections or gross radiographic evidence for osteomyelitis however if concern persists MRI suggested to further assess.  - CT R foot: No CT evidence of osteomyelitis. However, MRI would be a more sensitive test to evaluate for osteomyelitis.  - MRI of R foot (3/26): Osteomyelitis of the fifth metatarsal head and fifth digit proximal phalanx with adjacent soft tissue ulceration and phlegmon.  - hold off on vancomycin or dapto given unclear allergy history and patient stable   - BCx + gram + cocci, Staph Epi.   - F/U Surveillance blood cultures- NTD  - ID consulted, f/u recs   - f/u podiatry recs;  - S/P right toe amputation today 3/29. Follow up surgical margins.   - c/w zosyn w/ pseudomonal dosing and Linezolid 600 mg q12

## 2024-03-29 NOTE — OCCUPATIONAL THERAPY INITIAL EVALUATION ADULT - ADDITIONAL COMMENTS
Patient reports living with his wife in an elevator access apartment building with no SUSIE. Patient states he was mostly w/c bound, able to transfer from bed<>w/c  and w/c <> shower chair with stand pivot. Patient states he was independent with ADL's and transfers with RW as needed. Patient is R hand dominant. Patient has a bathtub shower with shower chair and grab bars. Patient does not have a HHA.

## 2024-03-29 NOTE — PROGRESS NOTE ADULT - PROBLEM SELECTOR PLAN 4
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 No s/s of active bleeding. Per old lab review, Hgb was 9 in 1/2024.   Hgb currently 8    - F/u iron studies  - maintain active T&S  - transfuse for hgb < 7

## 2024-03-29 NOTE — PHYSICAL THERAPY INITIAL EVALUATION ADULT - MANUAL MUSCLE TESTING RESULTS, REHAB EVAL
pt unwilling to have formal MMT testing, able to perform AROM wihtout any deficits, UE and LE at least 3/5/grossly assessed due to

## 2024-03-29 NOTE — PROGRESS NOTE ADULT - SUBJECTIVE AND OBJECTIVE BOX
PRE-OP NOTE    Pre-Op Diagnosis: OM  Planned Procedure: R partial 5th ray amputation  Scheduled Date / Time: 3/29 1PM  Indication: OM  Labs:                       7.6    10.10 )-----------( 247      ( 29 Mar 2024 05:30 )             25.0   03-29    134<L>  |  107  |  72<H>  ----------------------------<  151<H>  3.8   |  15<L>  |  4.38<H>    Ca    8.7      29 Mar 2024 05:30  Phos  5.0     03-29  Mg     2.0     03-29    TPro  6.0  /  Alb  3.0<L>  /  TBili  <0.2  /  DBili  x   /  AST  18  /  ALT  14  /  AlkPhos  70  03-29  LIVER FUNCTIONS - ( 29 Mar 2024 05:30 )  Alb: 3.0 g/dL / Pro: 6.0 g/dL / ALK PHOS: 70 U/L / ALT: 14 U/L / AST: 18 U/L / GGT: x           Vitals: Vital Signs Last 24 Hrs  T(C): 36.6 (29 Mar 2024 09:26), Max: 36.6 (29 Mar 2024 09:26)  T(F): 97.9 (29 Mar 2024 09:26), Max: 97.9 (29 Mar 2024 09:26)  HR: 74 (29 Mar 2024 09:28) (68 - 75)  BP: 139/50 (29 Mar 2024 09:26) (128/65 - 156/83)  BP(mean): --  RR: 18 (29 Mar 2024 09:28) (18 - 18)  SpO2: 100% (29 Mar 2024 09:28) (92% - 100%)    Parameters below as of 29 Mar 2024 09:28  Patient On (Oxygen Delivery Method): nasal cannula  O2 Flow (L/min): 2    PE:   Official CXR reading: (On Chart)  Official EKG reading: (On Chart)  Type and Cross/Screen:   NPO after MN  Antibiotics:   Anesthesia evaluation (on chart)  Operative Consent (on chart)

## 2024-03-29 NOTE — OCCUPATIONAL THERAPY INITIAL EVALUATION ADULT - DIAGNOSIS, OT EVAL
Patient brought to [facility] with R toe pain/wound for the past 6-7 weeks, heel weight bearing to RLE, presents with decreased overall strength, balance, postural control and activity tolerance impacting independence with functional activities and mobility.

## 2024-03-29 NOTE — PROGRESS NOTE ADULT - ASSESSMENT
Patient is a 67yo M wiht PMH of DM, diabetic wounds s/p left toe amputations, COPD (on home 2L, on BIPAP overnight), CKD (unknown baseline Cr), and colon cancer (w/ colostomy bag) who presents with right toe pain/wound for the past 6-7 weeks, admitted for osteomyelitis of R foot.

## 2024-03-29 NOTE — PROGRESS NOTE ADULT - ASSESSMENT
Initial Consult: 69 y/o M pmh PVD, DM, COPD presents for right foot wound and cellulitis. At time of consult, VSS, WBC 10.66, CRP 43.3, ESR 85. R foot x-ray wet read with cortical erosions of 5th metatarsal head concerning for osteomyelitis. High suspicion for Osteomyelitis of Right foot.     Need for amputation is non emergent and can be performed outpatient.     Plan:   - C/w IV abx  - Please order PICC before DC  - Appreciate ID reccs  - Please provide medical clearance for outpatient surgery  - Local wound care: wounds cleansed with normal saline. Betadine DSD applied to Right foot wound and WTD dressing applied to R foot wound.   - Pt should heel WBAT to RLE  - Rest of care per primary team    Plan d/w Attending. Podiatry following.    Initial Consult: 67 y/o M pmh PVD, DM, COPD presents for right foot wound and cellulitis. At time of consult, VSS, WBC 10.66, CRP 43.3, ESR 85. R foot x-ray wet read with cortical erosions of 5th metatarsal head concerning for osteomyelitis. High suspicion for Osteomyelitis of Right foot.     Need for amputation is non emergent and can be performed outpatient.     Plan:   - C/w IV abx  - Please order PICC before DC  - Appreciate ID reccs  - Please provide medical clearance for outpatient surgery  - Local wound care: wounds cleansed with normal saline. Betadine DSD applied to Right foot wound and WTD dressing applied to R foot wound.   - Pt should heel WBAT to RLE  - Rest of care per primary team    Plan d/w Attending. Podiatry following.     Discharge dressing instructions: Cleanse wound with normal sailine daily, pat dry with sterile guaze, apply  betadine soaked gauze to wound base, cover with dry sterile gauze, ABD pad, wrap with Kerlix and light ACE bandage.   Pt can follow-up on outpatient basis w/ Dr. Lindsey at the following facility:   [facility] ([facility])  [facility] Location   [address] [place] [address], [address]  [place], [place] [id number] [phone]; [phone]  [email or web address]

## 2024-03-29 NOTE — OCCUPATIONAL THERAPY INITIAL EVALUATION ADULT - NSOTDISCHREC_GEN_A_CORE
SOURAV vs HOT pending further functional assessment 2/2 decline to sit<>stand at this time- TREE Kwon made aware of d/c recommendations

## 2024-03-29 NOTE — PROGRESS NOTE ADULT - SUBJECTIVE AND OBJECTIVE BOX
OVERNIGHT EVENTS:    SUBJECTIVE / INTERVAL HPI: Patient seen and examined at bedside.     VITAL SIGNS:  Vital Signs Last 24 Hrs  T(C): 36.6 (29 Mar 2024 13:42), Max: 36.6 (29 Mar 2024 09:26)  T(F): 97.9 (29 Mar 2024 13:38), Max: 97.9 (29 Mar 2024 09:26)  HR: 74 (29 Mar 2024 13:42) (68 - 75)  BP: 139/50 (29 Mar 2024 13:42) (128/65 - 156/83)  BP(mean): 71 (29 Mar 2024 13:42) (71 - 71)  RR: 18 (29 Mar 2024 13:42) (18 - 18)  SpO2: 100% (29 Mar 2024 13:42) (92% - 100%)    Parameters below as of 29 Mar 2024 13:42    O2 Flow (L/min): 2      PHYSICAL EXAM:    General: NAD  HEENT: NC/AT; PERRL, anicteric sclera; MMM  Neck: supple w/o palpable nodularity  Cardiovascular: +S1/S2; RRR  Respiratory: CTA B/L; no W/R/R  Gastrointestinal: soft, NT/ND; +BSx4  Extremities: WWP; no edema, clubbing or cyanosis  Vascular: 2+ radial, DP/PT pulses B/L  Neurological: AAOx3; no focal deficits    MEDICATIONS:  MEDICATIONS  (STANDING):  influenza  Vaccine (HIGH DOSE) 0.7 milliLiter(s) IntraMuscular once    MEDICATIONS  (PRN):      ALLERGIES:  Allergies    vancomycin (Unknown)    Intolerances        LABS:                        7.6    10.10 )-----------( 247      ( 29 Mar 2024 05:30 )             25.0     03-29    134<L>  |  107  |  72<H>  ----------------------------<  151<H>  3.8   |  15<L>  |  4.38<H>    Ca    8.7      29 Mar 2024 05:30  Phos  5.0     03-29  Mg     2.0     03-29    TPro  6.0  /  Alb  3.0<L>  /  TBili  <0.2  /  DBili  x   /  AST  18  /  ALT  14  /  AlkPhos  70  03-29    PT/INR - ( 29 Mar 2024 11:48 )   PT: 13.2 sec;   INR: 1.16          PTT - ( 29 Mar 2024 11:48 )  PTT:43.3 sec  Urinalysis Basic - ( 29 Mar 2024 05:30 )    Color: x / Appearance: x / SG: x / pH: x  Gluc: 151 mg/dL / Ketone: x  / Bili: x / Urobili: x   Blood: x / Protein: x / Nitrite: x   Leuk Esterase: x / RBC: x / WBC x   Sq Epi: x / Non Sq Epi: x / Bacteria: x      CAPILLARY BLOOD GLUCOSE      POCT Blood Glucose.: 112 mg/dL (29 Mar 2024 13:35)      RADIOLOGY & ADDITIONAL TESTS: Reviewed.   OVERNIGHT EVENTS: MARIPOSA    SUBJECTIVE / INTERVAL HPI: Patient seen and examined at bedside. Pt states he is having some headache in the morning and intermittent pain in his foot. Denies fever, chills, abd pain, n/v.     VITAL SIGNS:  Vital Signs Last 24 Hrs  T(C): 36.6 (29 Mar 2024 13:42), Max: 36.6 (29 Mar 2024 09:26)  T(F): 97.9 (29 Mar 2024 13:38), Max: 97.9 (29 Mar 2024 09:26)  HR: 74 (29 Mar 2024 13:42) (68 - 75)  BP: 139/50 (29 Mar 2024 13:42) (128/65 - 156/83)  BP(mean): 71 (29 Mar 2024 13:42) (71 - 71)  RR: 18 (29 Mar 2024 13:42) (18 - 18)  SpO2: 100% (29 Mar 2024 13:42) (92% - 100%)    Parameters below as of 29 Mar 2024 13:42    O2 Flow (L/min): 2      PHYSICAL EXAM:    Constitutional: NAD  Head: NC/AT  Eyes: PERRL, EOMI, anicteric sclera  ENT: no nasal discharge; MMM  Neck: supple; no JVD or thyromegaly  Respiratory: CTA B/L; no W/R/R, no retractions; on 2L NC  Cardiac: +S1/S2; RRR; no M/R/G  Gastrointestinal: abdomen soft, +ostomy bag draining billious stool    Extremities: WWP, no clubbing or cyanosis; no peripheral edema.   -per podiatry exam: erythema around Rlateral foot wound extending proximally to met base and dorsally to 5th metatarsal; R lateral 5th metatarsal head with 3 x 2 cm wound with overlying eschar, neg PTB; L distal hallux with 0.7 x 0.5 cm superficial wound with granular base. S/p 2-5 ray amputations on Left  Vascular: 2+ radial, non-palpable DP/PT pulses B/L (pulses dopplerable)  Neurologic: AAOx3;      MEDICATIONS:  MEDICATIONS  (STANDING):  influenza  Vaccine (HIGH DOSE) 0.7 milliLiter(s) IntraMuscular once    MEDICATIONS  (PRN):      ALLERGIES:  Allergies    vancomycin (Unknown)    Intolerances        LABS:                        7.6    10.10 )-----------( 247      ( 29 Mar 2024 05:30 )             25.0     03-29    134<L>  |  107  |  72<H>  ----------------------------<  151<H>  3.8   |  15<L>  |  4.38<H>    Ca    8.7      29 Mar 2024 05:30  Phos  5.0     03-29  Mg     2.0     03-29    TPro  6.0  /  Alb  3.0<L>  /  TBili  <0.2  /  DBili  x   /  AST  18  /  ALT  14  /  AlkPhos  70  03-29    PT/INR - ( 29 Mar 2024 11:48 )   PT: 13.2 sec;   INR: 1.16          PTT - ( 29 Mar 2024 11:48 )  PTT:43.3 sec  Urinalysis Basic - ( 29 Mar 2024 05:30 )    Color: x / Appearance: x / SG: x / pH: x  Gluc: 151 mg/dL / Ketone: x  / Bili: x / Urobili: x   Blood: x / Protein: x / Nitrite: x   Leuk Esterase: x / RBC: x / WBC x   Sq Epi: x / Non Sq Epi: x / Bacteria: x      CAPILLARY BLOOD GLUCOSE      POCT Blood Glucose.: 112 mg/dL (29 Mar 2024 13:35)      RADIOLOGY & ADDITIONAL TESTS: Reviewed.

## 2024-03-29 NOTE — OCCUPATIONAL THERAPY INITIAL EVALUATION ADULT - PERTINENT HX OF CURRENT PROBLEM, REHAB EVAL
Patient is a 67yo M with PMH of Chronic fib, DM, diabetic wounds s/p left toe amputations, COPD (on home 2L, on BIPAP overnight), Stage 5 CKD (baseline Cr 3.9), ?CAD, PAD, and colon cancer (w/ colostomy bag) who presents with right toe pain/wound for the past 6-7 weeks. Reportedly patient had outpatient XR of the foot 3 days ago which were concerning for "bone infection". States he was initially seen by his vascular surgeon, who "ballooned the veins" in his leg to improve blood flow, no stents placed at the time. Has been on doxycycline 100mg bid outpatient for the past 2 weeks without improvement. A few days ago, he developed worsening pain on his right foot with erythema and blistering. Describes intense pain and inability to bear weight on the right foot which brought him in to the ED. Of note, patient reports an allergy to IV antibiotic that causes angioedema and some difficulty breathing, does not recall whether it was vancomycin or daptomycin. Reports he recently switched nephrologists and was started on sodium bicarbonate; per son at bedside, baseline Cr is 4.02. Makes urine. Additionally states he "needs stents " for his heart, but does not currently have any. Uses NC 2L at baseline, CPAP at night. No other complaints at this time.

## 2024-03-30 LAB
ALBUMIN SERPL ELPH-MCNC: 3.1 G/DL — LOW (ref 3.3–5)
ALP SERPL-CCNC: 61 U/L — SIGNIFICANT CHANGE UP (ref 40–120)
ALT FLD-CCNC: 16 U/L — SIGNIFICANT CHANGE UP (ref 10–45)
ANION GAP SERPL CALC-SCNC: 11 MMOL/L — SIGNIFICANT CHANGE UP (ref 5–17)
AST SERPL-CCNC: 37 U/L — SIGNIFICANT CHANGE UP (ref 10–40)
BASOPHILS # BLD AUTO: 0.03 K/UL — SIGNIFICANT CHANGE UP (ref 0–0.2)
BASOPHILS NFR BLD AUTO: 0.3 % — SIGNIFICANT CHANGE UP (ref 0–2)
BILIRUB SERPL-MCNC: 0.2 MG/DL — SIGNIFICANT CHANGE UP (ref 0.2–1.2)
BUN SERPL-MCNC: 68 MG/DL — HIGH (ref 7–23)
CALCIUM SERPL-MCNC: 8.8 MG/DL — SIGNIFICANT CHANGE UP (ref 8.4–10.5)
CHLORIDE SERPL-SCNC: 108 MMOL/L — SIGNIFICANT CHANGE UP (ref 96–108)
CO2 SERPL-SCNC: 17 MMOL/L — LOW (ref 22–31)
CREAT SERPL-MCNC: 4.68 MG/DL — HIGH (ref 0.5–1.3)
EGFR: 13 ML/MIN/1.73M2 — LOW
EOSINOPHIL # BLD AUTO: 0.15 K/UL — SIGNIFICANT CHANGE UP (ref 0–0.5)
EOSINOPHIL NFR BLD AUTO: 1.4 % — SIGNIFICANT CHANGE UP (ref 0–6)
GLUCOSE BLDC GLUCOMTR-MCNC: 104 MG/DL — HIGH (ref 70–99)
GLUCOSE BLDC GLUCOMTR-MCNC: 109 MG/DL — HIGH (ref 70–99)
GLUCOSE BLDC GLUCOMTR-MCNC: 145 MG/DL — HIGH (ref 70–99)
GLUCOSE BLDC GLUCOMTR-MCNC: 189 MG/DL — HIGH (ref 70–99)
GLUCOSE SERPL-MCNC: 142 MG/DL — HIGH (ref 70–99)
GRAM STN FLD: ABNORMAL
HCT VFR BLD CALC: 23.1 % — LOW (ref 39–50)
HGB BLD-MCNC: 7.2 G/DL — LOW (ref 13–17)
IMM GRANULOCYTES NFR BLD AUTO: 0.5 % — SIGNIFICANT CHANGE UP (ref 0–0.9)
LYMPHOCYTES # BLD AUTO: 2.44 K/UL — SIGNIFICANT CHANGE UP (ref 1–3.3)
LYMPHOCYTES # BLD AUTO: 23.4 % — SIGNIFICANT CHANGE UP (ref 13–44)
MAGNESIUM SERPL-MCNC: 1.9 MG/DL — SIGNIFICANT CHANGE UP (ref 1.6–2.6)
MCHC RBC-ENTMCNC: 28 PG — SIGNIFICANT CHANGE UP (ref 27–34)
MCHC RBC-ENTMCNC: 31.2 GM/DL — LOW (ref 32–36)
MCV RBC AUTO: 89.9 FL — SIGNIFICANT CHANGE UP (ref 80–100)
MONOCYTES # BLD AUTO: 1.24 K/UL — HIGH (ref 0–0.9)
MONOCYTES NFR BLD AUTO: 11.9 % — SIGNIFICANT CHANGE UP (ref 2–14)
NEUTROPHILS # BLD AUTO: 6.51 K/UL — SIGNIFICANT CHANGE UP (ref 1.8–7.4)
NEUTROPHILS NFR BLD AUTO: 62.5 % — SIGNIFICANT CHANGE UP (ref 43–77)
NRBC # BLD: 0 /100 WBCS — SIGNIFICANT CHANGE UP (ref 0–0)
PHOSPHATE SERPL-MCNC: 4.5 MG/DL — SIGNIFICANT CHANGE UP (ref 2.5–4.5)
PLATELET # BLD AUTO: 225 K/UL — SIGNIFICANT CHANGE UP (ref 150–400)
POTASSIUM SERPL-MCNC: 3.9 MMOL/L — SIGNIFICANT CHANGE UP (ref 3.5–5.3)
POTASSIUM SERPL-SCNC: 3.9 MMOL/L — SIGNIFICANT CHANGE UP (ref 3.5–5.3)
PROT SERPL-MCNC: 5.8 G/DL — LOW (ref 6–8.3)
RBC # BLD: 2.57 M/UL — LOW (ref 4.2–5.8)
RBC # FLD: 14.4 % — SIGNIFICANT CHANGE UP (ref 10.3–14.5)
SODIUM SERPL-SCNC: 136 MMOL/L — SIGNIFICANT CHANGE UP (ref 135–145)
WBC # BLD: 10.42 K/UL — SIGNIFICANT CHANGE UP (ref 3.8–10.5)
WBC # FLD AUTO: 10.42 K/UL — SIGNIFICANT CHANGE UP (ref 3.8–10.5)

## 2024-03-30 PROCEDURE — 99232 SBSQ HOSP IP/OBS MODERATE 35: CPT

## 2024-03-30 PROCEDURE — 99232 SBSQ HOSP IP/OBS MODERATE 35: CPT | Mod: GC

## 2024-03-30 RX ORDER — APIXABAN 2.5 MG/1
5 TABLET, FILM COATED ORAL EVERY 12 HOURS
Refills: 0 | Status: DISCONTINUED | OUTPATIENT
Start: 2024-03-30 | End: 2024-04-01

## 2024-03-30 RX ORDER — OXYCODONE HYDROCHLORIDE 5 MG/1
7.5 TABLET ORAL EVERY 6 HOURS
Refills: 0 | Status: DISCONTINUED | OUTPATIENT
Start: 2024-03-30 | End: 2024-04-03

## 2024-03-30 RX ORDER — GABAPENTIN 400 MG/1
100 CAPSULE ORAL DAILY
Refills: 0 | Status: DISCONTINUED | OUTPATIENT
Start: 2024-03-30 | End: 2024-04-01

## 2024-03-30 RX ORDER — PIPERACILLIN AND TAZOBACTAM 4; .5 G/20ML; G/20ML
4.5 INJECTION, POWDER, LYOPHILIZED, FOR SOLUTION INTRAVENOUS EVERY 12 HOURS
Refills: 0 | Status: DISCONTINUED | OUTPATIENT
Start: 2024-03-30 | End: 2024-04-03

## 2024-03-30 RX ADMIN — CARVEDILOL PHOSPHATE 6.25 MILLIGRAM(S): 80 CAPSULE, EXTENDED RELEASE ORAL at 06:59

## 2024-03-30 RX ADMIN — APIXABAN 5 MILLIGRAM(S): 2.5 TABLET, FILM COATED ORAL at 07:00

## 2024-03-30 RX ADMIN — APIXABAN 5 MILLIGRAM(S): 2.5 TABLET, FILM COATED ORAL at 18:31

## 2024-03-30 RX ADMIN — Medication 975 MILLIGRAM(S): at 13:01

## 2024-03-30 RX ADMIN — Medication 975 MILLIGRAM(S): at 22:55

## 2024-03-30 RX ADMIN — PIPERACILLIN AND TAZOBACTAM 25 GRAM(S): 4; .5 INJECTION, POWDER, LYOPHILIZED, FOR SOLUTION INTRAVENOUS at 11:38

## 2024-03-30 RX ADMIN — Medication 975 MILLIGRAM(S): at 23:55

## 2024-03-30 RX ADMIN — PIPERACILLIN AND TAZOBACTAM 25 GRAM(S): 4; .5 INJECTION, POWDER, LYOPHILIZED, FOR SOLUTION INTRAVENOUS at 03:42

## 2024-03-30 RX ADMIN — OXYCODONE HYDROCHLORIDE 5 MILLIGRAM(S): 5 TABLET ORAL at 13:01

## 2024-03-30 RX ADMIN — LINEZOLID 600 MILLIGRAM(S): 600 INJECTION, SOLUTION INTRAVENOUS at 18:31

## 2024-03-30 RX ADMIN — ATORVASTATIN CALCIUM 80 MILLIGRAM(S): 80 TABLET, FILM COATED ORAL at 22:55

## 2024-03-30 RX ADMIN — OXYCODONE HYDROCHLORIDE 5 MILLIGRAM(S): 5 TABLET ORAL at 14:01

## 2024-03-30 RX ADMIN — Medication 975 MILLIGRAM(S): at 14:01

## 2024-03-30 RX ADMIN — LINEZOLID 600 MILLIGRAM(S): 600 INJECTION, SOLUTION INTRAVENOUS at 06:59

## 2024-03-30 RX ADMIN — Medication 650 MILLIGRAM(S): at 09:42

## 2024-03-30 RX ADMIN — Medication 650 MILLIGRAM(S): at 22:56

## 2024-03-30 RX ADMIN — Medication 975 MILLIGRAM(S): at 07:00

## 2024-03-30 RX ADMIN — CLOPIDOGREL BISULFATE 75 MILLIGRAM(S): 75 TABLET, FILM COATED ORAL at 00:36

## 2024-03-30 RX ADMIN — GABAPENTIN 100 MILLIGRAM(S): 400 CAPSULE ORAL at 18:31

## 2024-03-30 NOTE — PROGRESS NOTE ADULT - ASSESSMENT
69 yo M w/ CKD4 (BCr 3.8-4.2 since 6/2023) admitted for R foot OM, on zosyn/linezolid, now pending amputation. Kidney function close to baseline, slight variation in Cr may be hemodynamic due to relative changes in BP.  Pt also with hx of PAD, COPD on home O2, s/p colectomy with colostomy for colon ca      Imp: CKD at baseline.   Met acidosis due to above  Cr 4.68 today  Cystatin C 4.3, GFR by cystatin C 11 3/28  HCO3 17  CPK wnl      Plan:  Obtain UA, U. Na and UPCR.   Increase sodium bicarb to 2 tabs BID  Avoid hypotension, maintain SBP>120 for renal perfusion  Encourage PO intake. If inadequate, consider gentle IV hydration  Strict I&O  Renal diet  Cont. renally dosed medications  T. sat 19, holding IV Fe supplement iso active Osteomyelitis. Start Epo 10K units weekly or 20K units every 2 weeks

## 2024-03-30 NOTE — PROGRESS NOTE ADULT - PROBLEM SELECTOR PLAN 1
Patient c/o right foot pain x 6-7 weeks.  Hx of diabetic foot infection with prior left toe amputation. On doxycycline outpatient x 2 weeks without improvement. Follows with vascular at [facility] for PAD.  On podiatry exam, erythema around R lateral foot wound extending proximally to met base and dorsally to 5th metatarsal; Right lateral 5th metatarsal head with 3 x 2 cm wound with overlying eschar, neg PTB or fluctuance  Labs significant for elevated ESR/CRP, mild leukocytosis.  Podiatry consulted. Given zosyn 3.375g x 1 in the ED. Concern for prior allergy to vancomycin vs Daptomycin?   - XR R foot: No convincing plain radiographic evidence for acute osteomyelitis.  - XR L foot : No tracking gas collections or gross radiographic evidence for osteomyelitis however if concern persists MRI suggested to further assess.  - CT R foot: No CT evidence of osteomyelitis. However, MRI would be a more sensitive test to evaluate for osteomyelitis.  - MRI of R foot (3/26): Osteomyelitis of the fifth metatarsal head and fifth digit proximal phalanx with adjacent soft tissue ulceration and phlegmon.  - hold off on vancomycin or dapto given unclear allergy history and patient stable   - BCx + gram + cocci, Staph Epi.   - F/U Surveillance blood cultures- NTD  - ID consulted, f/u recs   - f/u podiatry recs;  - S/P right toe amputation 3/29. Surgical margins appear to be + for Pseudomonas  - c/w zosyn w/ pseudomonal dosing and Linezolid 600 mg q12

## 2024-03-30 NOTE — PROGRESS NOTE ADULT - ASSESSMENT
68M h/o DM p/w R foot OM.  now s/p partial 5th ray amputation on 3/29.  OR culture growing PsA.    - increase zosyn to 4.5g IV q8h over 4h  - cont linezolid 600mg PO q12h   - f/u OR culture    Team 2 will follow you.  Dr Archer will resume care on Monday.  Case d/w primary team.    Kala Blanton MD, MS  Infectious Disease attending  office phone [phone]  For any questions during evening/weekend/holiday, please page ID on call

## 2024-03-30 NOTE — PROGRESS NOTE ADULT - ASSESSMENT
67 y/o M pmh PVD, DM, COPD presents for right foot wound and cellulitis. Confirmed on MRI Osteomyelitis of Right 5th proximal phalanx and 5th metatarsal head now S/P right partial 5th ray amputation.     Plan:   - C/w IV abx  - F/U Deep dirty cx swabs x 2  - F/U Clean margin 5th metatarsal bone cx and pathology   - Pt should be Heel WBAT to RLE  - Rest of care per primary team    Plan d/w Attending. Podiatry following.

## 2024-03-30 NOTE — PROGRESS NOTE ADULT - SUBJECTIVE AND OBJECTIVE BOX
INFECTIOUS DISEASES CONSULT FOLLOW-UP NOTE    INTERVAL HPI/OVERNIGHT EVENTS:  no event overnight  patient reports foot pain     ROS:   Constitutional, eyes, ENT, cardiovascular, respiratory, gastrointestinal, genitourinary, integumentary, neurological, psychiatric and heme/lymph are otherwise negative other than noted above       ANTIBIOTICS/RELEVANT:    MEDICATIONS  (STANDING):  acetaminophen     Tablet .. 975 milliGRAM(s) Oral every 8 hours  apixaban 5 milliGRAM(s) Oral every 12 hours  atorvastatin 80 milliGRAM(s) Oral at bedtime  carvedilol 6.25 milliGRAM(s) Oral every 12 hours  clopidogrel Tablet 75 milliGRAM(s) Oral daily  dextrose 5%. 1000 milliLiter(s) (50 mL/Hr) IV Continuous <Continuous>  dextrose 5%. 1000 milliLiter(s) (100 mL/Hr) IV Continuous <Continuous>  dextrose 50% Injectable 25 Gram(s) IV Push once  dextrose 50% Injectable 12.5 Gram(s) IV Push once  dextrose 50% Injectable 25 Gram(s) IV Push once  glucagon  Injectable 1 milliGRAM(s) IntraMuscular once  influenza  Vaccine (HIGH DOSE) 0.7 milliLiter(s) IntraMuscular once  insulin lispro (ADMELOG) corrective regimen sliding scale   SubCutaneous at bedtime  insulin lispro (ADMELOG) corrective regimen sliding scale   SubCutaneous three times a day before meals  linezolid    Tablet 600 milliGRAM(s) Oral every 12 hours  piperacillin/tazobactam IVPB.. 4.5 Gram(s) IV Intermittent every 12 hours  sertraline 50 milliGRAM(s) Oral every 24 hours  sodium bicarbonate 650 milliGRAM(s) Oral every 12 hours    MEDICATIONS  (PRN):  dextrose Oral Gel 15 Gram(s) Oral once PRN Blood Glucose LESS THAN 70 milliGRAM(s)/deciliter  oxyCODONE    IR 10 milliGRAM(s) Oral every 6 hours PRN Severe Pain (7 - 10)  oxyCODONE    IR 5 milliGRAM(s) Oral every 6 hours PRN Moderate Pain (4 - 6)        Vital Signs Last 24 Hrs  T(C): 36.8 (30 Mar 2024 09:13), Max: 36.8 (30 Mar 2024 09:13)  T(F): 98.3 (30 Mar 2024 09:13), Max: 98.3 (30 Mar 2024 09:13)  HR: 79 (30 Mar 2024 09:13) (78 - 96)  BP: 145/70 (30 Mar 2024 09:13) (92/49 - 148/72)  BP(mean): 98 (29 Mar 2024 20:56) (67 - 98)  RR: 18 (30 Mar 2024 09:13) (17 - 23)  SpO2: 95% (30 Mar 2024 09:13) (95% - 100%)    Parameters below as of 30 Mar 2024 09:25  Patient On (Oxygen Delivery Method): room air        03-29-24 @ 07:01  -  03-30-24 @ 07:00  --------------------------------------------------------  IN: 200 mL / OUT: 170 mL / NET: 30 mL      PHYSICAL EXAM:  Constitutional: alert, NAD  Eyes: the sclera and conjunctiva were normal.   ENT: the ears and nose were normal in appearance.   Neck: the appearance of the neck was normal and the neck was supple.   Pulmonary: no respiratory distress and lungs were clear to auscultation bilaterally.   Heart: heart rate was normal and rhythm regular, normal S1 and S2  Vascular:. there was no peripheral edema  Abdomen: normal bowel sounds, soft, non-tender  R foot covered with dressing         LABS:                        7.2    10.42 )-----------( 225      ( 30 Mar 2024 05:30 )             23.1     03-30    136  |  108  |  68<H>  ----------------------------<  142<H>  3.9   |  17<L>  |  4.68<H>    Ca    8.8      30 Mar 2024 05:30  Phos  4.5     03-30  Mg     1.9     03-30    TPro  5.8<L>  /  Alb  3.1<L>  /  TBili  0.2  /  DBili  x   /  AST  37  /  ALT  16  /  AlkPhos  61  03-30    PT/INR - ( 29 Mar 2024 11:48 )   PT: 13.2 sec;   INR: 1.16          PTT - ( 29 Mar 2024 11:48 )  PTT:43.3 sec  Urinalysis Basic - ( 30 Mar 2024 05:30 )    Color: x / Appearance: x / SG: x / pH: x  Gluc: 142 mg/dL / Ketone: x  / Bili: x / Urobili: x   Blood: x / Protein: x / Nitrite: x   Leuk Esterase: x / RBC: x / WBC x   Sq Epi: x / Non Sq Epi: x / Bacteria: x        MICROBIOLOGY:      RADIOLOGY & ADDITIONAL STUDIES:  Reviewed

## 2024-03-30 NOTE — PROGRESS NOTE ADULT - SUBJECTIVE AND OBJECTIVE BOX
OVERNIGHT EVENTS: s/p OR    SUBJECTIVE / INTERVAL HPI: Patient seen and examined at bedside. Pt reports continued pain at his right foot. Denies SOB, CP. ROS is otherwise negative.       Vital Signs Last 24 Hrs  T(C): 36.3 (30 Mar 2024 16:18), Max: 36.8 (30 Mar 2024 09:13)  T(F): 97.3 (30 Mar 2024 16:18), Max: 98.3 (30 Mar 2024 09:13)  HR: 68 (30 Mar 2024 16:18) (68 - 96)  BP: 124/68 (30 Mar 2024 16:18) (119/57 - 148/72)  BP(mean): 98 (29 Mar 2024 20:56) (82 - 98)  RR: 18 (30 Mar 2024 16:18) (17 - 19)  SpO2: 100% (30 Mar 2024 16:18) (95% - 100%)    Parameters below as of 30 Mar 2024 16:18  Patient On (Oxygen Delivery Method): BiPAP/CPAP        O2 Flow (L/min): 2      PHYSICAL EXAM:    Constitutional: NAD  Head: NC/AT  Eyes: PERRL, EOMI, anicteric sclera  ENT: no nasal discharge; MMM  Neck: supple; no JVD or thyromegaly  Respiratory: CTA B/L; no W/R/R, no retractions; on 2L NC  Cardiac: +S1/S2; RRR; no M/R/G  Gastrointestinal: abdomen soft, +ostomy bag draining billious stool    Extremities: WWP, no clubbing or cyanosis; no peripheral edema.   -per podiatry exam: erythema around Rlateral foot wound extending proximally to met base and dorsally to 5th metatarsal; R lateral 5th metatarsal head with 3 x 2 cm wound with overlying eschar, neg PTB; L distal hallux with 0.7 x 0.5 cm superficial wound with granular base. S/p 2-5 ray amputations on Left  Vascular: 2+ radial, non-palpable DP/PT pulses B/L (pulses dopplerable)  Neurologic: AAOx3;  Psych: normal affect          MEDICATIONS  (STANDING):  acetaminophen     Tablet .. 975 milliGRAM(s) Oral every 8 hours  apixaban 5 milliGRAM(s) Oral every 12 hours  atorvastatin 80 milliGRAM(s) Oral at bedtime  carvedilol 6.25 milliGRAM(s) Oral every 12 hours  clopidogrel Tablet 75 milliGRAM(s) Oral daily  dextrose 5%. 1000 milliLiter(s) (100 mL/Hr) IV Continuous <Continuous>  dextrose 5%. 1000 milliLiter(s) (50 mL/Hr) IV Continuous <Continuous>  dextrose 50% Injectable 25 Gram(s) IV Push once  dextrose 50% Injectable 25 Gram(s) IV Push once  dextrose 50% Injectable 12.5 Gram(s) IV Push once  gabapentin 100 milliGRAM(s) Oral daily  glucagon  Injectable 1 milliGRAM(s) IntraMuscular once  influenza  Vaccine (HIGH DOSE) 0.7 milliLiter(s) IntraMuscular once  insulin lispro (ADMELOG) corrective regimen sliding scale   SubCutaneous three times a day before meals  insulin lispro (ADMELOG) corrective regimen sliding scale   SubCutaneous at bedtime  linezolid    Tablet 600 milliGRAM(s) Oral every 12 hours  piperacillin/tazobactam IVPB.. 4.5 Gram(s) IV Intermittent every 12 hours  sertraline 50 milliGRAM(s) Oral every 24 hours  sodium bicarbonate 650 milliGRAM(s) Oral every 12 hours    MEDICATIONS  (PRN):  dextrose Oral Gel 15 Gram(s) Oral once PRN Blood Glucose LESS THAN 70 milliGRAM(s)/deciliter  oxyCODONE    IR 10 milliGRAM(s) Oral every 6 hours PRN Severe Pain (7 - 10)  oxyCODONE    IR 5 milliGRAM(s) Oral every 6 hours PRN Moderate Pain (4 - 6)            ALLERGIES:  Allergies    vancomycin (Unknown)    Intolerances        LABS:                        7.2    10.42 )-----------( 225      ( 30 Mar 2024 05:30 )             23.1   03-30    136  |  108  |  68<H>  ----------------------------<  142<H>  3.9   |  17<L>  |  4.68<H>    Ca    8.8      30 Mar 2024 05:30  Phos  4.5     03-30  Mg     1.9     03-30    TPro  5.8<L>  /  Alb  3.1<L>  /  TBili  0.2  /  DBili  x   /  AST  37  /  ALT  16  /  AlkPhos  61  03-30            POCT Blood Glucose.: 112 mg/dL (29 Mar 2024 13:35)      RADIOLOGY & ADDITIONAL TESTS: Reviewed.

## 2024-03-30 NOTE — PROGRESS NOTE ADULT - SUBJECTIVE AND OBJECTIVE BOX
Patient is a 68y Male seen and evaluated at bedside. Laying in bed.  Concerned about pain in right foot postop.  Denies any other symptoms.      Meds:    acetaminophen     Tablet .. 975 every 8 hours  apixaban 5 every 12 hours  atorvastatin 80 at bedtime  carvedilol 6.25 every 12 hours  clopidogrel Tablet 75 daily  dextrose 5%. 1000 <Continuous>  dextrose 5%. 1000 <Continuous>  dextrose 50% Injectable 25 once  dextrose 50% Injectable 25 once  dextrose 50% Injectable 12.5 once  dextrose Oral Gel 15 once PRN  glucagon  Injectable 1 once  influenza  Vaccine (HIGH DOSE) 0.7 once  insulin lispro (ADMELOG) corrective regimen sliding scale  three times a day before meals  insulin lispro (ADMELOG) corrective regimen sliding scale  at bedtime  linezolid    Tablet 600 every 12 hours  oxyCODONE    IR 5 every 6 hours PRN  oxyCODONE    IR 10 every 6 hours PRN  piperacillin/tazobactam IVPB.. 4.5 every 12 hours  sertraline 50 every 24 hours  sodium bicarbonate 650 every 12 hours      T(C): , Max: 36.8 (03-30-24 @ 09:13)  T(F): , Max: 98.3 (03-30-24 @ 09:13)  HR: 79 (03-30-24 @ 09:13)  BP: 145/70 (03-30-24 @ 09:13)  BP(mean): 98 (03-29-24 @ 20:56)  RR: 18 (03-30-24 @ 09:13)  SpO2: 95% (03-30-24 @ 09:13)  Wt(kg): --    03-29 @ 07:01  -  03-30 @ 07:00  --------------------------------------------------------  IN: 200 mL / OUT: 170 mL / NET: 30 mL          Review of Systems:  ROS negative except as per HPI      PHYSICAL EXAM:  General: NAD  Respiratory: CTA B/L; on RA  Cardiac: RRR; no M/R/G  Gastrointestinal: abdomen soft, NT/ND; no rebound or guarding  Extremities: no peripheral edema, R foot post-op dressing  Neurologic: AAOx3; no focal deficits      LABS:                        7.2    10.42 )-----------( 225      ( 30 Mar 2024 05:30 )             23.1     03-30    136  |  108  |  68<H>  ----------------------------<  142<H>  3.9   |  17<L>  |  4.68<H>    Ca    8.8      30 Mar 2024 05:30  Phos  4.5     03-30  Mg     1.9     03-30    TPro  5.8<L>  /  Alb  3.1<L>  /  TBili  0.2  /  DBili  x   /  AST  37  /  ALT  16  /  AlkPhos  61  03-30      PT/INR - ( 29 Mar 2024 11:48 )   PT: 13.2 sec;   INR: 1.16          PTT - ( 29 Mar 2024 11:48 )  PTT:43.3 sec  Urinalysis Basic - ( 30 Mar 2024 05:30 )    Color: x / Appearance: x / SG: x / pH: x  Gluc: 142 mg/dL / Ketone: x  / Bili: x / Urobili: x   Blood: x / Protein: x / Nitrite: x   Leuk Esterase: x / RBC: x / WBC x   Sq Epi: x / Non Sq Epi: x / Bacteria: x            RADIOLOGY & ADDITIONAL STUDIES:

## 2024-03-30 NOTE — PROGRESS NOTE ADULT - SUBJECTIVE AND OBJECTIVE BOX
Patient is a 68y old  Male who presents with a chief complaint of osteomyelitis of foot (29 Mar 2024 17:33)      INTERVAL HPI/ OVERNIGHT EVENTS      LABS                        7.2    10.42 )-----------( 225      ( 30 Mar 2024 05:30 )             23.1     03-30    136  |  108  |  68<H>  ----------------------------<  142<H>  3.9   |  17<L>  |  4.68<H>    Ca    8.8      30 Mar 2024 05:30  Phos  4.5     03-30  Mg     1.9     03-30    TPro  5.8<L>  /  Alb  3.1<L>  /  TBili  0.2  /  DBili  x   /  AST  37  /  ALT  16  /  AlkPhos  61  03-30    PT/INR - ( 29 Mar 2024 11:48 )   PT: 13.2 sec;   INR: 1.16          PTT - ( 29 Mar 2024 11:48 )  PTT:43.3 sec    ICU Vital Signs Last 24 Hrs  T(C): 36.7 (30 Mar 2024 06:00), Max: 36.7 (30 Mar 2024 06:00)  T(F): 98 (30 Mar 2024 06:00), Max: 98 (30 Mar 2024 06:00)  HR: 78 (30 Mar 2024 06:00) (74 - 96)  BP: 131/65 (30 Mar 2024 06:00) (92/49 - 148/72)  BP(mean): 98 (29 Mar 2024 20:56) (67 - 98)  ABP: --  ABP(mean): --  RR: 19 (30 Mar 2024 06:00) (17 - 23)  SpO2: 100% (30 Mar 2024 06:00) (100% - 100%)    O2 Parameters below as of 29 Mar 2024 20:56  Patient On (Oxygen Delivery Method): nasal cannula  O2 Flow (L/min): 2          RADIOLOGY    MICROBIOLOGY    PHYSICAL EXAM  Vasc: PT/DP non-palpable b/l -bipasic waveforms heard at b/l DP and monophasic waveforms heard at b/l PT; Erythema around right lateral foot wound extending proximally to met base and dorsally to 5th metatarsal  Derm: Surgical site sutures intact with no obvious cardinal signs of active infection or drainage  Neuro: protective sensation in tact  MSK: s/p 2-5 ray amputations on Left, s/p partial 5th ray amputation Right Patient is a 68y old  Male who presents with a chief complaint of osteomyelitis of foot (29 Mar 2024 17:33)      INTERVAL HPI/ OVERNIGHT EVENTS  Dressing c/d/i      LABS                        7.2    10.42 )-----------( 225      ( 30 Mar 2024 05:30 )             23.1     03-30    136  |  108  |  68<H>  ----------------------------<  142<H>  3.9   |  17<L>  |  4.68<H>    Ca    8.8      30 Mar 2024 05:30  Phos  4.5     03-30  Mg     1.9     03-30    TPro  5.8<L>  /  Alb  3.1<L>  /  TBili  0.2  /  DBili  x   /  AST  37  /  ALT  16  /  AlkPhos  61  03-30    PT/INR - ( 29 Mar 2024 11:48 )   PT: 13.2 sec;   INR: 1.16          PTT - ( 29 Mar 2024 11:48 )  PTT:43.3 sec    ICU Vital Signs Last 24 Hrs  T(C): 36.7 (30 Mar 2024 06:00), Max: 36.7 (30 Mar 2024 06:00)  T(F): 98 (30 Mar 2024 06:00), Max: 98 (30 Mar 2024 06:00)  HR: 78 (30 Mar 2024 06:00) (74 - 96)  BP: 131/65 (30 Mar 2024 06:00) (92/49 - 148/72)  BP(mean): 98 (29 Mar 2024 20:56) (67 - 98)  ABP: --  ABP(mean): --  RR: 19 (30 Mar 2024 06:00) (17 - 23)  SpO2: 100% (30 Mar 2024 06:00) (100% - 100%)    O2 Parameters below as of 29 Mar 2024 20:56  Patient On (Oxygen Delivery Method): nasal cannula  O2 Flow (L/min): 2          RADIOLOGY    MICROBIOLOGY    PHYSICAL EXAM  Vasc: PT/DP non-palpable b/l -bipasic waveforms heard at b/l DP and monophasic waveforms heard at b/l PT; Erythema around right lateral foot wound extending proximally to met base and dorsally to 5th metatarsal  Derm: Surgical site sutures intact with no obvious cardinal signs of active infection or drainage  Neuro: protective sensation in tact  MSK: s/p 2-5 ray amputations on Left, s/p partial 5th ray amputation Right

## 2024-03-31 LAB
-  AZTREONAM: SIGNIFICANT CHANGE UP
-  CEFEPIME: SIGNIFICANT CHANGE UP
-  CIPROFLOXACIN: SIGNIFICANT CHANGE UP
-  LEVOFLOXACIN: SIGNIFICANT CHANGE UP
-  PIPERACILLIN/TAZOBACTAM: SIGNIFICANT CHANGE UP
CULTURE RESULTS: SIGNIFICANT CHANGE UP
GLUCOSE BLDC GLUCOMTR-MCNC: 124 MG/DL — HIGH (ref 70–99)
GLUCOSE BLDC GLUCOMTR-MCNC: 124 MG/DL — HIGH (ref 70–99)
GLUCOSE BLDC GLUCOMTR-MCNC: 149 MG/DL — HIGH (ref 70–99)
GLUCOSE BLDC GLUCOMTR-MCNC: 185 MG/DL — HIGH (ref 70–99)
METHOD TYPE: SIGNIFICANT CHANGE UP
SPECIMEN SOURCE: SIGNIFICANT CHANGE UP

## 2024-03-31 PROCEDURE — 99232 SBSQ HOSP IP/OBS MODERATE 35: CPT

## 2024-03-31 PROCEDURE — 99232 SBSQ HOSP IP/OBS MODERATE 35: CPT | Mod: GC

## 2024-03-31 RX ORDER — ERYTHROPOIETIN 10000 [IU]/ML
10000 INJECTION, SOLUTION INTRAVENOUS; SUBCUTANEOUS
Refills: 0 | Status: DISCONTINUED | OUTPATIENT
Start: 2024-04-01 | End: 2024-04-03

## 2024-03-31 RX ORDER — SODIUM BICARBONATE 1 MEQ/ML
1300 SYRINGE (ML) INTRAVENOUS EVERY 12 HOURS
Refills: 0 | Status: DISCONTINUED | OUTPATIENT
Start: 2024-04-01 | End: 2024-04-03

## 2024-03-31 RX ORDER — SODIUM BICARBONATE 1 MEQ/ML
650 SYRINGE (ML) INTRAVENOUS EVERY 12 HOURS
Refills: 0 | Status: DISCONTINUED | OUTPATIENT
Start: 2024-03-31 | End: 2024-03-31

## 2024-03-31 RX ADMIN — LINEZOLID 600 MILLIGRAM(S): 600 INJECTION, SOLUTION INTRAVENOUS at 18:52

## 2024-03-31 RX ADMIN — Medication 2: at 18:51

## 2024-03-31 RX ADMIN — PIPERACILLIN AND TAZOBACTAM 25 GRAM(S): 4; .5 INJECTION, POWDER, LYOPHILIZED, FOR SOLUTION INTRAVENOUS at 23:59

## 2024-03-31 RX ADMIN — Medication 975 MILLIGRAM(S): at 14:44

## 2024-03-31 RX ADMIN — APIXABAN 5 MILLIGRAM(S): 2.5 TABLET, FILM COATED ORAL at 18:52

## 2024-03-31 RX ADMIN — OXYCODONE HYDROCHLORIDE 5 MILLIGRAM(S): 5 TABLET ORAL at 19:50

## 2024-03-31 RX ADMIN — LINEZOLID 600 MILLIGRAM(S): 600 INJECTION, SOLUTION INTRAVENOUS at 07:03

## 2024-03-31 RX ADMIN — PIPERACILLIN AND TAZOBACTAM 25 GRAM(S): 4; .5 INJECTION, POWDER, LYOPHILIZED, FOR SOLUTION INTRAVENOUS at 15:20

## 2024-03-31 RX ADMIN — OXYCODONE HYDROCHLORIDE 5 MILLIGRAM(S): 5 TABLET ORAL at 19:00

## 2024-03-31 RX ADMIN — APIXABAN 5 MILLIGRAM(S): 2.5 TABLET, FILM COATED ORAL at 07:04

## 2024-03-31 RX ADMIN — CLOPIDOGREL BISULFATE 75 MILLIGRAM(S): 75 TABLET, FILM COATED ORAL at 11:40

## 2024-03-31 RX ADMIN — CARVEDILOL PHOSPHATE 6.25 MILLIGRAM(S): 80 CAPSULE, EXTENDED RELEASE ORAL at 18:52

## 2024-03-31 RX ADMIN — GABAPENTIN 100 MILLIGRAM(S): 400 CAPSULE ORAL at 11:40

## 2024-03-31 RX ADMIN — PIPERACILLIN AND TAZOBACTAM 25 GRAM(S): 4; .5 INJECTION, POWDER, LYOPHILIZED, FOR SOLUTION INTRAVENOUS at 03:51

## 2024-03-31 RX ADMIN — Medication 650 MILLIGRAM(S): at 18:52

## 2024-03-31 RX ADMIN — Medication 975 MILLIGRAM(S): at 15:58

## 2024-03-31 RX ADMIN — CARVEDILOL PHOSPHATE 6.25 MILLIGRAM(S): 80 CAPSULE, EXTENDED RELEASE ORAL at 07:04

## 2024-03-31 RX ADMIN — Medication 975 MILLIGRAM(S): at 07:04

## 2024-03-31 NOTE — PROGRESS NOTE ADULT - SUBJECTIVE AND OBJECTIVE BOX
Patient is a 68y old  Male who presents with a chief complaint of osteomyelitis of foot (31 Mar 2024 08:50)      INTERVAL HPI/ OVERNIGHT EVENTS  Dressing c/d/i        LABS                        7.2    10.42 )-----------( 225      ( 30 Mar 2024 05:30 )             23.1     03-30    136  |  108  |  68<H>  ----------------------------<  142<H>  3.9   |  17<L>  |  4.68<H>    Ca    8.8      30 Mar 2024 05:30  Phos  4.5     03-30  Mg     1.9     03-30    TPro  5.8<L>  /  Alb  3.1<L>  /  TBili  0.2  /  DBili  x   /  AST  37  /  ALT  16  /  AlkPhos  61  03-30    PT/INR - ( 29 Mar 2024 11:48 )   PT: 13.2 sec;   INR: 1.16          PTT - ( 29 Mar 2024 11:48 )  PTT:43.3 sec    ICU Vital Signs Last 24 Hrs  T(C): 36.6 (31 Mar 2024 05:30), Max: 36.9 (30 Mar 2024 23:04)  T(F): 97.8 (31 Mar 2024 05:30), Max: 98.4 (30 Mar 2024 23:04)  HR: 71 (31 Mar 2024 06:58) (68 - 81)  BP: 111/65 (31 Mar 2024 06:58) (106/56 - 145/70)  BP(mean): --  ABP: --  ABP(mean): --  RR: 17 (31 Mar 2024 05:30) (17 - 20)  SpO2: 100% (31 Mar 2024 05:30) (95% - 100%)    O2 Parameters below as of 30 Mar 2024 23:57  Patient On (Oxygen Delivery Method): HOME CPAP        RADIOLOGY    MICROBIOLOGY    PHYSICAL EXAM  Vasc: PT/DP non-palpable b/l -bipasic waveforms heard at b/l DP and monophasic waveforms heard at b/l PT; Erythema around right lateral foot wound extending proximally to met base and dorsally to 5th metatarsal  Derm: Surgical site sutures intact with no obvious cardinal signs of active infection or drainage  Neuro: protective sensation in tact  MSK: s/p 2-5 ray amputations on Left, s/p partial 5th ray amputation Right

## 2024-03-31 NOTE — PROGRESS NOTE ADULT - SUBJECTIVE AND OBJECTIVE BOX
Patient is a 68y Male seen and evaluated at bedside.       Meds:    acetaminophen     Tablet .. 975 every 8 hours  apixaban 5 every 12 hours  atorvastatin 80 at bedtime  carvedilol 6.25 every 12 hours  clopidogrel Tablet 75 daily  dextrose 5%. 1000 <Continuous>  dextrose 5%. 1000 <Continuous>  dextrose 50% Injectable 25 once  dextrose 50% Injectable 12.5 once  dextrose 50% Injectable 25 once  dextrose Oral Gel 15 once PRN  gabapentin 100 daily  glucagon  Injectable 1 once  influenza  Vaccine (HIGH DOSE) 0.7 once  insulin lispro (ADMELOG) corrective regimen sliding scale  three times a day before meals  insulin lispro (ADMELOG) corrective regimen sliding scale  at bedtime  linezolid    Tablet 600 every 12 hours  oxyCODONE    IR 5 every 6 hours PRN  oxyCODONE    IR 7.5 every 6 hours PRN  piperacillin/tazobactam IVPB.. 4.5 every 12 hours  sertraline 50 every 24 hours  sodium bicarbonate 650 every 12 hours      T(C): , Max: 36.9 (03-30-24 @ 23:04)  T(F): , Max: 98.4 (03-30-24 @ 23:04)  HR: 71 (03-31-24 @ 06:58)  BP: 111/65 (03-31-24 @ 06:58)  BP(mean): --  RR: 17 (03-31-24 @ 05:30)  SpO2: 100% (03-31-24 @ 05:30)  Wt(kg): --        Review of Systems:  ROS negative except as per HPI      PHYSICAL EXAM:  General: NAD  Respiratory: CTA B/L; on RA  Cardiac: RRR; no M/R/G  Gastrointestinal: abdomen soft, NT/ND; no rebound or guarding  Extremities: no peripheral edema, R foot post-op dressing  Neurologic: AAOx3; no focal deficits      LABS:                        7.2    10.42 )-----------( 225      ( 30 Mar 2024 05:30 )             23.1     03-30    136  |  108  |  68<H>  ----------------------------<  142<H>  3.9   |  17<L>  |  4.68<H>    Ca    8.8      30 Mar 2024 05:30  Phos  4.5     03-30  Mg     1.9     03-30    TPro  5.8<L>  /  Alb  3.1<L>  /  TBili  0.2  /  DBili  x   /  AST  37  /  ALT  16  /  AlkPhos  61  03-30      PT/INR - ( 29 Mar 2024 11:48 )   PT: 13.2 sec;   INR: 1.16          PTT - ( 29 Mar 2024 11:48 )  PTT:43.3 sec  Urinalysis Basic - ( 30 Mar 2024 05:30 )    Color: x / Appearance: x / SG: x / pH: x  Gluc: 142 mg/dL / Ketone: x  / Bili: x / Urobili: x   Blood: x / Protein: x / Nitrite: x   Leuk Esterase: x / RBC: x / WBC x   Sq Epi: x / Non Sq Epi: x / Bacteria: x            RADIOLOGY & ADDITIONAL STUDIES:           Patient is a 68y Male seen and evaluated at bedside.   no complaints-- changing ostomy bag       Meds:    acetaminophen     Tablet .. 975 every 8 hours  apixaban 5 every 12 hours  atorvastatin 80 at bedtime  carvedilol 6.25 every 12 hours  clopidogrel Tablet 75 daily  dextrose 5%. 1000 <Continuous>  dextrose 5%. 1000 <Continuous>  dextrose 50% Injectable 25 once  dextrose 50% Injectable 12.5 once  dextrose 50% Injectable 25 once  dextrose Oral Gel 15 once PRN  gabapentin 100 daily  glucagon  Injectable 1 once  influenza  Vaccine (HIGH DOSE) 0.7 once  insulin lispro (ADMELOG) corrective regimen sliding scale  three times a day before meals  insulin lispro (ADMELOG) corrective regimen sliding scale  at bedtime  linezolid    Tablet 600 every 12 hours  oxyCODONE    IR 5 every 6 hours PRN  oxyCODONE    IR 7.5 every 6 hours PRN  piperacillin/tazobactam IVPB.. 4.5 every 12 hours  sertraline 50 every 24 hours  sodium bicarbonate 650 every 12 hours      T(C): , Max: 36.9 (03-30-24 @ 23:04)  T(F): , Max: 98.4 (03-30-24 @ 23:04)  HR: 71 (03-31-24 @ 06:58)  BP: 111/65 (03-31-24 @ 06:58)  BP(mean): --  RR: 17 (03-31-24 @ 05:30)  SpO2: 100% (03-31-24 @ 05:30)  Wt(kg): --        Review of Systems:  ROS negative except as per HPI      PHYSICAL EXAM:  General: NAD  Respiratory: CTA B/L; on RA  Cardiac: RRR; no M/R/G  Gastrointestinal: abdomen soft, NT/ND; no rebound or guarding  Extremities: no peripheral edema, R foot post-op dressing  Neurologic: AAOx3; no focal deficits      LABS:                        7.2    10.42 )-----------( 225      ( 30 Mar 2024 05:30 )             23.1     03-30    136  |  108  |  68<H>  ----------------------------<  142<H>  3.9   |  17<L>  |  4.68<H>    Ca    8.8      30 Mar 2024 05:30  Phos  4.5     03-30  Mg     1.9     03-30    TPro  5.8<L>  /  Alb  3.1<L>  /  TBili  0.2  /  DBili  x   /  AST  37  /  ALT  16  /  AlkPhos  61  03-30      PT/INR - ( 29 Mar 2024 11:48 )   PT: 13.2 sec;   INR: 1.16          PTT - ( 29 Mar 2024 11:48 )  PTT:43.3 sec  Urinalysis Basic - ( 30 Mar 2024 05:30 )    Color: x / Appearance: x / SG: x / pH: x  Gluc: 142 mg/dL / Ketone: x  / Bili: x / Urobili: x   Blood: x / Protein: x / Nitrite: x   Leuk Esterase: x / RBC: x / WBC x   Sq Epi: x / Non Sq Epi: x / Bacteria: x            RADIOLOGY & ADDITIONAL STUDIES:

## 2024-03-31 NOTE — PROGRESS NOTE ADULT - PROBLEM SELECTOR PLAN 1
Patient c/o right foot pain x 6-7 weeks.  Hx of diabetic foot infection with prior left toe amputation. On doxycycline outpatient x 2 weeks without improvement. Follows with vascular at [facility] for PAD.  On podiatry exam, erythema around R lateral foot wound extending proximally to met base and dorsally to 5th metatarsal; Right lateral 5th metatarsal head with 3 x 2 cm wound with overlying eschar, neg PTB or fluctuance  Labs significant for elevated ESR/CRP, mild leukocytosis.  Podiatry consulted. Given zosyn 3.375g x 1 in the ED. Concern for prior allergy to vancomycin vs Daptomycin?   - XR R foot: No convincing plain radiographic evidence for acute osteomyelitis.  - XR L foot : No tracking gas collections or gross radiographic evidence for osteomyelitis however if concern persists MRI suggested to further assess.  - CT R foot: No CT evidence of osteomyelitis. However, MRI would be a more sensitive test to evaluate for osteomyelitis.  - MRI of R foot (3/26): Osteomyelitis of the fifth metatarsal head and fifth digit proximal phalanx with adjacent soft tissue ulceration and phlegmon.  - hold off on vancomycin or dapto given unclear allergy history and patient stable   - BCx + gram + cocci, Staph Epi.   - Surveillance blood cultures- NTD  - ID consulted, f/u recs   - f/u podiatry recs;  - S/P right toe amputation 3/29. Surgical margins appear to be + for Pseudomonas  - c/w zosyn w/ pseudomonal dosing and Linezolid 600 mg q12 per ID

## 2024-03-31 NOTE — PROGRESS NOTE ADULT - PROBLEM SELECTOR PLAN 3
Bicarb 16 on admission-> 14. GAP 13 -> 16    - CTM  - Increased sodium bicarb to 2 tabs bid  - Renal consulted, f/u recs

## 2024-03-31 NOTE — PROGRESS NOTE ADULT - ASSESSMENT
69 y/o M pmh PVD, DM, COPD presents for right foot wound and cellulitis. Confirmed on MRI Osteomyelitis of Right 5th proximal phalanx and 5th metatarsal head now S/P right partial 5th ray amputation.     Plan:   - C/w IV abx  - F/U Deep dirty cx swabs x 2  - F/U Clean margin 5th metatarsal bone cx and pathology   - Pt should be Heel WBAT to RLE  - Rest of care per primary team    Plan d/w Attending. Podiatry following.

## 2024-03-31 NOTE — PROGRESS NOTE ADULT - ASSESSMENT
**********INCOMPLETE***********      69 yo M w/ CKD4 (BCr 3.8-4.2 since 6/2023) admitted for R foot OM, on zosyn/linezolid, now pending amputation. Kidney function close to baseline, slight variation in Cr may be hemodynamic due to relative changes in BP.  Pt also with hx of PAD, COPD on home O2, s/p colectomy with colostomy for colon ca      Imp: CKD at baseline.   Met acidosis due to above  Cr 4.68 today  Cystatin C 4.3, GFR by cystatin C 11 3/28  HCO3 17  CPK wnl      Plan:  Obtain UA, U. Na and UPCR.   Increase sodium bicarb to 2 tabs BID  Avoid hypotension, maintain SBP>120 for renal perfusion  Encourage PO intake. If inadequate, consider gentle IV hydration  Strict I&O  Renal diet  Cont. renally dosed medications  T. sat 19, holding IV Fe supplement iso active Osteomyelitis. Start Epo 10K units weekly or 20K units every 2 weeks   67 yo M w/ CKD4 (BCr 3.8-4.2 since 6/2023) admitted for R foot OM, on zosyn/linezolid, now pending amputation. Kidney function close to baseline, slight variation in Cr may be hemodynamic due to relative changes in BP.  Pt also with hx of PAD, COPD on home O2, s/p colectomy with colostomy for colon ca      Imp: CKD at baseline.   Met acidosis due to above  Cr 4.68 3/30. Pending today's labs  Cystatin C 4.3, GFR by cystatin C 11 3/28  HCO3 17  CPK wnl      Plan:  Obtain UA, U. Na and UPCR if Cr continues to rise  Increase sodium bicarb to 2 tabs BID  Avoid hypotension, maintain SBP>120 for renal perfusion  Encourage PO intake. If inadequate, consider gentle IV hydration. Also if Cr continues to rise, can given gentle IV hydration  Strict I&O  Renal diet  Cont. renally dosed medications  T. sat 19, holding IV Fe supplement iso active Osteomyelitis. Start Epo 10K units weekly or 20K units every 2 weeks

## 2024-03-31 NOTE — PROGRESS NOTE ADULT - SUBJECTIVE AND OBJECTIVE BOX
OVERNIGHT EVENTS: MARIPOSA    SUBJECTIVE / INTERVAL HPI: Patient seen and examined at bedside. Pt reports he has intermittent in his feet but controlled with medication. Denies fever, chills, abd pain, n/v/d.     VITAL SIGNS:  Vital Signs Last 24 Hrs  T(C): 36.6 (31 Mar 2024 11:21), Max: 36.9 (30 Mar 2024 23:04)  T(F): 97.9 (31 Mar 2024 11:21), Max: 98.4 (30 Mar 2024 23:04)  HR: 77 (31 Mar 2024 11:21) (71 - 81)  BP: 115/63 (31 Mar 2024 11:21) (106/56 - 115/63)  BP(mean): --  RR: 16 (31 Mar 2024 11:21) (16 - 20)  SpO2: 97% (31 Mar 2024 11:21) (97% - 100%)    Parameters below as of 31 Mar 2024 11:21  Patient On (Oxygen Delivery Method): room air        PHYSICAL EXAM:    Constitutional: NAD  Head: NC/AT  Eyes: PERRL, EOMI, anicteric sclera  ENT: no nasal discharge; MMM  Neck: supple; no JVD or thyromegaly  Respiratory: CTA B/L; no W/R/R, no retractions; on 2L NC  Cardiac: +S1/S2; RRR; no M/R/G  Gastrointestinal: abdomen soft, +ostomy bag draining billious stool    Extremities: WWP, no clubbing or cyanosis; no peripheral edema.   -per podiatry exam: Erythema around right lateral foot wound extending proximally to met base and dorsally to 5th metatarsal. S/P right toe amputation on 3/29. S/p 2-5 ray amputations on Left  Derm: Surgical site sutures intact with no obvious cardinal signs of active infection or drainage  Vascular: 2+ radial, non-palpable DP/PT pulses B/L (pulses dopplerable)  Neurologic: AAOx3;    MEDICATIONS:  MEDICATIONS  (STANDING):  acetaminophen     Tablet .. 975 milliGRAM(s) Oral every 8 hours  apixaban 5 milliGRAM(s) Oral every 12 hours  atorvastatin 80 milliGRAM(s) Oral at bedtime  carvedilol 6.25 milliGRAM(s) Oral every 12 hours  clopidogrel Tablet 75 milliGRAM(s) Oral daily  dextrose 5%. 1000 milliLiter(s) (100 mL/Hr) IV Continuous <Continuous>  dextrose 5%. 1000 milliLiter(s) (50 mL/Hr) IV Continuous <Continuous>  dextrose 50% Injectable 25 Gram(s) IV Push once  dextrose 50% Injectable 25 Gram(s) IV Push once  dextrose 50% Injectable 12.5 Gram(s) IV Push once  gabapentin 100 milliGRAM(s) Oral daily  glucagon  Injectable 1 milliGRAM(s) IntraMuscular once  influenza  Vaccine (HIGH DOSE) 0.7 milliLiter(s) IntraMuscular once  insulin lispro (ADMELOG) corrective regimen sliding scale   SubCutaneous at bedtime  insulin lispro (ADMELOG) corrective regimen sliding scale   SubCutaneous three times a day before meals  linezolid    Tablet 600 milliGRAM(s) Oral every 12 hours  piperacillin/tazobactam IVPB.. 4.5 Gram(s) IV Intermittent every 12 hours  sertraline 50 milliGRAM(s) Oral every 24 hours  sodium bicarbonate 650 milliGRAM(s) Oral every 12 hours    MEDICATIONS  (PRN):  dextrose Oral Gel 15 Gram(s) Oral once PRN Blood Glucose LESS THAN 70 milliGRAM(s)/deciliter  oxyCODONE    IR 5 milliGRAM(s) Oral every 6 hours PRN Moderate Pain (4 - 6)  oxyCODONE    IR 7.5 milliGRAM(s) Oral every 6 hours PRN Severe Pain (7 - 10)      ALLERGIES:  Allergies    vancomycin (Unknown)    Intolerances        LABS:                        7.2    10.42 )-----------( 225      ( 30 Mar 2024 05:30 )             23.1     03-30    136  |  108  |  68<H>  ----------------------------<  142<H>  3.9   |  17<L>  |  4.68<H>    Ca    8.8      30 Mar 2024 05:30  Phos  4.5     03-30  Mg     1.9     03-30    TPro  5.8<L>  /  Alb  3.1<L>  /  TBili  0.2  /  DBili  x   /  AST  37  /  ALT  16  /  AlkPhos  61  03-30      Urinalysis Basic - ( 30 Mar 2024 05:30 )    Color: x / Appearance: x / SG: x / pH: x  Gluc: 142 mg/dL / Ketone: x  / Bili: x / Urobili: x   Blood: x / Protein: x / Nitrite: x   Leuk Esterase: x / RBC: x / WBC x   Sq Epi: x / Non Sq Epi: x / Bacteria: x      CAPILLARY BLOOD GLUCOSE      POCT Blood Glucose.: 149 mg/dL (31 Mar 2024 12:32)      RADIOLOGY & ADDITIONAL TESTS: Reviewed.

## 2024-03-31 NOTE — PROGRESS NOTE ADULT - PROBLEM SELECTOR PLAN 4
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 No s/s of active bleeding. Per old lab review, Hgb was 9 in 1/2024.   Hgb currently stable.   - T iron 27,     -Holding IV Fe supplement iso active Osteomyelitis. Start Epo 10K units weekly or 20K units every 2 weeks  - maintain active T&S  - transfuse for hgb < 7

## 2024-04-01 LAB
-  AMPICILLIN/SULBACTAM: SIGNIFICANT CHANGE UP
-  AMPICILLIN: SIGNIFICANT CHANGE UP
-  CEFAZOLIN: SIGNIFICANT CHANGE UP
-  CEFEPIME: SIGNIFICANT CHANGE UP
-  CEFOXITIN: SIGNIFICANT CHANGE UP
-  CEFTRIAXONE: SIGNIFICANT CHANGE UP
-  CIPROFLOXACIN: SIGNIFICANT CHANGE UP
-  ERTAPENEM: SIGNIFICANT CHANGE UP
-  GENTAMICIN: SIGNIFICANT CHANGE UP
-  PIPERACILLIN/TAZOBACTAM: SIGNIFICANT CHANGE UP
-  TOBRAMYCIN: SIGNIFICANT CHANGE UP
-  TRIMETHOPRIM/SULFAMETHOXAZOLE: SIGNIFICANT CHANGE UP
ALBUMIN SERPL ELPH-MCNC: 3 G/DL — LOW (ref 3.3–5)
ALP SERPL-CCNC: 60 U/L — SIGNIFICANT CHANGE UP (ref 40–120)
ALT FLD-CCNC: 15 U/L — SIGNIFICANT CHANGE UP (ref 10–45)
ANION GAP SERPL CALC-SCNC: 12 MMOL/L — SIGNIFICANT CHANGE UP (ref 5–17)
AST SERPL-CCNC: 23 U/L — SIGNIFICANT CHANGE UP (ref 10–40)
BASOPHILS # BLD AUTO: 0.03 K/UL — SIGNIFICANT CHANGE UP (ref 0–0.2)
BASOPHILS NFR BLD AUTO: 0.3 % — SIGNIFICANT CHANGE UP (ref 0–2)
BILIRUB SERPL-MCNC: 0.2 MG/DL — SIGNIFICANT CHANGE UP (ref 0.2–1.2)
BLD GP AB SCN SERPL QL: NEGATIVE — SIGNIFICANT CHANGE UP
BUN SERPL-MCNC: 58 MG/DL — HIGH (ref 7–23)
CALCIUM SERPL-MCNC: 8.8 MG/DL — SIGNIFICANT CHANGE UP (ref 8.4–10.5)
CHLORIDE SERPL-SCNC: 106 MMOL/L — SIGNIFICANT CHANGE UP (ref 96–108)
CO2 SERPL-SCNC: 15 MMOL/L — LOW (ref 22–31)
CREAT SERPL-MCNC: 4.34 MG/DL — HIGH (ref 0.5–1.3)
CULTURE RESULTS: ABNORMAL
CULTURE RESULTS: SIGNIFICANT CHANGE UP
EGFR: 14 ML/MIN/1.73M2 — LOW
EOSINOPHIL # BLD AUTO: 0.19 K/UL — SIGNIFICANT CHANGE UP (ref 0–0.5)
EOSINOPHIL NFR BLD AUTO: 1.7 % — SIGNIFICANT CHANGE UP (ref 0–6)
GLUCOSE BLDC GLUCOMTR-MCNC: 138 MG/DL — HIGH (ref 70–99)
GLUCOSE BLDC GLUCOMTR-MCNC: 149 MG/DL — HIGH (ref 70–99)
GLUCOSE BLDC GLUCOMTR-MCNC: 153 MG/DL — HIGH (ref 70–99)
GLUCOSE BLDC GLUCOMTR-MCNC: 172 MG/DL — HIGH (ref 70–99)
GLUCOSE BLDC GLUCOMTR-MCNC: 98 MG/DL — SIGNIFICANT CHANGE UP (ref 70–99)
GLUCOSE SERPL-MCNC: 170 MG/DL — HIGH (ref 70–99)
HCT VFR BLD CALC: 22.4 % — LOW (ref 39–50)
HCT VFR BLD CALC: 25.8 % — LOW (ref 39–50)
HGB BLD-MCNC: 6.8 G/DL — CRITICAL LOW (ref 13–17)
HGB BLD-MCNC: 8.1 G/DL — LOW (ref 13–17)
IMM GRANULOCYTES NFR BLD AUTO: 0.4 % — SIGNIFICANT CHANGE UP (ref 0–0.9)
LYMPHOCYTES # BLD AUTO: 27.4 % — SIGNIFICANT CHANGE UP (ref 13–44)
LYMPHOCYTES # BLD AUTO: 3.15 K/UL — SIGNIFICANT CHANGE UP (ref 1–3.3)
MAGNESIUM SERPL-MCNC: 1.8 MG/DL — SIGNIFICANT CHANGE UP (ref 1.6–2.6)
MCHC RBC-ENTMCNC: 27.6 PG — SIGNIFICANT CHANGE UP (ref 27–34)
MCHC RBC-ENTMCNC: 28.4 PG — SIGNIFICANT CHANGE UP (ref 27–34)
MCHC RBC-ENTMCNC: 30.4 GM/DL — LOW (ref 32–36)
MCHC RBC-ENTMCNC: 31.4 GM/DL — LOW (ref 32–36)
MCV RBC AUTO: 90.5 FL — SIGNIFICANT CHANGE UP (ref 80–100)
MCV RBC AUTO: 91.1 FL — SIGNIFICANT CHANGE UP (ref 80–100)
METHOD TYPE: SIGNIFICANT CHANGE UP
MONOCYTES # BLD AUTO: 0.8 K/UL — SIGNIFICANT CHANGE UP (ref 0–0.9)
MONOCYTES NFR BLD AUTO: 7 % — SIGNIFICANT CHANGE UP (ref 2–14)
NEUTROPHILS # BLD AUTO: 7.28 K/UL — SIGNIFICANT CHANGE UP (ref 1.8–7.4)
NEUTROPHILS NFR BLD AUTO: 63.2 % — SIGNIFICANT CHANGE UP (ref 43–77)
NRBC # BLD: 0 /100 WBCS — SIGNIFICANT CHANGE UP (ref 0–0)
NRBC # BLD: 0 /100 WBCS — SIGNIFICANT CHANGE UP (ref 0–0)
ORGANISM # SPEC MICROSCOPIC CNT: ABNORMAL
ORGANISM # SPEC MICROSCOPIC CNT: SIGNIFICANT CHANGE UP
PHOSPHATE SERPL-MCNC: 4.2 MG/DL — SIGNIFICANT CHANGE UP (ref 2.5–4.5)
PLATELET # BLD AUTO: 199 K/UL — SIGNIFICANT CHANGE UP (ref 150–400)
PLATELET # BLD AUTO: 208 K/UL — SIGNIFICANT CHANGE UP (ref 150–400)
POTASSIUM SERPL-MCNC: 4 MMOL/L — SIGNIFICANT CHANGE UP (ref 3.5–5.3)
POTASSIUM SERPL-SCNC: 4 MMOL/L — SIGNIFICANT CHANGE UP (ref 3.5–5.3)
PROT SERPL-MCNC: 5.9 G/DL — LOW (ref 6–8.3)
RBC # BLD: 2.46 M/UL — LOW (ref 4.2–5.8)
RBC # BLD: 2.85 M/UL — LOW (ref 4.2–5.8)
RBC # FLD: 14.5 % — SIGNIFICANT CHANGE UP (ref 10.3–14.5)
RBC # FLD: 14.8 % — HIGH (ref 10.3–14.5)
RH IG SCN BLD-IMP: POSITIVE — SIGNIFICANT CHANGE UP
SODIUM SERPL-SCNC: 133 MMOL/L — LOW (ref 135–145)
SPECIMEN SOURCE: SIGNIFICANT CHANGE UP
WBC # BLD: 10.59 K/UL — HIGH (ref 3.8–10.5)
WBC # BLD: 11.5 K/UL — HIGH (ref 3.8–10.5)
WBC # FLD AUTO: 10.59 K/UL — HIGH (ref 3.8–10.5)
WBC # FLD AUTO: 11.5 K/UL — HIGH (ref 3.8–10.5)

## 2024-04-01 PROCEDURE — 99233 SBSQ HOSP IP/OBS HIGH 50: CPT | Mod: GC

## 2024-04-01 PROCEDURE — 99232 SBSQ HOSP IP/OBS MODERATE 35: CPT

## 2024-04-01 RX ORDER — CLOPIDOGREL BISULFATE 75 MG/1
75 TABLET, FILM COATED ORAL DAILY
Refills: 0 | Status: DISCONTINUED | OUTPATIENT
Start: 2024-04-01 | End: 2024-04-03

## 2024-04-01 RX ORDER — GABAPENTIN 400 MG/1
100 CAPSULE ORAL THREE TIMES A DAY
Refills: 0 | Status: DISCONTINUED | OUTPATIENT
Start: 2024-04-01 | End: 2024-04-03

## 2024-04-01 RX ORDER — APIXABAN 2.5 MG/1
5 TABLET, FILM COATED ORAL
Refills: 0 | Status: DISCONTINUED | OUTPATIENT
Start: 2024-04-01 | End: 2024-04-03

## 2024-04-01 RX ADMIN — ATORVASTATIN CALCIUM 80 MILLIGRAM(S): 80 TABLET, FILM COATED ORAL at 21:18

## 2024-04-01 RX ADMIN — Medication 2: at 14:04

## 2024-04-01 RX ADMIN — GABAPENTIN 100 MILLIGRAM(S): 400 CAPSULE ORAL at 09:50

## 2024-04-01 RX ADMIN — OXYCODONE HYDROCHLORIDE 7.5 MILLIGRAM(S): 5 TABLET ORAL at 06:36

## 2024-04-01 RX ADMIN — CLOPIDOGREL BISULFATE 75 MILLIGRAM(S): 75 TABLET, FILM COATED ORAL at 12:50

## 2024-04-01 RX ADMIN — Medication 1300 MILLIGRAM(S): at 18:23

## 2024-04-01 RX ADMIN — GABAPENTIN 100 MILLIGRAM(S): 400 CAPSULE ORAL at 12:50

## 2024-04-01 RX ADMIN — APIXABAN 5 MILLIGRAM(S): 2.5 TABLET, FILM COATED ORAL at 18:23

## 2024-04-01 RX ADMIN — PIPERACILLIN AND TAZOBACTAM 25 GRAM(S): 4; .5 INJECTION, POWDER, LYOPHILIZED, FOR SOLUTION INTRAVENOUS at 11:57

## 2024-04-01 RX ADMIN — Medication 1300 MILLIGRAM(S): at 06:07

## 2024-04-01 RX ADMIN — OXYCODONE HYDROCHLORIDE 7.5 MILLIGRAM(S): 5 TABLET ORAL at 05:36

## 2024-04-01 RX ADMIN — Medication 975 MILLIGRAM(S): at 21:18

## 2024-04-01 RX ADMIN — CARVEDILOL PHOSPHATE 6.25 MILLIGRAM(S): 80 CAPSULE, EXTENDED RELEASE ORAL at 18:23

## 2024-04-01 RX ADMIN — APIXABAN 5 MILLIGRAM(S): 2.5 TABLET, FILM COATED ORAL at 06:07

## 2024-04-01 RX ADMIN — OXYCODONE HYDROCHLORIDE 7.5 MILLIGRAM(S): 5 TABLET ORAL at 21:17

## 2024-04-01 RX ADMIN — ERYTHROPOIETIN 10000 UNIT(S): 10000 INJECTION, SOLUTION INTRAVENOUS; SUBCUTANEOUS at 10:25

## 2024-04-01 RX ADMIN — LINEZOLID 600 MILLIGRAM(S): 600 INJECTION, SOLUTION INTRAVENOUS at 06:06

## 2024-04-01 NOTE — DIETITIAN INITIAL EVALUATION ADULT - PROBLEM SELECTOR PLAN 9
Hx of COPD, on 2L O2 at home.   No s/s of exacerbation on this admission, satting 100% on home O2. Uses CPAP at nighttime.   - c/w home O2  - c/w CPAP at nighttime

## 2024-04-01 NOTE — PROGRESS NOTE ADULT - SUBJECTIVE AND OBJECTIVE BOX
Patient is a 68y old  Male who presents with a chief complaint of osteomyelitis of foot (31 Mar 2024 08:52)      INTERVAL HPI/ OVERNIGHT EVENTS. Hgb downtrended to 6.8 this morning. Planned for transfusion. Pt seen and examined bedside. Pt is POD3 from s/p R partial 5th ray amp. Dressing saturated with sanguinous drainage.       LABS                        6.8    10.59 )-----------( 199      ( 01 Apr 2024 07:14 )             22.4     04-01    133<L>  |  106  |  58<H>  ----------------------------<  170<H>  4.0   |  15<L>  |  4.34<H>    Ca    8.8      01 Apr 2024 07:14  Phos  4.2     04-01  Mg     1.8     04-01    TPro  5.9<L>  /  Alb  3.0<L>  /  TBili  0.2  /  DBili  x   /  AST  23  /  ALT  15  /  AlkPhos  60  04-01        ICU Vital Signs Last 24 Hrs  T(C): 36.4 (01 Apr 2024 05:48), Max: 37 (31 Mar 2024 21:36)  T(F): 97.5 (01 Apr 2024 05:48), Max: 98.6 (31 Mar 2024 21:36)  HR: 88 (01 Apr 2024 05:48) (75 - 92)  BP: 130/65 (01 Apr 2024 05:48) (115/63 - 132/74)  BP(mean): --  ABP: --  ABP(mean): --  RR: 19 (01 Apr 2024 05:48) (16 - 19)  SpO2: 100% (01 Apr 2024 05:48) (97% - 100%)    O2 Parameters below as of 01 Apr 2024 05:48  Patient On (Oxygen Delivery Method): nasal cannula      RADIOLOGY  < from: Xray Foot AP + Lateral + Oblique, Right (03.29.24 @ 17:34) >  Currently status post partial 5th ray resection through mid metatarsal   shaft with sharp smooth osseous stump margin and with postsurgical   changes in the overlying soft tissues.    No proximally tracking gas collections beyond the amputation margins and   no focal areas of osteomyelitis.    Remainder of foot unchanged. Also correlate with intraoperative findings.  < end of copied text >    MICROBIOLOGY    Culture - Tissue with Gram Stain (collected 29 Mar 2024 16:23)  Source: .Tissue Right foot proximal margin fifth metatarsal  Gram Stain (30 Mar 2024 12:13):    No organisms seen    Rare WBC's  Preliminary Report (31 Mar 2024 18:09):    Rare Morganella morganii    Moderate Pseudomonas aeruginosa    Result called to and read back by_ Ms. KADIHazel Low RN  03/31/2024 12:33:28    Culture in progress  Organism: Pseudomonas aeruginosa  Pseudomonas aeruginosa (31 Mar 2024 12:24)  Organism: Pseudomonas aeruginosa (31 Mar 2024 12:24)  Organism: Pseudomonas aeruginosa (31 Mar 2024 12:23)    Culture - Surgical Swab (collected 29 Mar 2024 16:23)  Source: .Surgical Swab Right foot deep wound #2  Gram Stain (30 Mar 2024 12:16):    No organisms seen    Rare WBC's  Preliminary Report (31 Mar 2024 18:21):    Rare Morganella morganii    Moderate Pseudomonas aeruginosa    Culture in progress  Organism: Pseudomonas aeruginosa  Pseudomonas aeruginosa (31 Mar 2024 12:19)  Organism: Pseudomonas aeruginosa (31 Mar 2024 12:19)  Organism: Pseudomonas aeruginosa (31 Mar 2024 12:18)    Culture - Surgical Swab (collected 29 Mar 2024 16:21)  Source: .Surgical Swab Right foot deep wound #1  Gram Stain (30 Mar 2024 12:19):    No organisms seen    Rare WBC's  Preliminary Report (31 Mar 2024 18:11):    Rare Morganella morganii    Moderate Pseudomonas aeruginosa    Culture in progress  Organism: Pseudomonas aeruginosa  Pseudomonas aeruginosa (31 Mar 2024 12:15)  Organism: Pseudomonas aeruginosa (31 Mar 2024 12:15)  Organism: Pseudomonas aeruginosa (31 Mar 2024 12:14)      PHYSICAL EXAM  Lower Extremity Focused  Vasc: PT/DP non-palpable b/l -bipasic waveforms heard at b/l DP and monophasic waveforms heard at b/l PT; no erythema or edema   Derm: Surgical site sutures intact with macerated edges - no active drainage, no malodor with no   Neuro: protective sensation in tact  MSK: s/p 2-5 ray amputations on Left, s/p partial 5th ray amputation Right

## 2024-04-01 NOTE — PROGRESS NOTE ADULT - SUBJECTIVE AND OBJECTIVE BOX
INTERVAL HPI/OVERNIGHT EVENTS:    Patient was seen and examined at bedside.  Reports the pain in his right foot is uncontrolled    CONSTITUTIONAL:  Negative fever or chills, feels well, good appetite  EYES:  Negative  blurry vision or double vision  CARDIOVASCULAR:  Negative for chest pain or palpitations  RESPIRATORY:  Negative for cough, wheezing, or SOB   GASTROINTESTINAL:  Negative for nausea, vomiting, diarrhea, constipation, or abdominal pain  GENITOURINARY:  Negative frequency, urgency or dysuria  NEUROLOGIC:  No headache, confusion, dizziness, lightheadedness      ANTIBIOTICS/RELEVANT:    MEDICATIONS  (STANDING):  acetaminophen     Tablet .. 975 milliGRAM(s) Oral every 8 hours  atorvastatin 80 milliGRAM(s) Oral at bedtime  carvedilol 6.25 milliGRAM(s) Oral every 12 hours  dextrose 5%. 1000 milliLiter(s) (100 mL/Hr) IV Continuous <Continuous>  dextrose 5%. 1000 milliLiter(s) (50 mL/Hr) IV Continuous <Continuous>  dextrose 50% Injectable 25 Gram(s) IV Push once  dextrose 50% Injectable 25 Gram(s) IV Push once  dextrose 50% Injectable 12.5 Gram(s) IV Push once  epoetin ori-epbx (RETACRIT) Injectable 16011 Unit(s) IV Push <User Schedule>  gabapentin 100 milliGRAM(s) Oral daily  glucagon  Injectable 1 milliGRAM(s) IntraMuscular once  influenza  Vaccine (HIGH DOSE) 0.7 milliLiter(s) IntraMuscular once  insulin lispro (ADMELOG) corrective regimen sliding scale   SubCutaneous three times a day before meals  insulin lispro (ADMELOG) corrective regimen sliding scale   SubCutaneous at bedtime  linezolid    Tablet 600 milliGRAM(s) Oral every 12 hours  piperacillin/tazobactam IVPB.. 4.5 Gram(s) IV Intermittent every 12 hours  sertraline 50 milliGRAM(s) Oral every 24 hours  sodium bicarbonate 1300 milliGRAM(s) Oral every 12 hours    MEDICATIONS  (PRN):  dextrose Oral Gel 15 Gram(s) Oral once PRN Blood Glucose LESS THAN 70 milliGRAM(s)/deciliter  oxyCODONE    IR 7.5 milliGRAM(s) Oral every 6 hours PRN Severe Pain (7 - 10)  oxyCODONE    IR 5 milliGRAM(s) Oral every 6 hours PRN Moderate Pain (4 - 6)        Vital Signs Last 24 Hrs  T(C): 36.4 (01 Apr 2024 05:48), Max: 37 (31 Mar 2024 21:36)  T(F): 97.5 (01 Apr 2024 05:48), Max: 98.6 (31 Mar 2024 21:36)  HR: 88 (01 Apr 2024 05:48) (75 - 92)  BP: 130/65 (01 Apr 2024 05:48) (115/63 - 132/74)  BP(mean): --  RR: 19 (01 Apr 2024 05:48) (16 - 19)  SpO2: 100% (01 Apr 2024 05:48) (97% - 100%)    Parameters below as of 01 Apr 2024 05:48  Patient On (Oxygen Delivery Method): nasal cannula        PHYSICAL EXAM:  Constitutional: non-toxic, no distress  Eyes:HEATHER, EOMI  Ear/Nose/Throat: no oral lesion, no sinus tenderness on percussion	  Neck:  supple  Respiratory: CTA som  Cardiovascular: S1S2 RRR, no murmurs  Gastrointestinal:soft, (+) BS, no HSM  Extremities:  right foot s/p partial amputation   Vascular: DP Pulse:	right normal; left normal      LABS:                        6.8    10.59 )-----------( 199      ( 01 Apr 2024 07:14 )             22.4     04-01    133<L>  |  106  |  58<H>  ----------------------------<  170<H>  4.0   |  15<L>  |  4.34<H>    Ca    8.8      01 Apr 2024 07:14  Phos  4.2     04-01  Mg     1.8     04-01    TPro  5.9<L>  /  Alb  3.0<L>  /  TBili  0.2  /  DBili  x   /  AST  23  /  ALT  15  /  AlkPhos  60  04-01      Urinalysis Basic - ( 01 Apr 2024 07:14 )    Color: x / Appearance: x / SG: x / pH: x  Gluc: 170 mg/dL / Ketone: x  / Bili: x / Urobili: x   Blood: x / Protein: x / Nitrite: x   Leuk Esterase: x / RBC: x / WBC x   Sq Epi: x / Non Sq Epi: x / Bacteria: x        MICROBIOLOGY:    Culture - Tissue with Gram Stain (03.29.24 @ 16:23)    -  Levofloxacin: S <=0.5   -  Piperacillin/Tazobactam: S <=8   Gram Stain:   No organisms seen  Rare WBC's   -  Aztreonam: S <=4   -  Cefepime: S <=2   -  Ciprofloxacin: S <=0.25   Specimen Source: .Tissue Right foot proximal margin fifth metatarsal   Culture Results:   Rare Morganella morganii  Moderate Pseudomonas aeruginosa  Result called to and read back byCarrie Low RN  03/31/2024 12:33:28  Culture in progress   Organism Identification: Pseudomonas aeruginosa  Pseudomonas aeruginosa   Organism: Pseudomonas aeruginosa   Organism: Pseudomonas aeruginosa   Method Type: KB   Method Type: SCOTT    Culture - Surgical Swab (03.29.24 @ 16:23)    -  Aztreonam: S <=4   -  Cefepime: S <=2   Gram Stain:   No organisms seen  Rare WBC's   -  Piperacillin/Tazobactam: S <=8   -  Ciprofloxacin: S <=0.25   -  Levofloxacin: S <=0.5   Specimen Source: .Surgical Swab Right foot deep wound #2   Culture Results:   Rare Morganella morganii  Moderate Pseudomonas aeruginosa  Culture in progress   Organism Identification: Pseudomonas aeruginosa  Pseudomonas aeruginosa   Organism: Pseudomonas aeruginosa   Organism: Pseudomonas aeruginosa   Method Type: SCOTT   Method Type: KB        RADIOLOGY & ADDITIONAL STUDIES:

## 2024-04-01 NOTE — DIETITIAN INITIAL EVALUATION ADULT - PERTINENT MEDS FT
MEDICATIONS  (STANDING):  acetaminophen     Tablet .. 975 milliGRAM(s) Oral every 8 hours  apixaban 5 milliGRAM(s) Oral two times a day  atorvastatin 80 milliGRAM(s) Oral at bedtime  carvedilol 6.25 milliGRAM(s) Oral every 12 hours  clopidogrel Tablet 75 milliGRAM(s) Oral daily  dextrose 5%. 1000 milliLiter(s) (100 mL/Hr) IV Continuous <Continuous>  dextrose 5%. 1000 milliLiter(s) (50 mL/Hr) IV Continuous <Continuous>  dextrose 50% Injectable 25 Gram(s) IV Push once  dextrose 50% Injectable 25 Gram(s) IV Push once  dextrose 50% Injectable 12.5 Gram(s) IV Push once  epoetin ori-epbx (RETACRIT) Injectable 48313 Unit(s) IV Push <User Schedule>  gabapentin 100 milliGRAM(s) Oral three times a day  glucagon  Injectable 1 milliGRAM(s) IntraMuscular once  influenza  Vaccine (HIGH DOSE) 0.7 milliLiter(s) IntraMuscular once  insulin lispro (ADMELOG) corrective regimen sliding scale   SubCutaneous three times a day before meals  insulin lispro (ADMELOG) corrective regimen sliding scale   SubCutaneous at bedtime  piperacillin/tazobactam IVPB.. 4.5 Gram(s) IV Intermittent every 12 hours  sertraline 50 milliGRAM(s) Oral every 24 hours  sodium bicarbonate 1300 milliGRAM(s) Oral every 12 hours    MEDICATIONS  (PRN):  dextrose Oral Gel 15 Gram(s) Oral once PRN Blood Glucose LESS THAN 70 milliGRAM(s)/deciliter  oxyCODONE    IR 7.5 milliGRAM(s) Oral every 6 hours PRN Severe Pain (7 - 10)  oxyCODONE    IR 5 milliGRAM(s) Oral every 6 hours PRN Moderate Pain (4 - 6)

## 2024-04-01 NOTE — DIETITIAN INITIAL EVALUATION ADULT - PROBLEM SELECTOR PLAN 6
Pt with h/o of paroxysmal AFib per Cardiologist. Currently rate controlled.   Home meds: Eliquis 5 bid, coreg 6.25 mg bid     - c/w eliquis  - hold coreg for now

## 2024-04-01 NOTE — PROGRESS NOTE ADULT - PROBLEM SELECTOR PLAN 3
Patient with hx of CKD, baseline Cr 3.9- 4 per son.   On presentation, BUN/Cr 81/4.20 with metabolic acidosis likely iso acute renal failure, HCO3 16.   Cystatin C 4.3   S/P 500 cc LR bolus. Cr. is uptrending.  cc  Home med: sodium bicarb 650mg bid.    - F/u Renal recs  - avoid nephrotoxic agents

## 2024-04-01 NOTE — DIETITIAN INITIAL EVALUATION ADULT - PROBLEM SELECTOR PLAN 8
History of right lower extremity angioplasty in 1/2024.   Per collateral  from Pt's Vascular Surgeon, Pt had a balloon Angioplasty in Proximal AT abd below the knee popliteal   as started on Plavix since with 1 conventional and 1 drug eluting stent.   Home meds: Plavix 75 mg qd    - c/w home meds

## 2024-04-01 NOTE — DIETITIAN INITIAL EVALUATION ADULT - EDUCATION DIETARY MODIFICATIONS
Educated on importance of adequate protein intake and blood glucose control for wound healing. Discussed dietary sources of protein and strategies to promote good nutrition status. Pt aware RD remains available for additional questions/concerns.

## 2024-04-01 NOTE — PROVIDER CONTACT NOTE (CRITICAL VALUE NOTIFICATION) - BACKGROUND
68 year old M with PMHx of chronic afib, aortic valve replacement, diabetic wounds, presents to the ED with right toe pain admitted for osteomyelitis

## 2024-04-01 NOTE — PROGRESS NOTE ADULT - ASSESSMENT
Patient is a 67yo M [facility] of DM, diabetic wounds s/p left toe amputations, COPD (on home 2L, on BIPAP overnight), CKD (unknown baseline Cr), and colon cancer (w/ colostomy bag) who presents with right toe pain/wound for the past 6-7 weeks, admitted for osteomyelitis of R foot. S/P right partial 5th ray amputation on 3/29/24.

## 2024-04-01 NOTE — DIETITIAN INITIAL EVALUATION ADULT - PROBLEM SELECTOR PLAN 4
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 No s/s of active bleeding. Per old lab review, Hgb was 9 in 1/2024.     - F/u iron studies  - maintain active T&S  - transfuse for hgb < 7

## 2024-04-01 NOTE — DIETITIAN INITIAL EVALUATION ADULT - PROBLEM SELECTOR PLAN 5
Patient with hx of diabetes, reportedly last A1c 5. Self-titrates home insulin doses based on blood sugar, but unable to name how much he takes on average.   - A1c 5 (3/26)    - monitor FSG  - moderate ISS

## 2024-04-01 NOTE — DIETITIAN INITIAL EVALUATION ADULT - OTHER INFO
67yo M with PMH of DM, diabetic wounds status post left toe amputation, COPD (on home 2L, on BIPAP overnight), CKD (unknown baseline Cr), and colon cancer (with colostomy bag) who presents with right toe pain/wound for the past 6-7 weeks, admitted for osteomyelitis of R foot.     Pt seen on 4UR for assessment. Labs and medication orders reviewed. Ordered for retracrit. Na 133 <L>, K/Mg/Phos WNL, BUN/Cr 58/4.34 <H>, POC blood glucose (3/31-4/1) 124-185, HgbA1c 5% (3/26). On Consistent Carbohydrate Renal Kosher diet. Pt reports good appetite and intake, notes pain intermittently making it difficult to eat - MD and RN aware per pt, pain regimen ordered. Pt endorses wt stability PTA, RD observed pt with no overt signs of wasting. Pt denies difficulty chewing/swallowing. Denies nausea/vomiting, endorses normal ostomy output. Confirms no known food allergies. Kosher diet restriction noted. No pressure injuries or edema documented, right foot osteomyelitis noted, Fausto score 21. See nutrition recommendations. RD to remain available.

## 2024-04-01 NOTE — DIETITIAN INITIAL EVALUATION ADULT - ADD RECOMMEND
1. Recommend Kosher Consistent Carbohydrate Low Sodium No Concentrated Phosphorus diet.  >>Recommend liberalize K as consistently WNL/low.   >>Encourage & monitor PO intake. [place] dietary preferences as able.   2. Monitor GI tolerance, weight trends, labs, & skin integrity.  3. Defer bowel and pain regimens to team.   4. RD to remain available for diet education/intervention prn.

## 2024-04-01 NOTE — DIETITIAN INITIAL EVALUATION ADULT - PERTINENT LABORATORY DATA
04-01    133<L>  |  106  |  58<H>  ----------------------------<  170<H>  4.0   |  15<L>  |  4.34<H>    Ca    8.8      01 Apr 2024 07:14  Phos  4.2     04-01  Mg     1.8     04-01    TPro  5.9<L>  /  Alb  3.0<L>  /  TBili  0.2  /  DBili  x   /  AST  23  /  ALT  15  /  AlkPhos  60  04-01  POCT Blood Glucose.: 172 mg/dL (04-01-24 @ 13:41)  A1C with Estimated Average Glucose Result: 5.0 % (03-26-24 @ 05:30)

## 2024-04-01 NOTE — DIETITIAN INITIAL EVALUATION ADULT - PROBLEM SELECTOR PLAN 2
Patient with hx of CKD, baseline Cr 3.9- 4 per son.   On presentation, BUN/Cr 81/4.20 with metabolic acidosis likely iso acute renal failure, HCO3 16.   S/P 500 cc LR bolus   Home med: sodium bicarb 650mg bid.    - Renal consulted  - f/u urine lytes  - avoid nephrotoxic agents  - monitor urine output  - continue sodium bicarb 650 bid

## 2024-04-01 NOTE — PROGRESS NOTE ADULT - SUBJECTIVE AND OBJECTIVE BOX
OVERNIGHT EVENTS: Pt's AM Hgb 6.8. 1 unit prbc ordered.     SUBJECTIVE / INTERVAL HPI: Patient seen and examined at bedside. Pt states he is feeling ok , still having some intermittent foot pain and occasional bleeding. Denies fever, chills, chest pain, sob, n/v.     VITAL SIGNS:  Vital Signs Last 24 Hrs  T(C): 36.3 (01 Apr 2024 14:26), Max: 37 (31 Mar 2024 21:36)  T(F): 97.4 (01 Apr 2024 14:26), Max: 98.6 (31 Mar 2024 21:36)  HR: 82 (01 Apr 2024 14:26) (75 - 92)  BP: 132/61 (01 Apr 2024 14:26) (115/71 - 136/66)  BP(mean): --  RR: 18 (01 Apr 2024 14:26) (17 - 19)  SpO2: 100% (01 Apr 2024 14:26) (100% - 100%)    Parameters below as of 01 Apr 2024 14:26  Patient On (Oxygen Delivery Method): nasal cannula  O2 Flow (L/min): 2      PHYSICAL EXAM:    Constitutional: NAD  Head: NC/AT  Eyes: PERRL, EOMI, anicteric sclera  ENT: no nasal discharge; MMM  Neck: supple; no JVD or thyromegaly  Respiratory: CTA B/L; no W/R/R, no retractions; on 2L NC  Cardiac: +S1/S2; RRR; no M/R/G  Gastrointestinal: abdomen soft, +ostomy bag draining billious stool    Extremities: WWP, no clubbing or cyanosis; no peripheral edema.   -per podiatry exam: Erythema around right lateral foot wound extending proximally to met base and dorsally to 5th metatarsal. S/P right toe amputation on 3/29. S/p 2-5 ray amputations on Left  Derm: Surgical site sutures intact with no obvious cardinal signs of active infection or drainage  Vascular: 2+ radial, non-palpable DP/PT pulses B/L (pulses dopplerable)  Neurologic: AAOx3;    MEDICATIONS:  MEDICATIONS  (STANDING):  acetaminophen     Tablet .. 975 milliGRAM(s) Oral every 8 hours  apixaban 5 milliGRAM(s) Oral two times a day  atorvastatin 80 milliGRAM(s) Oral at bedtime  carvedilol 6.25 milliGRAM(s) Oral every 12 hours  clopidogrel Tablet 75 milliGRAM(s) Oral daily  dextrose 5%. 1000 milliLiter(s) (100 mL/Hr) IV Continuous <Continuous>  dextrose 5%. 1000 milliLiter(s) (50 mL/Hr) IV Continuous <Continuous>  dextrose 50% Injectable 25 Gram(s) IV Push once  dextrose 50% Injectable 25 Gram(s) IV Push once  dextrose 50% Injectable 12.5 Gram(s) IV Push once  epoetin ori-epbx (RETACRIT) Injectable 66531 Unit(s) IV Push <User Schedule>  gabapentin 100 milliGRAM(s) Oral three times a day  glucagon  Injectable 1 milliGRAM(s) IntraMuscular once  influenza  Vaccine (HIGH DOSE) 0.7 milliLiter(s) IntraMuscular once  insulin lispro (ADMELOG) corrective regimen sliding scale   SubCutaneous three times a day before meals  insulin lispro (ADMELOG) corrective regimen sliding scale   SubCutaneous at bedtime  piperacillin/tazobactam IVPB.. 4.5 Gram(s) IV Intermittent every 12 hours  sertraline 50 milliGRAM(s) Oral every 24 hours  sodium bicarbonate 1300 milliGRAM(s) Oral every 12 hours    MEDICATIONS  (PRN):  dextrose Oral Gel 15 Gram(s) Oral once PRN Blood Glucose LESS THAN 70 milliGRAM(s)/deciliter  oxyCODONE    IR 7.5 milliGRAM(s) Oral every 6 hours PRN Severe Pain (7 - 10)  oxyCODONE    IR 5 milliGRAM(s) Oral every 6 hours PRN Moderate Pain (4 - 6)      ALLERGIES:  Allergies    vancomycin (Unknown)    Intolerances        LABS:                        6.8    10.59 )-----------( 199      ( 01 Apr 2024 07:14 )             22.4     04-01    133<L>  |  106  |  58<H>  ----------------------------<  170<H>  4.0   |  15<L>  |  4.34<H>    Ca    8.8      01 Apr 2024 07:14  Phos  4.2     04-01  Mg     1.8     04-01    TPro  5.9<L>  /  Alb  3.0<L>  /  TBili  0.2  /  DBili  x   /  AST  23  /  ALT  15  /  AlkPhos  60  04-01      Urinalysis Basic - ( 01 Apr 2024 07:14 )    Color: x / Appearance: x / SG: x / pH: x  Gluc: 170 mg/dL / Ketone: x  / Bili: x / Urobili: x   Blood: x / Protein: x / Nitrite: x   Leuk Esterase: x / RBC: x / WBC x   Sq Epi: x / Non Sq Epi: x / Bacteria: x      CAPILLARY BLOOD GLUCOSE      POCT Blood Glucose.: 172 mg/dL (01 Apr 2024 13:41)      RADIOLOGY & ADDITIONAL TESTS: Reviewed.

## 2024-04-01 NOTE — PROGRESS NOTE ADULT - PROBLEM SELECTOR PLAN 1
Patient c/o right foot pain x 6-7 weeks.  Hx of diabetic foot infection with prior left toe amputation. On doxycycline outpatient x 2 weeks without improvement. Follows with vascular at [facility] for PAD.  On podiatry exam, erythema around R lateral foot wound extending proximally to met base and dorsally to 5th metatarsal; Right lateral 5th metatarsal head with 3 x 2 cm wound with overlying eschar, neg PTB or fluctuance  Labs significant for elevated ESR/CRP, mild leukocytosis.  Podiatry consulted. Given zosyn 3.375g x 1 in the ED. Concern for prior allergy to vancomycin vs Daptomycin?   - XR R foot: No convincing plain radiographic evidence for acute osteomyelitis.  - CT R foot: No CT evidence of osteomyelitis. However, MRI would be a more sensitive test to evaluate for osteomyelitis.  - MRI of R foot (3/26): Osteomyelitis of the fifth metatarsal head and fifth digit proximal phalanx with adjacent soft tissue ulceration and phlegmon.  - hold off on vancomycin or dapto given unclear allergy history and patient stable   - BCx + gram + cocci, Staph Epi.   - Surveillance blood cultures- NTD  - ID consulted, f/u recs   - f/u podiatry recs;  - S/P partial right toe amputation 3/29. Surgical margins appear to be + for Pseudomonas and Morganella   - c/w zosyn w/ pseudomonal dosing. DC'd Linezolid 600 mg. Will need 6 weeks of IV antibiotics.    - F/U susceptibilities for morganella.

## 2024-04-01 NOTE — PROGRESS NOTE ADULT - ASSESSMENT
69 y/o M pmh PVD, DM, COPD presents for right foot wound and cellulitis. Confirmed on MRI Osteomyelitis of Right 5th proximal phalanx and 5th metatarsal head now S/P right partial 5th ray amputation on 3/29/24. OR cx growing pseudomonas.     Plan:   - Surgical site cleansed with normal saline.   - C/w IV abx  - F/U Deep dirty cx swabs x 2  - F/U Clean margin 5th metatarsal bone cx and pathology   - Pt should be Heel WBAT to RLE  - Rest of care per primary team    Plan d/w Attending. Podiatry following.    69 y/o M pmh PVD, DM, COPD presents for right foot wound and cellulitis. Confirmed on MRI Osteomyelitis of Right 5th proximal phalanx and 5th metatarsal head now S/P right partial 5th ray amputation on 3/29/24. OR cx growing pseudomonas.     Plan:   - Surgical site cleansed with normal saline.   - C/w antibiotic per ID  - F/U Deep dirty cx swabs x 2  - F/U Clean margin 5th metatarsal bone cx and pathology   - Pt should be Heel WBAT to RLE  - Rest of care per primary team    Plan d/w Attending. Podiatry following.     Discharge Recommendations:   Pt should keep dressing in tact until follow-up appointment     Pt can follow-up on outpatient basis w/ any podiatrist at the following facility:   [facility] ([facility])  [facility] Location   [address] [place] [address], [address] [address]  [phone]; [phone]  [email or web address]

## 2024-04-01 NOTE — DIETITIAN INITIAL EVALUATION ADULT - OTHER CALCULATIONS
Estimated needs based on IBW as dosing wt 230lb/104.3kg >120% IBW (169%). Needs adjusted for age, BMI, COPD, wound healing, renal, and clinical status.   Defer fluids to team in setting of hyponatremia.

## 2024-04-01 NOTE — DIETITIAN INITIAL EVALUATION ADULT - PROBLEM SELECTOR PLAN 1
#Osteomyelitis R foot  Patient c/o right foot pain x 6-7 weeks.  Hx of diabetic foot infection with prior left toe amputation.  Follows with vascular at [facility] for PAD.  On doxycycline outpatient x 2 weeks without improvement.  On podiatry exam, erythema around R lateral foot wound extending proximally to met base and dorsally to 5th metatarsal; Right lateral 5th metatarsal head with 3 x 2 cm wound with overlying eschar, neg PTB or fluctuance  Labs significant for elevated ESR/CRP, mild leukocytosis.  Podiatry consulted. Given zosyn 3.375g x 1 in the ED. Concern for prior allergy to vancomycin vs Daptomycin?   - f/u blood cultures  - f/u MRI   - XR R foot: No convincing plain radiographic evidence for acute osteomyelitis.  - XR L foot : No tracking gas collections or gross radiographic evidence for osteomyelitis however if concern persists MRI suggested to further assess.  - CT R foot: No CT evidence of osteomyelitis. However, MRI would be a more sensitive test to evaluate for osteomyelitis.  - hold off on vancomycin or dapto given unclear allergy history and patient stable   - c/w zosyn w/ pseudomonal dosing   -  f/u podiatry recs

## 2024-04-01 NOTE — PROGRESS NOTE ADULT - ASSESSMENT
Spoke with pt's daughter over the phone who reviewed pt's labs from his op Nephrologist office sCr baseline 3.8-4.2 (since 6/2023).       Imp: CKD 4/5, at baseline.   sCr baseline 3.8-4.2 (since 6/2023).  Cystatin C 4.3  Hgb  6.8  HCO3 15      Plan:  Transfuse 1PRBC.   Obtain UA, U. Na and UPCR.   Daily labs  Strict I&O  Avoid hypotension, SBP>110 for renal perfusion.   Renal diet  Cont. renally dosed medications (Zosyn)  Cont.  HCO3 tabs and AGUS  T. sat 19, holding IV Fe supplement iso active Osteomyelitis.   No indication for RRT at this point.    68y M w/ CKD5, hx of DM at the present time admitted for Right 5th proximal phalanx and 5th metatarsal head OM now s/p P right partial 5th ray amputation on 3/29/24. OR cultures growing pseudomonas.    1. CKD stage V - currently at baseline.   Spoke with pt's daughter over the phone who reviewed pt's labs from his op Nephrologist office sCr baseline 3.8-4.2 (since   6/2023).   C/w strict I/O   c/w renal diet as tolerated  Recommend to adjust and dose all medications including antibiotics to GFR< 15%  c/w fluid restriction <1.2L/d   Avoid Nephrotoxins as much as possible. No NSAIDs for pain control (no ibuprofen, naproxen, diclofenac)  Avoid trauma at the level of dominant arm  Trend BMP for electrolyte monitoring    2. Anemia of chronic disease - c/b acute drop post op  Maintain hb > 7g/dl  Agree w/ 1 PRBC today  c/w AGUS SQ   Not a candidate for IV iron due to ongoing infection    3. Metabolic acidosis - c/w bicarb 1300mg PO TID     4. Secondary hyperparathyroidism - check phosp and PTH levels    5. R foot OM - now growing Pseudomona spp.  C/w podiatry follow ups as schedule    6. DM - glycemic control per primary team

## 2024-04-01 NOTE — PROGRESS NOTE ADULT - ASSESSMENT
IMPRESSION:  Concern for diabetic foot infection including osteomyelitis.  Proximal margin cultures with pseudomonas and morganella suggesting residual, infected bone is present.  Would treat for OM    Recommend:  1.  Continue Zosyn 4.5 grams IV q12  2.  Can stop Linezolid  3.  Will need 6 weeks of IV antibiotics.  Once I have susceptibilities for morganella I can give final recommendations    ID team 2 will follow

## 2024-04-01 NOTE — DIETITIAN INITIAL EVALUATION ADULT - PROBLEM SELECTOR PLAN 7
Per pt, history of aortic valve replacement ~1-2 years ago.   Unclear about further cardiac history. Pt states he takes Eliquis and Plavix at home. States he took Plavix yesterday (3/25) and stopped plavix 3 days ago.  Upon more collatoral from pt's Cardiologist, Pt has history of MI 1-2 years ago Is on the Eliquis for paraoxysmal afib.   Cardiologist Dr. Adams,    - Plan as above

## 2024-04-01 NOTE — PROGRESS NOTE ADULT - SUBJECTIVE AND OBJECTIVE BOX
Evaluated at bedside, stable. Pt's renal function is currently at baseline.     Allergies:  vancomycin (Unknown)    REVIEW OF SYSTEMS:  All other review of systems is negative unless indicated above.    PHYSICAL EXAM:  Neck: supple; no JVD  Respiratory: CTA B/L  Cardiac: RRR; no M/R/G  Gastrointestinal: abdomen soft, NT/ND; no rebound or guarding  Extremities: no peripheral edema, R foot covered with bandage.   Neurologic: AAOx3; no focal deficits    VITALS:  T(F): 97.9 (04-01-24 @ 11:05), Max: 98.6 (03-31-24 @ 21:36)  HR: 82 (04-01-24 @ 11:05)  BP: 136/66 (04-01-24 @ 11:05)  RR: 18 (04-01-24 @ 11:05)  SpO2: 100% (04-01-24 @ 11:05)  Wt(kg): --    03-31 @ 07:01  -  04-01 @ 07:00  --------------------------------------------------------  IN: 0 mL / OUT: 300 mL / NET: -300 mL          LABS:  04-01    133<L>  |  106  |  58<H>  ----------------------------<  170<H>  4.0   |  15<L>  |  4.34<H>    Ca    8.8      01 Apr 2024 07:14  Phos  4.2     04-01  Mg     1.8     04-01    TPro  5.9<L>  /  Alb  3.0<L>  /  TBili  0.2  /  DBili      /  AST  23  /  ALT  15  /  AlkPhos  60  04-01                          6.8    10.59 )-----------( 199      ( 01 Apr 2024 07:14 )             22.4       acetaminophen     Tablet .. 975 milliGRAM(s) Oral every 8 hours  apixaban 5 milliGRAM(s) Oral two times a day  atorvastatin 80 milliGRAM(s) Oral at bedtime  carvedilol 6.25 milliGRAM(s) Oral every 12 hours  clopidogrel Tablet 75 milliGRAM(s) Oral daily  dextrose 5%. 1000 milliLiter(s) IV Continuous <Continuous>  dextrose 5%. 1000 milliLiter(s) IV Continuous <Continuous>  dextrose 50% Injectable 25 Gram(s) IV Push once  dextrose 50% Injectable 25 Gram(s) IV Push once  dextrose 50% Injectable 12.5 Gram(s) IV Push once  dextrose Oral Gel 15 Gram(s) Oral once PRN  epoetin ori-epbx (RETACRIT) Injectable 56495 Unit(s) IV Push <User Schedule>  gabapentin 100 milliGRAM(s) Oral three times a day  glucagon  Injectable 1 milliGRAM(s) IntraMuscular once  influenza  Vaccine (HIGH DOSE) 0.7 milliLiter(s) IntraMuscular once  insulin lispro (ADMELOG) corrective regimen sliding scale   SubCutaneous three times a day before meals  insulin lispro (ADMELOG) corrective regimen sliding scale   SubCutaneous at bedtime  oxyCODONE    IR 5 milliGRAM(s) Oral every 6 hours PRN  oxyCODONE    IR 7.5 milliGRAM(s) Oral every 6 hours PRN  piperacillin/tazobactam IVPB.. 4.5 Gram(s) IV Intermittent every 12 hours  sertraline 50 milliGRAM(s) Oral every 24 hours  sodium bicarbonate 1300 milliGRAM(s) Oral every 12 hours   Evaluated at bedside, stable. No over the weekend events reported by primary team. No new complaints reported by the pt    UOP: 150-300ml (?)      Allergies:  vancomycin (Unknown)    REVIEW OF SYSTEMS:  All other review of systems is negative unless indicated above.    PHYSICAL EXAM:  Neck: supple; no JVD  Respiratory: CTA B/L  Cardiac: RRR; no M/R/G  Gastrointestinal: abdomen soft, NT/ND; no rebound or guarding  Extremities: no peripheral edema, R foot covered with bandage.   Neurologic: AAOx3; no focal deficits    VITALS:  T(F): 97.9 (04-01-24 @ 11:05), Max: 98.6 (03-31-24 @ 21:36)  HR: 82 (04-01-24 @ 11:05)  BP: 136/66 (04-01-24 @ 11:05)  RR: 18 (04-01-24 @ 11:05)  SpO2: 100% (04-01-24 @ 11:05)  Wt(kg): --    03-31 @ 07:01  -  04-01 @ 07:00  --------------------------------------------------------  IN: 0 mL / OUT: 300 mL / NET: -300 mL          LABS:  04-01    133<L>  |  106  |  58<H>  ----------------------------<  170<H>  4.0   |  15<L>  |  4.34<H>    Ca    8.8      01 Apr 2024 07:14  Phos  4.2     04-01  Mg     1.8     04-01    TPro  5.9<L>  /  Alb  3.0<L>  /  TBili  0.2  /  DBili      /  AST  23  /  ALT  15  /  AlkPhos  60  04-01                          6.8    10.59 )-----------( 199      ( 01 Apr 2024 07:14 )             22.4       acetaminophen     Tablet .. 975 milliGRAM(s) Oral every 8 hours  apixaban 5 milliGRAM(s) Oral two times a day  atorvastatin 80 milliGRAM(s) Oral at bedtime  carvedilol 6.25 milliGRAM(s) Oral every 12 hours  clopidogrel Tablet 75 milliGRAM(s) Oral daily  dextrose 5%. 1000 milliLiter(s) IV Continuous <Continuous>  dextrose 5%. 1000 milliLiter(s) IV Continuous <Continuous>  dextrose 50% Injectable 25 Gram(s) IV Push once  dextrose 50% Injectable 25 Gram(s) IV Push once  dextrose 50% Injectable 12.5 Gram(s) IV Push once  dextrose Oral Gel 15 Gram(s) Oral once PRN  epoetin ori-epbx (RETACRIT) Injectable 58375 Unit(s) IV Push <User Schedule>  gabapentin 100 milliGRAM(s) Oral three times a day  glucagon  Injectable 1 milliGRAM(s) IntraMuscular once  influenza  Vaccine (HIGH DOSE) 0.7 milliLiter(s) IntraMuscular once  insulin lispro (ADMELOG) corrective regimen sliding scale   SubCutaneous three times a day before meals  insulin lispro (ADMELOG) corrective regimen sliding scale   SubCutaneous at bedtime  oxyCODONE    IR 5 milliGRAM(s) Oral every 6 hours PRN  oxyCODONE    IR 7.5 milliGRAM(s) Oral every 6 hours PRN  piperacillin/tazobactam IVPB.. 4.5 Gram(s) IV Intermittent every 12 hours  sertraline 50 milliGRAM(s) Oral every 24 hours  sodium bicarbonate 1300 milliGRAM(s) Oral every 12 hours

## 2024-04-02 VITALS
RESPIRATION RATE: 18 BRPM | HEART RATE: 74 BPM | SYSTOLIC BLOOD PRESSURE: 131 MMHG | TEMPERATURE: 98 F | DIASTOLIC BLOOD PRESSURE: 60 MMHG | OXYGEN SATURATION: 100 %

## 2024-04-02 LAB
ALBUMIN SERPL ELPH-MCNC: 3 G/DL — LOW (ref 3.3–5)
ALP SERPL-CCNC: 57 U/L — SIGNIFICANT CHANGE UP (ref 40–120)
ALT FLD-CCNC: 13 U/L — SIGNIFICANT CHANGE UP (ref 10–45)
ANION GAP SERPL CALC-SCNC: 11 MMOL/L — SIGNIFICANT CHANGE UP (ref 5–17)
AST SERPL-CCNC: 21 U/L — SIGNIFICANT CHANGE UP (ref 10–40)
BASOPHILS # BLD AUTO: 0.04 K/UL — SIGNIFICANT CHANGE UP (ref 0–0.2)
BASOPHILS NFR BLD AUTO: 0.3 % — SIGNIFICANT CHANGE UP (ref 0–2)
BILIRUB SERPL-MCNC: 0.3 MG/DL — SIGNIFICANT CHANGE UP (ref 0.2–1.2)
BUN SERPL-MCNC: 59 MG/DL — HIGH (ref 7–23)
CALCIUM SERPL-MCNC: 8.5 MG/DL — SIGNIFICANT CHANGE UP (ref 8.4–10.5)
CHLORIDE SERPL-SCNC: 106 MMOL/L — SIGNIFICANT CHANGE UP (ref 96–108)
CO2 SERPL-SCNC: 15 MMOL/L — LOW (ref 22–31)
CREAT SERPL-MCNC: 4.32 MG/DL — HIGH (ref 0.5–1.3)
EGFR: 14 ML/MIN/1.73M2 — LOW
EOSINOPHIL # BLD AUTO: 0.21 K/UL — SIGNIFICANT CHANGE UP (ref 0–0.5)
EOSINOPHIL NFR BLD AUTO: 1.8 % — SIGNIFICANT CHANGE UP (ref 0–6)
GLUCOSE BLDC GLUCOMTR-MCNC: 104 MG/DL — HIGH (ref 70–99)
GLUCOSE BLDC GLUCOMTR-MCNC: 133 MG/DL — HIGH (ref 70–99)
GLUCOSE BLDC GLUCOMTR-MCNC: 173 MG/DL — HIGH (ref 70–99)
GLUCOSE SERPL-MCNC: 165 MG/DL — HIGH (ref 70–99)
HCT VFR BLD CALC: 25.3 % — LOW (ref 39–50)
HGB BLD-MCNC: 7.9 G/DL — LOW (ref 13–17)
IMM GRANULOCYTES NFR BLD AUTO: 0.6 % — SIGNIFICANT CHANGE UP (ref 0–0.9)
LYMPHOCYTES # BLD AUTO: 19.3 % — SIGNIFICANT CHANGE UP (ref 13–44)
LYMPHOCYTES # BLD AUTO: 2.31 K/UL — SIGNIFICANT CHANGE UP (ref 1–3.3)
MAGNESIUM SERPL-MCNC: 1.7 MG/DL — SIGNIFICANT CHANGE UP (ref 1.6–2.6)
MCHC RBC-ENTMCNC: 28.2 PG — SIGNIFICANT CHANGE UP (ref 27–34)
MCHC RBC-ENTMCNC: 31.2 GM/DL — LOW (ref 32–36)
MCV RBC AUTO: 90.4 FL — SIGNIFICANT CHANGE UP (ref 80–100)
MONOCYTES # BLD AUTO: 0.99 K/UL — HIGH (ref 0–0.9)
MONOCYTES NFR BLD AUTO: 8.3 % — SIGNIFICANT CHANGE UP (ref 2–14)
NEUTROPHILS # BLD AUTO: 8.34 K/UL — HIGH (ref 1.8–7.4)
NEUTROPHILS NFR BLD AUTO: 69.7 % — SIGNIFICANT CHANGE UP (ref 43–77)
NRBC # BLD: 0 /100 WBCS — SIGNIFICANT CHANGE UP (ref 0–0)
PHOSPHATE SERPL-MCNC: 3.4 MG/DL — SIGNIFICANT CHANGE UP (ref 2.5–4.5)
PLATELET # BLD AUTO: 186 K/UL — SIGNIFICANT CHANGE UP (ref 150–400)
POTASSIUM SERPL-MCNC: 4.4 MMOL/L — SIGNIFICANT CHANGE UP (ref 3.5–5.3)
POTASSIUM SERPL-SCNC: 4.4 MMOL/L — SIGNIFICANT CHANGE UP (ref 3.5–5.3)
PROT SERPL-MCNC: 6.3 G/DL — SIGNIFICANT CHANGE UP (ref 6–8.3)
RBC # BLD: 2.8 M/UL — LOW (ref 4.2–5.8)
RBC # FLD: 14.7 % — HIGH (ref 10.3–14.5)
SODIUM SERPL-SCNC: 132 MMOL/L — LOW (ref 135–145)
WBC # BLD: 11.96 K/UL — HIGH (ref 3.8–10.5)
WBC # FLD AUTO: 11.96 K/UL — HIGH (ref 3.8–10.5)

## 2024-04-02 PROCEDURE — 99233 SBSQ HOSP IP/OBS HIGH 50: CPT | Mod: GC

## 2024-04-02 PROCEDURE — 99232 SBSQ HOSP IP/OBS MODERATE 35: CPT

## 2024-04-02 RX ADMIN — Medication 2: at 13:43

## 2024-04-02 RX ADMIN — OXYCODONE HYDROCHLORIDE 5 MILLIGRAM(S): 5 TABLET ORAL at 09:29

## 2024-04-02 RX ADMIN — OXYCODONE HYDROCHLORIDE 7.5 MILLIGRAM(S): 5 TABLET ORAL at 23:38

## 2024-04-02 RX ADMIN — Medication 1300 MILLIGRAM(S): at 06:08

## 2024-04-02 RX ADMIN — PIPERACILLIN AND TAZOBACTAM 25 GRAM(S): 4; .5 INJECTION, POWDER, LYOPHILIZED, FOR SOLUTION INTRAVENOUS at 23:11

## 2024-04-02 RX ADMIN — APIXABAN 5 MILLIGRAM(S): 2.5 TABLET, FILM COATED ORAL at 18:17

## 2024-04-02 RX ADMIN — Medication 1300 MILLIGRAM(S): at 18:17

## 2024-04-02 RX ADMIN — PIPERACILLIN AND TAZOBACTAM 25 GRAM(S): 4; .5 INJECTION, POWDER, LYOPHILIZED, FOR SOLUTION INTRAVENOUS at 12:27

## 2024-04-02 RX ADMIN — APIXABAN 5 MILLIGRAM(S): 2.5 TABLET, FILM COATED ORAL at 06:08

## 2024-04-02 RX ADMIN — OXYCODONE HYDROCHLORIDE 5 MILLIGRAM(S): 5 TABLET ORAL at 10:24

## 2024-04-02 RX ADMIN — CARVEDILOL PHOSPHATE 6.25 MILLIGRAM(S): 80 CAPSULE, EXTENDED RELEASE ORAL at 18:17

## 2024-04-02 RX ADMIN — PIPERACILLIN AND TAZOBACTAM 25 GRAM(S): 4; .5 INJECTION, POWDER, LYOPHILIZED, FOR SOLUTION INTRAVENOUS at 01:02

## 2024-04-02 RX ADMIN — Medication 975 MILLIGRAM(S): at 06:08

## 2024-04-02 NOTE — PROGRESS NOTE ADULT - SUBJECTIVE AND OBJECTIVE BOX
OVERNIGHT EVENTS: MARIPOSA    SUBJECTIVE / INTERVAL HPI: Patient seen and examined at bedside. Pt states he is feeling ok. Pain in his toes is controlled and did not have any episodes of bleeding last night. Denies fever, chills, abd pain, n/v.     VITAL SIGNS:  Vital Signs Last 24 Hrs  T(C): 37 (02 Apr 2024 08:35), Max: 37 (02 Apr 2024 08:35)  T(F): 98.6 (02 Apr 2024 08:35), Max: 98.6 (02 Apr 2024 08:35)  HR: 88 (02 Apr 2024 08:35) (75 - 106)  BP: 110/57 (02 Apr 2024 08:35) (110/57 - 132/61)  BP(mean): 79 (02 Apr 2024 05:15) (79 - 79)  RR: 18 (02 Apr 2024 08:35) (18 - 18)  SpO2: 100% (02 Apr 2024 08:35) (93% - 100%)    Parameters below as of 02 Apr 2024 08:35  Patient On (Oxygen Delivery Method): nasal cannula  O2 Flow (L/min): 2      PHYSICAL EXAM:    Constitutional: NAD  Head: NC/AT  Eyes: PERRL, EOMI, anicteric sclera  ENT: no nasal discharge; MMM  Neck: supple; no JVD or thyromegaly  Respiratory: CTA B/L; no W/R/R, no retractions; on 2L NC  Cardiac: +S1/S2; RRR; no M/R/G  Gastrointestinal: abdomen soft, +ostomy bag draining billious stool    Extremities: WWP, no clubbing or cyanosis; no peripheral edema.   -per podiatry exam: Erythema around right lateral foot wound extending proximally to met base and dorsally to 5th metatarsal. S/P right toe amputation on 3/29. S/p 2-5 ray amputations on Left  Derm: Surgical site sutures intact with no obvious cardinal signs of active infection or drainage  Vascular: 2+ radial, non-palpable DP/PT pulses B/L (pulses dopplerable)  Neurologic: AAOx3    MEDICATIONS:  MEDICATIONS  (STANDING):  acetaminophen     Tablet .. 975 milliGRAM(s) Oral every 8 hours  apixaban 5 milliGRAM(s) Oral two times a day  atorvastatin 80 milliGRAM(s) Oral at bedtime  carvedilol 6.25 milliGRAM(s) Oral every 12 hours  clopidogrel Tablet 75 milliGRAM(s) Oral daily  dextrose 5%. 1000 milliLiter(s) (100 mL/Hr) IV Continuous <Continuous>  dextrose 5%. 1000 milliLiter(s) (50 mL/Hr) IV Continuous <Continuous>  dextrose 50% Injectable 25 Gram(s) IV Push once  dextrose 50% Injectable 25 Gram(s) IV Push once  dextrose 50% Injectable 12.5 Gram(s) IV Push once  epoetin ori-epbx (RETACRIT) Injectable 27469 Unit(s) IV Push <User Schedule>  gabapentin 100 milliGRAM(s) Oral three times a day  glucagon  Injectable 1 milliGRAM(s) IntraMuscular once  insulin lispro (ADMELOG) corrective regimen sliding scale   SubCutaneous three times a day before meals  insulin lispro (ADMELOG) corrective regimen sliding scale   SubCutaneous at bedtime  piperacillin/tazobactam IVPB.. 4.5 Gram(s) IV Intermittent every 12 hours  sertraline 50 milliGRAM(s) Oral every 24 hours  sodium bicarbonate 1300 milliGRAM(s) Oral every 12 hours    MEDICATIONS  (PRN):  dextrose Oral Gel 15 Gram(s) Oral once PRN Blood Glucose LESS THAN 70 milliGRAM(s)/deciliter  oxyCODONE    IR 5 milliGRAM(s) Oral every 6 hours PRN Moderate Pain (4 - 6)  oxyCODONE    IR 7.5 milliGRAM(s) Oral every 6 hours PRN Severe Pain (7 - 10)      ALLERGIES:  Allergies    vancomycin (Unknown)    Intolerances        LABS:                        7.9    11.96 )-----------( 186      ( 02 Apr 2024 05:30 )             25.3     04-02    132<L>  |  106  |  59<H>  ----------------------------<  165<H>  4.4   |  15<L>  |  4.32<H>    Ca    8.5      02 Apr 2024 05:30  Phos  3.4     04-02  Mg     1.7     04-02    TPro  6.3  /  Alb  3.0<L>  /  TBili  0.3  /  DBili  x   /  AST  21  /  ALT  13  /  AlkPhos  57  04-02      Urinalysis Basic - ( 02 Apr 2024 05:30 )    Color: x / Appearance: x / SG: x / pH: x  Gluc: 165 mg/dL / Ketone: x  / Bili: x / Urobili: x   Blood: x / Protein: x / Nitrite: x   Leuk Esterase: x / RBC: x / WBC x   Sq Epi: x / Non Sq Epi: x / Bacteria: x      CAPILLARY BLOOD GLUCOSE      POCT Blood Glucose.: 138 mg/dL (01 Apr 2024 21:55)      RADIOLOGY & ADDITIONAL TESTS: Reviewed.

## 2024-04-02 NOTE — PROGRESS NOTE ADULT - PROBLEM SELECTOR PLAN 1
Patient c/o right foot pain x 6-7 weeks.  Hx of diabetic foot infection with prior left toe amputation. On doxycycline outpatient x 2 weeks without improvement. Follows with vascular at [facility] for PAD.  On podiatry exam, erythema around R lateral foot wound extending proximally to met base and dorsally to 5th metatarsal; Right lateral 5th metatarsal head with 3 x 2 cm wound with overlying eschar, neg PTB or fluctuance  Labs significant for elevated ESR/CRP, mild leukocytosis.  Podiatry consulted. Given zosyn 3.375g x 1 in the ED. Concern for prior allergy to vancomycin vs Daptomycin?   - XR R foot: No convincing plain radiographic evidence for acute osteomyelitis.  - CT R foot: No CT evidence of osteomyelitis. However, MRI would be a more sensitive test to evaluate for osteomyelitis.  - MRI of R foot (3/26): Osteomyelitis of the fifth metatarsal head and fifth digit proximal phalanx with adjacent soft tissue ulceration and phlegmon.  - BCx + gram + cocci, Staph Epi.   - Surveillance blood cultures- NTD  - S/P partial right toe amputation 3/29. Surgical margins appear to be + for Pseudomonas and Morganella   Plan:  - ID consulted, f/u recs   - f/u podiatry recs;  - Continue Zosyn 4.5 grams IV q12 x 6 weeks (3/29/24 - 5/10/24)  - Needs weekly CBC, CMP, ESR, CRP.  Fax to: [phone]  - Follow up with Dr. Archer as outpatient.

## 2024-04-02 NOTE — PROGRESS NOTE ADULT - SUBJECTIVE AND OBJECTIVE BOX
INTERVAL HPI/OVERNIGHT EVENTS:    Patient was seen and examined at bedside.  No events    CONSTITUTIONAL:  Negative fever or chills, feels well, good appetite  EYES:  Negative  blurry vision or double vision  CARDIOVASCULAR:  Negative for chest pain or palpitations  RESPIRATORY:  Negative for cough, wheezing, or SOB   GASTROINTESTINAL:  Negative for nausea, vomiting, diarrhea, constipation, or abdominal pain  GENITOURINARY:  Negative frequency, urgency or dysuria  NEUROLOGIC:  No headache, confusion, dizziness, lightheadedness      ANTIBIOTICS/RELEVANT:    MEDICATIONS  (STANDING):  acetaminophen     Tablet .. 975 milliGRAM(s) Oral every 8 hours  apixaban 5 milliGRAM(s) Oral two times a day  atorvastatin 80 milliGRAM(s) Oral at bedtime  carvedilol 6.25 milliGRAM(s) Oral every 12 hours  clopidogrel Tablet 75 milliGRAM(s) Oral daily  dextrose 5%. 1000 milliLiter(s) (100 mL/Hr) IV Continuous <Continuous>  dextrose 5%. 1000 milliLiter(s) (50 mL/Hr) IV Continuous <Continuous>  dextrose 50% Injectable 25 Gram(s) IV Push once  dextrose 50% Injectable 25 Gram(s) IV Push once  dextrose 50% Injectable 12.5 Gram(s) IV Push once  epoetin ori-epbx (RETACRIT) Injectable 60710 Unit(s) IV Push <User Schedule>  gabapentin 100 milliGRAM(s) Oral three times a day  glucagon  Injectable 1 milliGRAM(s) IntraMuscular once  insulin lispro (ADMELOG) corrective regimen sliding scale   SubCutaneous at bedtime  insulin lispro (ADMELOG) corrective regimen sliding scale   SubCutaneous three times a day before meals  piperacillin/tazobactam IVPB.. 4.5 Gram(s) IV Intermittent every 12 hours  sertraline 50 milliGRAM(s) Oral every 24 hours  sodium bicarbonate 1300 milliGRAM(s) Oral every 12 hours    MEDICATIONS  (PRN):  dextrose Oral Gel 15 Gram(s) Oral once PRN Blood Glucose LESS THAN 70 milliGRAM(s)/deciliter  oxyCODONE    IR 5 milliGRAM(s) Oral every 6 hours PRN Moderate Pain (4 - 6)  oxyCODONE    IR 7.5 milliGRAM(s) Oral every 6 hours PRN Severe Pain (7 - 10)        Vital Signs Last 24 Hrs  T(C): 37 (02 Apr 2024 08:35), Max: 37 (02 Apr 2024 08:35)  T(F): 98.6 (02 Apr 2024 08:35), Max: 98.6 (02 Apr 2024 08:35)  HR: 88 (02 Apr 2024 08:35) (75 - 106)  BP: 110/57 (02 Apr 2024 08:35) (110/57 - 136/66)  BP(mean): 79 (02 Apr 2024 05:15) (79 - 79)  RR: 18 (02 Apr 2024 08:35) (18 - 18)  SpO2: 100% (02 Apr 2024 08:35) (93% - 100%)    Parameters below as of 02 Apr 2024 08:35  Patient On (Oxygen Delivery Method): nasal cannula  O2 Flow (L/min): 2      PHYSICAL EXAM:  Constitutional: non-toxic, no distress  Eyes:HEATHER, EOMI  Ear/Nose/Throat: no oral lesion, no sinus tenderness on percussion	  Neck:  supple  Respiratory: CTA som  Cardiovascular: S1S2 RRR, no murmurs  Gastrointestinal:soft, (+) BS, no HSM  Extremities:no e/e/c  Vascular: DP Pulse:	right normal; left normal      LABS:                        7.9    11.96 )-----------( 186      ( 02 Apr 2024 05:30 )             25.3     04-02    132<L>  |  106  |  59<H>  ----------------------------<  165<H>  4.4   |  15<L>  |  4.32<H>    Ca    8.5      02 Apr 2024 05:30  Phos  3.4     04-02  Mg     1.7     04-02    TPro  6.3  /  Alb  3.0<L>  /  TBili  0.3  /  DBili  x   /  AST  21  /  ALT  13  /  AlkPhos  57  04-02      Urinalysis Basic - ( 02 Apr 2024 05:30 )    Color: x / Appearance: x / SG: x / pH: x  Gluc: 165 mg/dL / Ketone: x  / Bili: x / Urobili: x   Blood: x / Protein: x / Nitrite: x   Leuk Esterase: x / RBC: x / WBC x   Sq Epi: x / Non Sq Epi: x / Bacteria: x        MICROBIOLOGY:    Culture - Tissue with Gram Stain (03.29.24 @ 16:23)    Gram Stain:   No organisms seen  Rare WBC's   -  Aztreonam: S <=4   -  Cefepime: S <=2   -  Ciprofloxacin: S <=0.25   -  Levofloxacin: S <=0.5   -  Piperacillin/Tazobactam: S <=8   -  Tobramycin: S   Specimen Source: .Tissue Right foot proximal margin fifth metatarsal   Culture Results:   Rare Morganella morganii .... See previous culture for sensitivity.  Moderate Pseudomonas aeruginosa  Result called to and read back by_ Ms. VI Low RN  03/31/2024 12:33:28   Organism Identification: Pseudomonas aeruginosa  Pseudomonas aeruginosa   Organism: Pseudomonas aeruginosa   Organism: Pseudomonas aeruginosa   Method Type: KB   Method Type: SCOTT        RADIOLOGY & ADDITIONAL STUDIES:

## 2024-04-02 NOTE — PROGRESS NOTE ADULT - ASSESSMENT
69 y/o M pmh PVD, DM, COPD presents for right foot wound and cellulitis. Confirmed on MRI Osteomyelitis of Right 5th proximal phalanx and 5th metatarsal head now S/P right partial 5th ray amputation on 3/29/24. OR clean margin bone cx growing pseudomonas and Morganella.     Plan:   - AntibioticS per ID  - Pt should be Heel WBAT to RLE  - Rest of care per primary team    Plan d/w Attending. Podiatry following.     Discharge Recommendations:   Pt should keep dressing intact until follow-up appointment     Pt should follow-up in 1 week w/ any podiatrist at the following facility:   [facility] ([facility])  [facility] Location   [address] [place] [address], [address]  [place] [address] [phone]; [phone]  [email or web address]

## 2024-04-02 NOTE — PROGRESS NOTE ADULT - ASSESSMENT
I M    68 y o M wiht PMH of DM, diabetic wounds s/p left toe amputations, COPD (on home 2L, on BIPAP overnight), CKD (unknown baseline Cr), and colon cancer (w/ colostomy bag) who presents with right toe pain/wound for the past 6-7 weeks, admitted for osteomyelitis of R foot. S/P right partial 5th ray amputation on 3/29/24. Now planned for PICC.     Problem/Plan - 1:  ·  Problem: Foot osteomyelitis.   ·  Plan: Patient c/o right foot pain x 6-7 weeks.  Hx of diabetic foot infection with prior left toe amputation. On doxycycline outpatient x 2 weeks without improvement. Follows with vascular at [facility] for PAD.  On podiatry exam, erythema around R lateral foot wound extending proximally to met base and dorsally to 5th metatarsal; Right lateral 5th metatarsal head with 3 x 2 cm wound with overlying eschar, neg PTB or fluctuance  Labs significant for elevated ESR/CRP, mild leukocytosis.  Podiatry consulted. Given zosyn 3.375g x 1 in the ED. Concern for prior allergy to vancomycin vs Daptomycin?   - XR R foot: No convincing plain radiographic evidence for acute osteomyelitis.  - CT R foot: No CT evidence of osteomyelitis. However, MRI would be a more sensitive test to evaluate for osteomyelitis.  - MRI of R foot (3/26): Osteomyelitis of the fifth metatarsal head and fifth digit proximal phalanx with adjacent soft tissue ulceration and phlegmon.  - BCx + gram + cocci, Staph Epi.   - Surveillance blood cultures- NTD  - S/P partial right toe amputation 3/29. Surgical margins appear to be + for Pseudomonas and Morganella   Plan:  - ID consulted, f/u recs   - f/u podiatry recs;  - Continue Zosyn 4.5 grams IV q12 x 6 weeks (3/29/24 - 5/10/24)  - Needs weekly CBC, CMP, ESR, CRP.  Fax to: [phone]  - Follow up with Dr. Archer as outpatient.    Problem/Plan - 2:  ·  Problem: Anemia.   ·  Plan: Likely AOCD iso chronic kidney disease. No s/s of active bleeding. Per old lab review, Hgb was 9 in 1/2024.   - T iron 27,     - Hgb today 6.8 (4/1) -> Post transfusion 8.1. Currently Hgb 7.9   - S/P Epo 10K units 4/1. Cont q weekly  - Holding IV Fe supplement iso active Osteomyelitis.   - maintain active T&S  - transfuse for hgb < 7.    Problem/Plan - 3:  ·  Problem: Stage 5 chronic kidney disease not on chronic dialysis.   ·  Plan: Patient with hx of CKD, baseline Cr 3.9- 4 per son.   On presentation, BUN/Cr 81/4.20 with metabolic acidosis likely iso acute renal failure, HCO3 16.   Cystatin C 4.3   S/P 500 cc LR bolus. Cr. is uptrending.  cc  Home med: sodium bicarb 650mg bid.    - F/u Renal recs  - avoid nephrotoxic agents.    Problem/Plan - 4:  ·  Problem: Metabolic acidosis.   ·  Plan: Bicarb 16 on admission-> 14. GAP 13 -> 16    - CTM  - C/w 1300 mg Na bicarb BID   - Renal consulted, f/u recs.    Problem/Plan - 5:  ·  Problem: Type 2 diabetes mellitus.   ·  Plan: Patient with hx of diabetes, reportedly last A1c 5. Self-titrates home insulin doses based on blood sugar, but unable to name how much he takes on average.   - A1c 5 (3/26)  - monitor FSG  - moderate ISS.    Problem/Plan - 6:  ·  Problem: Chronic atrial fibrillation.   ·  Plan: Pt with h/o of paroxysmal AFib per Cardiologist. Currently rate controlled.   Home meds: Eliquis 5 bid, coreg 6.25 mg bid     - c/w eliquis and coreg.    Problem/Plan - 7:  ·  Problem: H/O aortic valve replacement.   ·  Plan: Per pt, history of aortic valve replacement ~1-2 years ago.   Unclear about further cardiac history. Pt states he takes Eliquis and Plavix at home. States he took Plavix yesterday (3/25) and stopped plavix 3 days ago.  Upon more collatoral from pt's Cardiologist, Pt has history of MI 1-2 years ago Is on the Eliquis for paraoxysmal afib.   Cardiologist Dr. Adams,    - Plan as above.    Problem/Plan - 8:  ·  Problem: Peripheral arterial disease.   ·  Plan: History of right lower extremity angioplasty in 1/2024.   Per collateral  from Pt's Vascular Surgeon, Pt had a balloon Angioplasty in Proximal AT abd below the knee popliteal   as started on Plavix since with 1 conventional and 1 drug eluting stent.   Home meds: Plavix 75 mg qd    - c/w home meds.    Problem/Plan - 9:  ·  Problem: COPD without exacerbation.   ·  Plan: Hx of COPD, on 2L O2 at home.   No s/s of exacerbation on this admission, satting 100% on home O2. Uses CPAP at nighttime.   - c/w home O2  - c/w CPAP at nighttime.    Problem/Plan - 10:  ·  Problem: History of colon cancer.   ·  Plan; Hx of colon cancer, now with colostomy.   - no active interventions.    Problem/Plan - 11:  ·  Problem: Prophylactic measure.   ·  Plan: F: s/p 500 cc LR  E: Replete as needed, caution due to CKD   N: consistent carb  D: Plavix and Eliquis   Dispo: [facility].

## 2024-04-02 NOTE — PROGRESS NOTE ADULT - SUBJECTIVE AND OBJECTIVE BOX
Physical Medicine and Rehabilitation Progress Note :       Patient is a 68y old  Male who presents with a chief complaint of osteomyelitis of foot (02 Apr 2024 11:23)      HPI:  HPI: Patient is a 69yo M with PMH of Chronic fib, DM, diabetic wounds s/p left toe amputations, COPD (on home 2L, on BIPAP overnight), Stage 5 CKD (baseline Cr 3.9), ?CAD, PAD, and colon cancer (w/ colostomy bag) who presents with right toe pain/wound for the past 6-7 weeks. Reportedly patient had outpatient XR of the foot 3 days ago which were concerning for "bone infection". States he was initially seen by his vascular surgeon, who "ballooned the veins" in his leg to improve blood flow, no stents placed at the time. Has been on doxycycline 100mg bid outpatient for the past 2 weeks without improvement. A few days ago, he developed worsening pain on his right foot with erythema and blistering. Describes intense pain and inability to bear weight on the right foot which brought him in to the ED. Of note, patient reports an allergy to IV antibiotic that causes angioedema and some difficulty breathing, does not recall whether it was vancomycin or daptomycin. Reports he recently switched nephrologists and was started on sodium bicarbonate; per son at bedside, baseline Cr is 4.02. Makes urine. Additionally states he "needs stents " for his heart, but does not currently have any. Uses NC 2L at baseline, CPAP at night. No other complaints at this time.     In the ED:  Vital Signs: T 98.4 F, HR 87, /57, RR 17, SpO2 100% on 2L NC  Labs: significant for ESR 85, CRP 43.3, WBC 10.66, Hgb 7.8, HCO3 16, BUN/Cr 81/4.20, lactate 0.5  XR R foot: pending read; wet read c/f osteomyelitis  CT R foot: pending read  EKG: pending  Interventions: zosyn 3.375g IV x 1, not given vanc due to concern for allergy  Consults: podiatry   (26 Mar 2024 04:45)                            7.9    11.96 )-----------( 186      ( 02 Apr 2024 05:30 )             25.3       04-02    132<L>  |  106  |  59<H>  ----------------------------<  165<H>  4.4   |  15<L>  |  4.32<H>    Ca    8.5      02 Apr 2024 05:30  Phos  3.4     04-02  Mg     1.7     04-02    TPro  6.3  /  Alb  3.0<L>  /  TBili  0.3  /  DBili  x   /  AST  21  /  ALT  13  /  AlkPhos  57  04-02    Vital Signs Last 24 Hrs  T(C): 37 (02 Apr 2024 08:35), Max: 37 (02 Apr 2024 08:35)  T(F): 98.6 (02 Apr 2024 08:35), Max: 98.6 (02 Apr 2024 08:35)  HR: 88 (02 Apr 2024 08:35) (75 - 106)  BP: 110/57 (02 Apr 2024 08:35) (110/57 - 132/61)  BP(mean): 79 (02 Apr 2024 05:15) (79 - 79)  RR: 18 (02 Apr 2024 08:35) (18 - 18)  SpO2: 100% (02 Apr 2024 08:35) (93% - 100%)    Parameters below as of 02 Apr 2024 08:35  Patient On (Oxygen Delivery Method): nasal cannula  O2 Flow (L/min): 2      MEDICATIONS  (STANDING):  acetaminophen     Tablet .. 975 milliGRAM(s) Oral every 8 hours  apixaban 5 milliGRAM(s) Oral two times a day  atorvastatin 80 milliGRAM(s) Oral at bedtime  carvedilol 6.25 milliGRAM(s) Oral every 12 hours  clopidogrel Tablet 75 milliGRAM(s) Oral daily  dextrose 5%. 1000 milliLiter(s) (100 mL/Hr) IV Continuous <Continuous>  dextrose 5%. 1000 milliLiter(s) (50 mL/Hr) IV Continuous <Continuous>  dextrose 50% Injectable 25 Gram(s) IV Push once  dextrose 50% Injectable 25 Gram(s) IV Push once  dextrose 50% Injectable 12.5 Gram(s) IV Push once  epoetin ori-epbx (RETACRIT) Injectable 02236 Unit(s) IV Push <User Schedule>  gabapentin 100 milliGRAM(s) Oral three times a day  glucagon  Injectable 1 milliGRAM(s) IntraMuscular once  insulin lispro (ADMELOG) corrective regimen sliding scale   SubCutaneous three times a day before meals  insulin lispro (ADMELOG) corrective regimen sliding scale   SubCutaneous at bedtime  piperacillin/tazobactam IVPB.. 4.5 Gram(s) IV Intermittent every 12 hours  sertraline 50 milliGRAM(s) Oral every 24 hours  sodium bicarbonate 1300 milliGRAM(s) Oral every 12 hours    MEDICATIONS  (PRN):  dextrose Oral Gel 15 Gram(s) Oral once PRN Blood Glucose LESS THAN 70 milliGRAM(s)/deciliter  oxyCODONE    IR 7.5 milliGRAM(s) Oral every 6 hours PRN Severe Pain (7 - 10)  oxyCODONE    IR 5 milliGRAM(s) Oral every 6 hours PRN Moderate Pain (4 - 6)      T(C): 37 (04-02-24 @ 08:35), Max: 37 (04-02-24 @ 08:35)  HR: 88 (04-02-24 @ 08:35) (75 - 106)  BP: 110/57 (04-02-24 @ 08:35) (110/57 - 132/61)  RR: 18 (04-02-24 @ 08:35) (18 - 18)  SpO2: 100% (04-02-24 @ 08:35) (93% - 100%)        Physical Exam:       Constitutional: NAD    Head: NC/AT    Eyes: PERRL, EOMI, anicteric sclera    ENT: no nasal discharge; MMM    Neck: supple; no JVD or thyromegaly    Respiratory: CTA B/L; no W/R/R, no retractions; on 2L NC    Cardiac: +S1/S2; RRR; no M/R/G    Gastrointestinal: abdomen soft, +ostomy bag draining bilious stool      Extremities:2-5 ray amputations on Left, s/p partial 5th ray amputation Right     Derm: Surgical site sutures intact with no obvious cardinal signs of active infection or drainag     Functional Status Assessment :       Pain Assessment/Number Scale (0-10) Adult  Presence of Pain: denies pain/discomfort (Rating = 0)  Pain Rating (0-10): Rest: 0 (no pain/absence of nonverbal indicators of pain)  Pain Rating (0-10): Activity: 0 (no pain/absence of nonverbal indicators of pain)    Safety    AM-PAC Functional Assessment: Daily Activity  Type of Assessment: Daily assessment  Putting on and taking off regular lower body clothing?: 2 = A lot of assistance  Bathing (including washing, rinsing, drying)?: 2 = A lot of assistance  Toileting, which includes using toilet, bedpan or urinal?: 3 = A little assistance  Putting on and taking off regular upper body clothing?: 3 = A little assistance  Take care of personal grooming such as brushing teeth?: 4 = No assist / stand by assistance  Eating meals?: 4 = No assist / stand by assistance  Score: 18   Row Comment: Ask the patient "How much help from another person do you currently need? (If the patient hasn't done an activity recently, how much help from another person do you think he/she needs if he/she tried?)    Cognitive/Neuro      Cognitive/Neuro/Behavioral  Cognitive/Neuro/Behavioral [WDL Definition: Alert; opens eyes spontaneously; arouses to voice or touch; oriented x 4; follows commands; speech spontaneous, logical; purposeful motor response; behavior appropriate to situation]: WDL  Level of Consciousness: alert  Arousal Level: arouses to voice  Orientation: oriented x 4  Speech: clear;  spontaneous  Mood/Behavior: calm;  cooperative    Language Assistance  Preferred Language to Address Healthcare Preferred Language to Address Healthcare: English    Therapeutic Interventions      Bed Mobility  Bed Mobility Training Rolling/Turning: contact guard;  verbal cues  Bed Mobility Training Scooting: contact guard;  verbal cues  Bed Mobility Training Sit-to-Supine: Patient left seated in bedside chair   Bed Mobility Training Supine-to-Sit: contact guard;  verbal cues  Bed Mobility Training Limitations: decreased flexibility;  decreased strength;  impaired balance;  impaired postural control;  pain    Sit-Stand Transfer Training  Transfer Training Sit-to-Stand Transfer: contact guard;  verbal cues;  b/l heel weight bearing   Transfer Training Stand-to-Sit Transfer: contact guard;  verbal cues;  b/l heel weight bearing   Sit-to-Stand Transfer Training Transfer Safety Analysis: decreased balance;  decreased weight-shifting ability;  decreased flexibility;  decreased strength;  impaired balance;  impaired postural control    Therapeutic Exercise  Therapeutic Exercise Detail: Patient able to transfer from bed to bedside chair with CGA, heel WB BLE utilizing bed and b/l arm rests to assist with support.         PM&R Impression : as above    Current disposition plan recommendation :       - d/c home with home physical and occupational therapy

## 2024-04-02 NOTE — PROGRESS NOTE ADULT - TIME BILLING
treatment of osteomyelitis
evaluation for diabetic foot infection
R foot OM
treatment of diabetic foot infection
Bedside exam and interview    Review of hospital course, labs, vitals, imaging ,  medical records.   Reviewing outside records   Discharge planning on Interdisciplinary rounds   Discussion with consultants and Primary team   Documenting the encounter.
Bedside exam and interview    Review of hospital course, labs, vitals, imaging ,  medical records.   Reviewing outside records   Discharge planning on Interdisciplinary rounds   Discussion with consultants and Primary team   Documenting the encounter.
Evaluation of patient, review of chart
Bedside exam and interview    Review of hospital course, labs, vitals, imaging ,  medical records.   Reviewing outside records   Discharge planning on Interdisciplinary rounds   Discussion with consultants and Primary team   Documenting the encounter.
Bedside exam and interview    Review of hospital course, labs, vitals, imaging ,  medical records.   Reviewing outside records   Discharge planning on Interdisciplinary rounds   Discussion with consultants and Primary team   Documenting the encounter.

## 2024-04-02 NOTE — PROGRESS NOTE ADULT - ASSESSMENT
Patient is a 67yo M wiht PMH of DM, diabetic wounds s/p left toe amputations, COPD (on home 2L, on BIPAP overnight), CKD (unknown baseline Cr), and colon cancer (w/ colostomy bag) who presents with right toe pain/wound for the past 6-7 weeks, admitted for osteomyelitis of R foot. S/P right partial 5th ray amputation on 3/29/24. Now planned for PICC.

## 2024-04-02 NOTE — PROGRESS NOTE ADULT - PROBLEM SELECTOR PLAN 4
Bicarb 16 on admission-> 14. GAP 13 -> 16    - CTM  - C/w 1300 mg Na bicarb BID   - Renal consulted, f/u recs

## 2024-04-02 NOTE — PROGRESS NOTE ADULT - ASSESSMENT
IMPRESSION:  Concern for diabetic foot infection including osteomyelitis.  Proximal margin cultures with pseudomonas and morganella suggesting residual, infected bone is present.  Would treat for OM    Recommend:  1.  Continue Zosyn 4.5 grams IV q12 x 6 weeks (3/29/24 - 5/10/24)  2.  Needs weekly CBC, CMP, ESR, CRP.  Fax to: [phone]  3.  Can follow up with Dr. Archer as outpatient.  [address], [address] floor, [place], [place]    ID team 2 will sign off.  Reconsult as needed

## 2024-04-02 NOTE — PROGRESS NOTE ADULT - SUBJECTIVE AND OBJECTIVE BOX
Patient is a 68y old  Male who presents with a chief complaint of osteomyelitis of foot (01 Apr 2024 15:51)      INTERVAL HPI/ OVERNIGHT EVENTS: Pt seen and examined bedside by podiatry team and podiatry attending Dr. Dhillon. Pt complaining of pain at amputation site however pt refused dressing change, stated that since it was bleeding so much in the last few days he would rather we do not change the dressing.       LABS                        7.9    11.96 )-----------( 186      ( 02 Apr 2024 05:30 )             25.3     04-02    132<L>  |  106  |  59<H>  ----------------------------<  165<H>  4.4   |  15<L>  |  4.32<H>    Ca    8.5      02 Apr 2024 05:30  Phos  3.4     04-02  Mg     1.7     04-02    TPro  6.3  /  Alb  3.0<L>  /  TBili  0.3  /  DBili  x   /  AST  21  /  ALT  13  /  AlkPhos  57  04-02        ICU Vital Signs Last 24 Hrs  T(C): 37 (02 Apr 2024 08:35), Max: 37 (02 Apr 2024 08:35)  T(F): 98.6 (02 Apr 2024 08:35), Max: 98.6 (02 Apr 2024 08:35)  HR: 88 (02 Apr 2024 08:35) (75 - 106)  BP: 110/57 (02 Apr 2024 08:35) (110/57 - 136/66)  BP(mean): 79 (02 Apr 2024 05:15) (79 - 79)  ABP: --  ABP(mean): --  RR: 18 (02 Apr 2024 08:35) (18 - 18)  SpO2: 100% (02 Apr 2024 08:35) (93% - 100%)    O2 Parameters below as of 02 Apr 2024 08:35  Patient On (Oxygen Delivery Method): nasal cannula  O2 Flow (L/min): 2      RADIOLOGY:  < from: Xray Foot AP + Lateral + Oblique, Right (03.29.24 @ 17:34) >    IMPRESSION:  Currently status post partial 5th ray resection through mid metatarsal   shaft with sharp smooth osseous stump margin and with postsurgical   changes in the overlying soft tissues.    No proximally tracking gas collections beyond the amputation margins and   no focal areas of osteomyelitis.    Remainder of foot unchanged. Also correlate with intraoperative findings.    --- End of Report ---    < end of copied text >      MICROBIOLOGY:  Culture - Tissue with Gram Stain (03.29.24 @ 16:23)    Gram Stain:   No organisms seen  Rare WBC's   -  Aztreonam: S <=4   -  Cefepime: S <=2   -  Ciprofloxacin: S <=0.25   -  Levofloxacin: S <=0.5   -  Piperacillin/Tazobactam: S <=8   -  Tobramycin: S   Specimen Source: .Tissue Right foot proximal margin fifth metatarsal   Culture Results:   Rare Morganella morganii .... See previous culture for sensitivity.  Moderate Pseudomonas aeruginosa  Result called to and read back by_ Ms. VI Low RN  03/31/2024 12:33:28   Organism Identification: Pseudomonas aeruginosa  Pseudomonas aeruginosa   Organism: Pseudomonas aeruginosa   Organism: Pseudomonas aeruginosa   Method Type: KB   Method Type: SCOTT    PHYSICAL EXAM (From 4/1, pt refused dressing change 4/2)   Lower Extremity Focused  Vasc: PT/DP non-palpable b/l -bipasic waveforms heard at b/l DP and monophasic waveforms heard at b/l PT; no erythema or edema   Derm: Surgical site sutures intact with macerated edges - no active drainage, no malodor with no   Neuro: protective sensation in tact  MSK: s/p 2-5 ray amputations on Left, s/p partial 5th ray amputation Right

## 2024-04-02 NOTE — PROGRESS NOTE ADULT - ATTENDING COMMENTS
Patient was seen and examined at bedside on 3/31/2024 at 12 pm with Rabbi present. Patient feels that pain in his R foot persists but is improved from yesterday. Denies chest pain, SOB, N/V, abdominal pain. ROS is otherwise negative. Vitals, labwork and pertinent imaging reviewed. Exam - NAD, AAO x 4, PERRLA, EOMI, MMM, supple neck, chest - CTA b/l, CV - rrr, s1s2, no m/r/g, abd - soft, NTND, + BS, ext - wwp, RLE wrapped, psych - normal affect    Plan:  -C/w Zosyn, add Linezolid as per ID recs  -MRI showing OM w/ phlegmon - will d/w Podiatry for possible intervention/biopsy - s/p amputation; unfortunately clean margins appear to have Pseudomonas - will d/w ID regarding duration and abx choice  -Check b/l venous and arterial US - both grossly unremarkable  -Renal consulted to establish care, given acidosis, Cr has improved  -Repeat BCX today, currently growing MRSE (likely contaminant)
69 yo man with CKD 4 admitted for management of R foot osteomyelitis currently on Zosyn/Linezolid  h/o PAD, COPD, on home O2, s/p colectomy  with colostomy for colon cancer,   creat stable range 4.04,   case reviewed with pt's daughter on phone at bedside  metabolic acidosis bicarb still low 14- would increase NaHCO3 to 2 tabs BID  anemia- was to start AGUS as outpt per daughter- will check TSat and if okay start AGUS--  If TSat < 20 will need IVFe once infection better controlled
69 yo man with CKD 4 admitted for management of R foot osteomyelitis currently on Zosyn/Linezolid  h/o PAD, COPD, on home O2, s/p colectomy  with colostomy for colon cancer.  Now for amputation of affected toes R foot  creat mild uptick 4.34  still needs NaHCO3 increased to 2 BID from 2 BID  keep hydrated  anemia- was to start AGUS as outpt per daughter- will check TSat and if okay start AGUS--  If TSat < 20 will need IVFe once infection better controlled
CKD 4 -- creat slt up -- could be a bit dry   r/o retention with bladder scan  increase nahco3 ]  make consider IVF if creat cont increase   EPO
ARF on CKD may be on dry side  for bladder scan to r/o retention  f/u chem today -- may need IVF if creat not improved
Seen and evaluated at bedside. Admitted for Right 5th proximal phalanx and 5th metatarsal head OM now S/P right partial 5th ray amputation on 3/29/24 with OR cx growing pseudomonas.  Has hx of CKD stage V currently at baseline.   Acceptable lytes and volume status- no indications for RRT at present  Further recs as above    Discussed with primary team
Patient was seen and examined at bedside on 3/27/2024 at 2 pm with family present. Patient feels that pain in his R foot persists. Denies chest pain, SOB, N/V, abdominal pain. ROS is otherwise negative. Vitals, labwork and pertinent imaging reviewed. Exam - NAD, AAO x 4, PERRLA, EOMI, MMM, supple neck, chest - CTA b/l, CV - rrr, s1s2, no m/r/g, abd - soft, NTND, + BS, ext - wwp, RLE w/ Right lateral 5th metatarsal head with 3 x 2 cm wound with overlying eschar, psych - normal affect    Plan:  -C/w Zosyn, add Linezolid  -MRI showing OM w/ phlegmon - will d/w Podiatry for possible intervention/biopsy - rec amputation which patient is accepting  -Check b/l venous and arterial US - both grossly unremarkable  -ID consulted - rec Zyvox and Zosyn  -Renal consulted to establish care, given acidosis  -Repeat BCX today, currently growing MRSE (possible contaminant)
patient seen and examined by me on 3/29 around 10am with resident team   plan discussed with podiatry attending , Nephrology attending and ID attending     # Acute Osteomyelitis - Continue IV zosyn + Oral Linezolid ,  Podiatry planning for 5th digit Ray amputation  today   - patient medically optimized for low risk surgery with risk of MACE between 0.4 and 6% within 30 days ,  hold eliquis and plavix prior to surgery and restart post op , if planning for GA use CPAP as patient has ZAYDA,    # CKD stage 5 , not on dialysis , F/u Nephrology recommendations   # RTA due to CKD , continue po bicarbonate     # MRSE Blood cx + 1/4 bottles - most likely contaminant , Repeat blood cx negative for 2 days     # PAD - Arterial US without significant abnormalities     # Chronic Atrial Fibrillation , continue Eliquis     # Hx of Colon Cancer s/p Colostomy   # COPD without exacerbation   # Obstructive Sleep Apnea - CPAP at bed time   # Anemia of Chronic Kidney Disease   # NIDDM
patient seen and examined by me on 4/1 around 10am with resident team   plan discussed with podiatry attending , Nephrology attending and ID attending     # Acute Osteomyelitis - Continue IV zosyn ,  S/p 5th Toe Partial Ray amputation on 3/29 , Clean margin cx + for Pseudomonas and Morganella       # CKD stage 5 , not on dialysis , F/u Nephrology recommendations   # RTA due to CKD , continue po bicarbonate     # MRSE Blood cx + 1/4 bottles - most likely contaminant , Repeat blood cx negative for 2 days     # PAD - Arterial US without significant abnormalities     # Chronic Atrial Fibrillation , continue Eliquis     # Hx of Colon Cancer s/p Colostomy   # COPD without exacerbation   # Obstructive Sleep Apnea - CPAP at bed time   # Anemia of Chronic Kidney Disease  - 1 unit prbc transfusion , EPO injection   # NIDDM .
patient seen and examined by me on 4/2 with resident team   plan discussed with podiatry attending , Nephrology attending and ID attending     # Acute Osteomyelitis - Continue IV zosyn ,  S/p 5th Toe Partial Ray amputation on 3/29 , Clean margin cx + for Pseudomonas and Morganella , Awaiting PICC and home OPAT arrangement       # CKD stage 5 , not on dialysis , F/u Nephrology recommendations   # RTA due to CKD , continue po bicarbonate     # MRSE Blood cx + 1/4 bottles - most likely contaminant , Repeat blood cx negative for 2 days     # PAD - Arterial US without significant abnormalities     # Chronic Atrial Fibrillation , continue Eliquis     # Hx of Colon Cancer s/p Colostomy   # COPD without exacerbation   # Obstructive Sleep Apnea - CPAP at bed time   # Anemia of Chronic Kidney Disease  - 1 unit prbc transfusion , EPO injection  on 4/1   # NIDDM .
patient seen and examined by me on 3/28 around 10am with resident team   plan discussed with podiatry attending , Nephrology attending and ID attending     # Acute Osteomyelitis - Continue IV zosyn + Oral Linezolid ,  Podiatry planning for 5th digit Ray amputation on 3/29   # CKD stage 5 , not on dialysis , F/u Nephrology recommendations   # RTA due to CKD , continue po bicarbonate   # MRSE Blood cx + 1/4 bottles - most likely contaminant , F/u repeat blood cx   # PAD - Arterial US without significant abnormalities   # Chronic Atrial Fibrillation , continue Eliquis   # Hx of Colon Cancer s/p Colostomy   # COPD without exacerbation   # Obstructive Sleep Apnea - CPAP at bed time   # Anemia of Chronic Kidney Disease   # NIDDM

## 2024-04-03 ENCOUNTER — TRANSCRIPTION ENCOUNTER (OUTPATIENT)
Age: 69
End: 2024-04-03

## 2024-04-03 PROBLEM — I73.9 PERIPHERAL VASCULAR DISEASE, UNSPECIFIED: Chronic | Status: ACTIVE | Noted: 2024-03-26

## 2024-04-03 PROBLEM — N18.9 CHRONIC KIDNEY DISEASE, UNSPECIFIED: Chronic | Status: ACTIVE | Noted: 2024-03-26

## 2024-04-03 PROBLEM — J44.9 CHRONIC OBSTRUCTIVE PULMONARY DISEASE, UNSPECIFIED: Chronic | Status: ACTIVE | Noted: 2024-03-26

## 2024-04-03 PROBLEM — E11.621 TYPE 2 DIABETES MELLITUS WITH FOOT ULCER: Chronic | Status: ACTIVE | Noted: 2024-03-26

## 2024-04-03 PROBLEM — I25.10 ATHEROSCLEROTIC HEART DISEASE OF NATIVE CORONARY ARTERY WITHOUT ANGINA PECTORIS: Chronic | Status: ACTIVE | Noted: 2024-03-26

## 2024-04-03 PROBLEM — E11.9 TYPE 2 DIABETES MELLITUS WITHOUT COMPLICATIONS: Chronic | Status: ACTIVE | Noted: 2024-03-26

## 2024-04-03 LAB
ALBUMIN SERPL ELPH-MCNC: 3.1 G/DL — LOW (ref 3.3–5)
ALP SERPL-CCNC: 63 U/L — SIGNIFICANT CHANGE UP (ref 40–120)
ALT FLD-CCNC: 15 U/L — SIGNIFICANT CHANGE UP (ref 10–45)
ANION GAP SERPL CALC-SCNC: 6 MMOL/L — SIGNIFICANT CHANGE UP (ref 5–17)
AST SERPL-CCNC: 20 U/L — SIGNIFICANT CHANGE UP (ref 10–40)
BASOPHILS # BLD AUTO: 0.04 K/UL — SIGNIFICANT CHANGE UP (ref 0–0.2)
BASOPHILS NFR BLD AUTO: 0.4 % — SIGNIFICANT CHANGE UP (ref 0–2)
BILIRUB SERPL-MCNC: 0.2 MG/DL — SIGNIFICANT CHANGE UP (ref 0.2–1.2)
BUN SERPL-MCNC: 57 MG/DL — HIGH (ref 7–23)
CALCIUM SERPL-MCNC: 8.6 MG/DL — SIGNIFICANT CHANGE UP (ref 8.4–10.5)
CHLORIDE SERPL-SCNC: 104 MMOL/L — SIGNIFICANT CHANGE UP (ref 96–108)
CO2 SERPL-SCNC: 20 MMOL/L — LOW (ref 22–31)
CREAT SERPL-MCNC: 4.27 MG/DL — HIGH (ref 0.5–1.3)
EGFR: 14 ML/MIN/1.73M2 — LOW
EOSINOPHIL # BLD AUTO: 0.21 K/UL — SIGNIFICANT CHANGE UP (ref 0–0.5)
EOSINOPHIL NFR BLD AUTO: 2.3 % — SIGNIFICANT CHANGE UP (ref 0–6)
GLUCOSE BLDC GLUCOMTR-MCNC: 139 MG/DL — HIGH (ref 70–99)
GLUCOSE BLDC GLUCOMTR-MCNC: 161 MG/DL — HIGH (ref 70–99)
GLUCOSE BLDC GLUCOMTR-MCNC: 265 MG/DL — HIGH (ref 70–99)
GLUCOSE SERPL-MCNC: 136 MG/DL — HIGH (ref 70–99)
HCT VFR BLD CALC: 24.5 % — LOW (ref 39–50)
HGB BLD-MCNC: 7.5 G/DL — LOW (ref 13–17)
IMM GRANULOCYTES NFR BLD AUTO: 0.7 % — SIGNIFICANT CHANGE UP (ref 0–0.9)
LYMPHOCYTES # BLD AUTO: 2.97 K/UL — SIGNIFICANT CHANGE UP (ref 1–3.3)
LYMPHOCYTES # BLD AUTO: 32.6 % — SIGNIFICANT CHANGE UP (ref 13–44)
MAGNESIUM SERPL-MCNC: 1.8 MG/DL — SIGNIFICANT CHANGE UP (ref 1.6–2.6)
MCHC RBC-ENTMCNC: 28.1 PG — SIGNIFICANT CHANGE UP (ref 27–34)
MCHC RBC-ENTMCNC: 30.6 GM/DL — LOW (ref 32–36)
MCV RBC AUTO: 91.8 FL — SIGNIFICANT CHANGE UP (ref 80–100)
MONOCYTES # BLD AUTO: 0.92 K/UL — HIGH (ref 0–0.9)
MONOCYTES NFR BLD AUTO: 10.1 % — SIGNIFICANT CHANGE UP (ref 2–14)
NEUTROPHILS # BLD AUTO: 4.9 K/UL — SIGNIFICANT CHANGE UP (ref 1.8–7.4)
NEUTROPHILS NFR BLD AUTO: 53.9 % — SIGNIFICANT CHANGE UP (ref 43–77)
NRBC # BLD: 0 /100 WBCS — SIGNIFICANT CHANGE UP (ref 0–0)
PHOSPHATE SERPL-MCNC: 3.6 MG/DL — SIGNIFICANT CHANGE UP (ref 2.5–4.5)
PLATELET # BLD AUTO: 178 K/UL — SIGNIFICANT CHANGE UP (ref 150–400)
POTASSIUM SERPL-MCNC: 4.4 MMOL/L — SIGNIFICANT CHANGE UP (ref 3.5–5.3)
POTASSIUM SERPL-SCNC: 4.4 MMOL/L — SIGNIFICANT CHANGE UP (ref 3.5–5.3)
PROT SERPL-MCNC: 6.3 G/DL — SIGNIFICANT CHANGE UP (ref 6–8.3)
RBC # BLD: 2.67 M/UL — LOW (ref 4.2–5.8)
RBC # FLD: 14.6 % — HIGH (ref 10.3–14.5)
SODIUM SERPL-SCNC: 130 MMOL/L — LOW (ref 135–145)
WBC # BLD: 9.1 K/UL — SIGNIFICANT CHANGE UP (ref 3.8–10.5)
WBC # FLD AUTO: 9.1 K/UL — SIGNIFICANT CHANGE UP (ref 3.8–10.5)

## 2024-04-03 PROCEDURE — 71045 X-RAY EXAM CHEST 1 VIEW: CPT

## 2024-04-03 PROCEDURE — 86923 COMPATIBILITY TEST ELECTRIC: CPT

## 2024-04-03 PROCEDURE — 83735 ASSAY OF MAGNESIUM: CPT

## 2024-04-03 PROCEDURE — 73718 MRI LOWER EXTREMITY W/O DYE: CPT | Mod: MC

## 2024-04-03 PROCEDURE — 80048 BASIC METABOLIC PNL TOTAL CA: CPT

## 2024-04-03 PROCEDURE — 80053 COMPREHEN METABOLIC PANEL: CPT

## 2024-04-03 PROCEDURE — 99285 EMERGENCY DEPT VISIT HI MDM: CPT | Mod: 25

## 2024-04-03 PROCEDURE — 36573 INSJ PICC RS&I 5 YR+: CPT

## 2024-04-03 PROCEDURE — 93970 EXTREMITY STUDY: CPT

## 2024-04-03 PROCEDURE — 73620 X-RAY EXAM OF FOOT: CPT

## 2024-04-03 PROCEDURE — 85730 THROMBOPLASTIN TIME PARTIAL: CPT

## 2024-04-03 PROCEDURE — 84466 ASSAY OF TRANSFERRIN: CPT

## 2024-04-03 PROCEDURE — C9399: CPT

## 2024-04-03 PROCEDURE — 82962 GLUCOSE BLOOD TEST: CPT

## 2024-04-03 PROCEDURE — 86901 BLOOD TYPING SEROLOGIC RH(D): CPT

## 2024-04-03 PROCEDURE — 87150 DNA/RNA AMPLIFIED PROBE: CPT

## 2024-04-03 PROCEDURE — 84100 ASSAY OF PHOSPHORUS: CPT

## 2024-04-03 PROCEDURE — 96376 TX/PRO/DX INJ SAME DRUG ADON: CPT

## 2024-04-03 PROCEDURE — 85610 PROTHROMBIN TIME: CPT

## 2024-04-03 PROCEDURE — 99232 SBSQ HOSP IP/OBS MODERATE 35: CPT

## 2024-04-03 PROCEDURE — 85027 COMPLETE CBC AUTOMATED: CPT

## 2024-04-03 PROCEDURE — 87075 CULTR BACTERIA EXCEPT BLOOD: CPT

## 2024-04-03 PROCEDURE — 86140 C-REACTIVE PROTEIN: CPT

## 2024-04-03 PROCEDURE — 87186 SC STD MICRODIL/AGAR DIL: CPT

## 2024-04-03 PROCEDURE — P9016: CPT

## 2024-04-03 PROCEDURE — 97161 PT EVAL LOW COMPLEX 20 MIN: CPT

## 2024-04-03 PROCEDURE — 83036 HEMOGLOBIN GLYCOSYLATED A1C: CPT

## 2024-04-03 PROCEDURE — 96374 THER/PROPH/DIAG INJ IV PUSH: CPT

## 2024-04-03 PROCEDURE — 83550 IRON BINDING TEST: CPT

## 2024-04-03 PROCEDURE — 97164 PT RE-EVAL EST PLAN CARE: CPT

## 2024-04-03 PROCEDURE — 96372 THER/PROPH/DIAG INJ SC/IM: CPT

## 2024-04-03 PROCEDURE — 88311 DECALCIFY TISSUE: CPT

## 2024-04-03 PROCEDURE — 99239 HOSP IP/OBS DSCHRG MGMT >30: CPT

## 2024-04-03 PROCEDURE — 83540 ASSAY OF IRON: CPT

## 2024-04-03 PROCEDURE — 36569 INSJ PICC 5 YR+ W/O IMAGING: CPT

## 2024-04-03 PROCEDURE — 87070 CULTURE OTHR SPECIMN AEROBIC: CPT

## 2024-04-03 PROCEDURE — 73700 CT LOWER EXTREMITY W/O DYE: CPT | Mod: MC

## 2024-04-03 PROCEDURE — 82550 ASSAY OF CK (CPK): CPT

## 2024-04-03 PROCEDURE — 93925 LOWER EXTREMITY STUDY: CPT

## 2024-04-03 PROCEDURE — 82728 ASSAY OF FERRITIN: CPT

## 2024-04-03 PROCEDURE — 73630 X-RAY EXAM OF FOOT: CPT

## 2024-04-03 PROCEDURE — 87040 BLOOD CULTURE FOR BACTERIA: CPT

## 2024-04-03 PROCEDURE — 36430 TRANSFUSION BLD/BLD COMPNT: CPT

## 2024-04-03 PROCEDURE — 93005 ELECTROCARDIOGRAM TRACING: CPT

## 2024-04-03 PROCEDURE — 87184 SC STD DISK METHOD PER PLATE: CPT

## 2024-04-03 PROCEDURE — 86900 BLOOD TYPING SEROLOGIC ABO: CPT

## 2024-04-03 PROCEDURE — 36415 COLL VENOUS BLD VENIPUNCTURE: CPT

## 2024-04-03 PROCEDURE — 97165 OT EVAL LOW COMPLEX 30 MIN: CPT

## 2024-04-03 PROCEDURE — 85025 COMPLETE CBC W/AUTO DIFF WBC: CPT

## 2024-04-03 PROCEDURE — 83605 ASSAY OF LACTIC ACID: CPT

## 2024-04-03 PROCEDURE — 88307 TISSUE EXAM BY PATHOLOGIST: CPT

## 2024-04-03 PROCEDURE — 97530 THERAPEUTIC ACTIVITIES: CPT

## 2024-04-03 PROCEDURE — 82610 CYSTATIN C: CPT

## 2024-04-03 PROCEDURE — 85652 RBC SED RATE AUTOMATED: CPT

## 2024-04-03 PROCEDURE — 86850 RBC ANTIBODY SCREEN: CPT

## 2024-04-03 RX ORDER — GABAPENTIN 400 MG/1
1 CAPSULE ORAL
Qty: 90 | Refills: 0
Start: 2024-04-03 | End: 2024-05-02

## 2024-04-03 RX ORDER — CARVEDILOL PHOSPHATE 80 MG/1
1 CAPSULE, EXTENDED RELEASE ORAL
Qty: 0 | Refills: 0 | DISCHARGE
Start: 2024-04-03

## 2024-04-03 RX ORDER — SODIUM BICARBONATE 1 MEQ/ML
2 SYRINGE (ML) INTRAVENOUS
Qty: 180 | Refills: 0
Start: 2024-04-03 | End: 2024-05-02

## 2024-04-03 RX ORDER — CHLORHEXIDINE GLUCONATE 213 G/1000ML
1 SOLUTION TOPICAL
Refills: 0 | Status: DISCONTINUED | OUTPATIENT
Start: 2024-04-03 | End: 2024-04-03

## 2024-04-03 RX ORDER — SODIUM CHLORIDE 9 MG/ML
10 INJECTION INTRAMUSCULAR; INTRAVENOUS; SUBCUTANEOUS
Refills: 0 | Status: DISCONTINUED | OUTPATIENT
Start: 2024-04-03 | End: 2024-04-03

## 2024-04-03 RX ORDER — OXYCODONE HYDROCHLORIDE 5 MG/1
1 TABLET ORAL
Qty: 0 | Refills: 0 | DISCHARGE
Start: 2024-04-03

## 2024-04-03 RX ORDER — INSULIN LISPRO 100/ML
1 VIAL (ML) SUBCUTANEOUS ONCE
Refills: 0 | Status: COMPLETED | OUTPATIENT
Start: 2024-04-03 | End: 2024-04-03

## 2024-04-03 RX ORDER — PIPERACILLIN AND TAZOBACTAM 4; .5 G/20ML; G/20ML
4.5 INJECTION, POWDER, LYOPHILIZED, FOR SOLUTION INTRAVENOUS
Qty: 0 | Refills: 0 | DISCHARGE
Start: 2024-04-03

## 2024-04-03 RX ORDER — OXYCODONE HYDROCHLORIDE 5 MG/1
1 TABLET ORAL
Qty: 15 | Refills: 0
Start: 2024-04-03 | End: 2024-04-07

## 2024-04-03 RX ORDER — SODIUM BICARBONATE 1 MEQ/ML
1 SYRINGE (ML) INTRAVENOUS
Refills: 0 | DISCHARGE

## 2024-04-03 RX ORDER — ERYTHROPOIETIN 10000 [IU]/ML
10000 INJECTION, SOLUTION INTRAVENOUS; SUBCUTANEOUS
Qty: 0 | Refills: 0 | DISCHARGE
Start: 2024-04-03

## 2024-04-03 RX ADMIN — Medication 2: at 13:33

## 2024-04-03 RX ADMIN — PIPERACILLIN AND TAZOBACTAM 25 GRAM(S): 4; .5 INJECTION, POWDER, LYOPHILIZED, FOR SOLUTION INTRAVENOUS at 13:35

## 2024-04-03 RX ADMIN — CLOPIDOGREL BISULFATE 75 MILLIGRAM(S): 75 TABLET, FILM COATED ORAL at 13:35

## 2024-04-03 RX ADMIN — OXYCODONE HYDROCHLORIDE 7.5 MILLIGRAM(S): 5 TABLET ORAL at 00:38

## 2024-04-03 NOTE — PROGRESS NOTE ADULT - SUBJECTIVE AND OBJECTIVE BOX
Seen and evaluated at bedside, no overnight events reported by primary team. No complaints reported by the pt    UOP: 300ml     REVIEW OF SYSTEMS  --------------------------------------------------------------------------------  Gen: No fevers/chills, weakness  Skin: No rashes  Head/Eyes/Ears/Mouth: No headache; No hearing changes, mucous discharge, vision changes  Respiratory: No dyspnea, cough, wheezing  CV: No chest pain, palpitations  GI: No abdominal pain, diarrhea, constipation, nausea, vomiting, melena, hematochezia  : No increased frequency, dysuria, hematuria  MSK: No joint pain/swelling; no back pain; no edema  Neuro: No dizziness/lightheadedness, weakness, seizures, numbness  Heme: No easy bruising or bleeding    All other systems were reviewed and are negative, except as noted.    PAST HISTORY  --------------------------------------------------------------------------------  No significant changes to PMH, PSH, FHx, SHx, unless otherwise noted    ALLERGIES & MEDICATIONS  --------------------------------------------------------------------------------  Allergies    vancomycin (Unknown)    Intolerances      Standing Inpatient Medications  acetaminophen     Tablet .. 975 milliGRAM(s) Oral every 8 hours  apixaban 5 milliGRAM(s) Oral two times a day  atorvastatin 80 milliGRAM(s) Oral at bedtime  carvedilol 6.25 milliGRAM(s) Oral every 12 hours  clopidogrel Tablet 75 milliGRAM(s) Oral daily  dextrose 5%. 1000 milliLiter(s) IV Continuous <Continuous>  dextrose 5%. 1000 milliLiter(s) IV Continuous <Continuous>  dextrose 50% Injectable 25 Gram(s) IV Push once  dextrose 50% Injectable 25 Gram(s) IV Push once  dextrose 50% Injectable 12.5 Gram(s) IV Push once  epoetin ori-epbx (RETACRIT) Injectable 45732 Unit(s) IV Push <User Schedule>  gabapentin 100 milliGRAM(s) Oral three times a day  glucagon  Injectable 1 milliGRAM(s) IntraMuscular once  insulin lispro (ADMELOG) corrective regimen sliding scale   SubCutaneous three times a day before meals  insulin lispro (ADMELOG) corrective regimen sliding scale   SubCutaneous at bedtime  piperacillin/tazobactam IVPB.. 4.5 Gram(s) IV Intermittent every 12 hours  sertraline 50 milliGRAM(s) Oral every 24 hours  sodium bicarbonate 1300 milliGRAM(s) Oral every 12 hours    PRN Inpatient Medications  dextrose Oral Gel 15 Gram(s) Oral once PRN  oxyCODONE    IR 7.5 milliGRAM(s) Oral every 6 hours PRN  oxyCODONE    IR 5 milliGRAM(s) Oral every 6 hours PRN        VITALS/PHYSICAL EXAM  --------------------------------------------------------------------------------  T(C): 36.6 (04-02-24 @ 21:16), Max: 36.6 (04-02-24 @ 21:16)  HR: 74 (04-02-24 @ 21:16) (74 - 76)  BP: 131/60 (04-02-24 @ 21:16) (131/60 - 143/78)  RR: 18 (04-02-24 @ 21:16) (18 - 18)  SpO2: 100% (04-02-24 @ 21:16) (100% - 100%)  Wt(kg): --        Physical Exam:  Constitutional: NAD  Eyes: PERRL, EOMI, anicteric sclera  Neck: supple; no JVD or thyromegaly  Respiratory: CTA B/L; no W/R/R, no retractions; on 2L NC  Cardiac: +S1/S2; RRR; no M/R/G  Gastrointestinal: abdomen soft, +ostomy bag draining billious stool    Extremities: Erythema around right lateral foot wound extending proximally to met base and dorsally to 5th metatarsal. S/P right toe amputation on 3/29. S/p 2-5 ray amputations on Left  Neurologic: AAOx3      LABS/STUDIES  --------------------------------------------------------------------------------              7.5    9.10  >-----------<  178      [04-03-24 @ 09:07]              24.5     130  |  104  |  57  ----------------------------<  136      [04-03-24 @ 09:07]  4.4   |  20  |  4.27        Ca     8.6     [04-03-24 @ 09:07]      Mg     1.8     [04-03-24 @ 09:07]      Phos  3.6     [04-03-24 @ 09:07]    TPro  6.3  /  Alb  3.1  /  TBili  0.2  /  DBili  x   /  AST  20  /  ALT  15  /  AlkPhos  63  [04-03-24 @ 09:07]      Creatinine Trend:  SCr 4.27 [04-03 @ 09:07]  SCr 4.32 [04-02 @ 05:30]  SCr 4.34 [04-01 @ 07:14]  SCr 4.68 [03-30 @ 05:30]  SCr 4.38 [03-29 @ 05:30]    Urinalysis - [04-03-24 @ 09:07]      Color  / Appearance  / SG  / pH       Gluc 136 / Ketone   / Bili  / Urobili        Blood  / Protein  / Leuk Est  / Nitrite       RBC  / WBC  / Hyaline  / Gran  / Sq Epi  / Non Sq Epi  / Bacteria       Iron 27, TIBC 144, %sat 19      [03-27-24 @ 14:56]  Ferritin 134      [03-27-24 @ 14:56]

## 2024-04-03 NOTE — PROGRESS NOTE ADULT - PROBLEM SELECTOR PLAN 10
Hx of colon cancer, now with colostomy.   - no active interventions

## 2024-04-03 NOTE — DISCHARGE NOTE NURSING/CASE MANAGEMENT/SOCIAL WORK - PATIENT PORTAL LINK FT
You can access the FollowMyHealth Patient Portal offered by [facility] by registering at the following website: [email or web address]. By joining [facility]’s FollowMyHealth portal, you will also be able to view your health information using other applications (apps) compatible with our system.

## 2024-04-03 NOTE — PROGRESS NOTE ADULT - PROVIDER SPECIALTY LIST ADULT
Internal Medicine
Nephrology
Podiatry
Rehab Medicine
Vascular Surgery
Infectious Disease
Internal Medicine
Nephrology
Nephrology
Podiatry
Podiatry
Nephrology
Podiatry
Nephrology
Podiatry
Podiatry
Rehab Medicine
Infectious Disease
Nephrology
Internal Medicine
Internal Medicine
Hospitalist
Internal Medicine

## 2024-04-03 NOTE — PROGRESS NOTE ADULT - PROBLEM SELECTOR PROBLEM 7
H/O aortic valve replacement

## 2024-04-03 NOTE — PROGRESS NOTE ADULT - PROBLEM SELECTOR PROBLEM 10
History of colon cancer

## 2024-04-03 NOTE — PROGRESS NOTE ADULT - PROBLEM SELECTOR PLAN 11
F: s/p 500 cc LR  E: Replete as needed, caution due to CKD   N: consistent carb  D: Plavix and Eliquis   Dispo: LUIGI

## 2024-04-03 NOTE — PROGRESS NOTE ADULT - ASSESSMENT
Patient is a 69yo M wiht PMH of DM, diabetic wounds s/p left toe amputations, COPD (on home 2L, on BIPAP overnight), CKD (unknown baseline Cr), and colon cancer (w/ colostomy bag) who presents with right toe pain/wound for the past 6-7 weeks, admitted for osteomyelitis of R foot. S/P right partial 5th ray amputation on 3/29/24. Now planned for PICC.

## 2024-04-03 NOTE — PROGRESS NOTE ADULT - PROBLEM SELECTOR PLAN 1
Patient c/o right foot pain x 6-7 weeks.  Hx of diabetic foot infection with prior left toe amputation. On doxycycline outpatient x 2 weeks without improvement. Follows with vascular at [facility] for PAD.  On podiatry exam, erythema around R lateral foot wound extending proximally to met base and dorsally to 5th metatarsal; Right lateral 5th metatarsal head with 3 x 2 cm wound with overlying eschar, neg PTB or fluctuance  Labs significant for elevated ESR/CRP, mild leukocytosis.  Podiatry consulted. Given zosyn 3.375g x 1 in the ED. Concern for prior allergy to vancomycin vs Daptomycin?   - XR R foot: No convincing plain radiographic evidence for acute osteomyelitis.  - CT R foot: No CT evidence of osteomyelitis. However, MRI would be a more sensitive test to evaluate for osteomyelitis.  - MRI of R foot (3/26): Osteomyelitis of the fifth metatarsal head and fifth digit proximal phalanx with adjacent soft tissue ulceration and phlegmon.  - BCx + gram + cocci, Staph Epi.   - Surveillance blood cultures- NTD  - S/P partial right toe amputation 3/29. Surgical margins appear to be + for Pseudomonas and Morganella   Plan:  - ID consulted, f/u recs   - f/u podiatry recs;  - Continue Zosyn 4.5 grams IV q12 x 6 weeks (3/29/24 - 5/10/24)  - Needs weekly CBC, CMP, ESR, CRP.  Fax to: [phone]  - Follow up with Dr. Archer as outpatient.

## 2024-04-03 NOTE — PROGRESS NOTE ADULT - PROBLEM SELECTOR PROBLEM 9
COPD without exacerbation

## 2024-04-03 NOTE — PROGRESS NOTE ADULT - PROBLEM SELECTOR PROBLEM 3
Metabolic acidosis
Stage 5 chronic kidney disease not on chronic dialysis
Stage 5 chronic kidney disease not on chronic dialysis
Metabolic acidosis
Metabolic acidosis
Stage 5 chronic kidney disease not on chronic dialysis
Metabolic acidosis
Metabolic acidosis

## 2024-04-03 NOTE — DISCHARGE NOTE NURSING/CASE MANAGEMENT/SOCIAL WORK - NSDCPEFALRISK_GEN_ALL_CORE
For information on Fall & Injury Prevention, visit: [email or web address] OR  [email or web address] OR  https://www.cdc.gov/steadi/patient.html

## 2024-04-03 NOTE — PROGRESS NOTE ADULT - PROBLEM SELECTOR PLAN 2
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 No s/s of active bleeding. Per old lab review, Hgb was 9 in 1/2024.   - T iron 27,     - Hgb today 6.8 (4/1).   - 1 unit prbc ordered. F/U post transfuion cbc  - Start Epo 10K units weekly.   - Holding IV Fe supplement iso active Osteomyelitis.   - maintain active T&S  - transfuse for hgb < 7
Patient with hx of CKD, baseline Cr 3.9- 4 per son.   On presentation, BUN/Cr 81/4.20 with metabolic acidosis likely iso acute renal failure, HCO3 16.   Cystatin C 4.3   S/P 500 cc LR bolus   Home med: sodium bicarb 650mg bid.    Cr. is uptrending - check U/A, urine lytes, Check PVR    - F/u Renal recs  - avoid nephrotoxic agents  - continue sodium bicarb 650 bid
Patient with hx of CKD, baseline Cr 3.9- 4 per son.   On presentation, BUN/Cr 81/4.20 with metabolic acidosis likely iso acute renal failure, HCO3 16.   Cystatin C 4.3   S/P 500 cc LR bolus   Home med: sodium bicarb 650mg bid.    - F/u Renal recs  - avoid nephrotoxic agents  - continue sodium bicarb 650 bid
Patient with hx of CKD, baseline Cr 3.9- 4 per son.   On presentation, BUN/Cr 81/4.20 with metabolic acidosis likely iso acute renal failure, HCO3 16.   Cystatin C   S/P 500 cc LR bolus   Home med: sodium bicarb 650mg bid.    - F/u Renal recs  - f/u urine lytes, cystatin C  - avoid nephrotoxic agents  - monitor urine output  - continue sodium bicarb 650 bid
Patient with hx of CKD, baseline Cr 3.9- 4 per son.   On presentation, BUN/Cr 81/4.20 with metabolic acidosis likely iso acute renal failure, HCO3 16.   Cystatin C 4.3   S/P 500 cc LR bolus. Cr. is uptrending   Home med: sodium bicarb 650mg bid.    - F/U PVR   - F/u Renal recs  - avoid nephrotoxic agents
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 No s/s of active bleeding. Per old lab review, Hgb was 9 in 1/2024.   - T iron 27,     - Hgb today 6.8 (4/1) -> Post transfusion 8.1. Currently Hgb 7.9   - S/P Epo 10K units 4/1. Cont q weekly  - Holding IV Fe supplement iso active Osteomyelitis.   - maintain active T&S  - transfuse for hgb < 7
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 No s/s of active bleeding. Per old lab review, Hgb was 9 in 1/2024.   - T iron 27,     - Hgb today 6.8 (4/1) -> Post transfusion 8.1. Currently Hgb 7.9   - S/P Epo 10K units 4/1. Cont q weekly  - Holding IV Fe supplement iso active Osteomyelitis.   - maintain active T&S  - transfuse for hgb < 7
Patient with hx of CKD, baseline Cr 3.9- 4 per son.   On presentation, BUN/Cr 81/4.20 with metabolic acidosis likely iso acute renal failure, HCO3 16.   Cystatin C   S/P 500 cc LR bolus   Home med: sodium bicarb 650mg bid.    - F/u Renal recs  - f/u urine lytes, cystatin C  - avoid nephrotoxic agents  - monitor urine output  - continue sodium bicarb 650 bid

## 2024-04-03 NOTE — PROGRESS NOTE ADULT - SUBJECTIVE AND OBJECTIVE BOX
Patient is a 68y old  Male who presents with a chief complaint of osteomyelitis of foot (03 Apr 2024 08:08)      INTERVAL HPI/ OVERNIGHT EVENTS. Pt seen and examined bedside. Dressing C/D/I.       LABS                        7.9    11.96 )-----------( 186      ( 02 Apr 2024 05:30 )             25.3     04-02    132<L>  |  106  |  59<H>  ----------------------------<  165<H>  4.4   |  15<L>  |  4.32<H>    Ca    8.5      02 Apr 2024 05:30  Phos  3.4     04-02  Mg     1.7     04-02    TPro  6.3  /  Alb  3.0<L>  /  TBili  0.3  /  DBili  x   /  AST  21  /  ALT  13  /  AlkPhos  57  04-02        ICU Vital Signs Last 24 Hrs  T(C): 36.6 (02 Apr 2024 21:16), Max: 36.6 (02 Apr 2024 21:16)  T(F): 97.9 (02 Apr 2024 21:16), Max: 97.9 (02 Apr 2024 21:16)  HR: 74 (02 Apr 2024 21:16) (74 - 76)  BP: 131/60 (02 Apr 2024 21:16) (131/60 - 143/78)  BP(mean): --  ABP: --  ABP(mean): --  RR: 18 (02 Apr 2024 21:16) (18 - 18)  SpO2: 100% (02 Apr 2024 21:16) (100% - 100%)    O2 Parameters below as of 02 Apr 2024 22:54  Patient On (Oxygen Delivery Method): BiPAP/CPAP        RADIOLOGY  < from: Xray Foot AP + Lateral + Oblique, Right (03.29.24 @ 17:34) >    IMPRESSION:  Currently status post partial 5th ray resection through mid metatarsal   shaft with sharp smooth osseous stump margin and with postsurgical   changes in the overlying soft tissues.    No proximally tracking gas collections beyond the amputation margins and   no focal areas of osteomyelitis.    Remainder of foot unchanged. Also correlate with intraoperative findings.    --- End of Report ---    < end of copied text >      MICROBIOLOGY:  Culture - Tissue with Gram Stain (03.29.24 @ 16:23)    Gram Stain:   No organisms seen  Rare WBC's   -  Aztreonam: S <=4   -  Cefepime: S <=2   -  Ciprofloxacin: S <=0.25   -  Levofloxacin: S <=0.5   -  Piperacillin/Tazobactam: S <=8   -  Tobramycin: S   Specimen Source: .Tissue Right foot proximal margin fifth metatarsal   Culture Results:   Rare Morganella morganii .... See previous culture for sensitivity.  Moderate Pseudomonas aeruginosa  Result called to and read back by_ Ms. VI Low RN  03/31/2024 12:33:28   Organism Identification: Pseudomonas aeruginosa  Pseudomonas aeruginosa   Organism: Pseudomonas aeruginosa   Organism: Pseudomonas aeruginosa   Method Type: KB   Method Type: SCOTT    PHYSICAL EXAM   Lower Extremity Focused  Vasc: PT/DP non-palpable b/l -bipasic waveforms heard at b/l DP and monophasic waveforms heard at b/l PT; no erythema or edema   Derm: Surgical site sutures intact with - no active drainage, no malodor   Neuro: protective sensation in tact  MSK: s/p 2-5 ray amputations on Left, s/p partial 5th ray amputation Right

## 2024-04-03 NOTE — CHART NOTE - NSCHARTNOTEFT_GEN_A_CORE
Chart reviewed, renal function stable with sCr 4.2, pte stable at bedside, BP at goal.   Increase HCO3 tabs to 1300mg TID.

## 2024-04-03 NOTE — PROGRESS NOTE ADULT - PROBLEM SELECTOR PROBLEM 1
Foot osteomyelitis

## 2024-04-03 NOTE — PROGRESS NOTE ADULT - ASSESSMENT
67 y/o M pmh PVD, DM, COPD presents for right foot wound and cellulitis. Confirmed on MRI Osteomyelitis of Right 5th proximal phalanx and 5th metatarsal head now S/P right partial 5th ray amputation on 3/29/24. OR clean margin bone cx growing pseudomonas and Morganella.     Plan:   - Surgical site cleansed with normal saline. Dressed with Adaptic, betadine soaked gauze, kerlix, and ACE.   - Antibiotics per ID (6 weeks Zosyn)  - Pt should be Heel WBAT to RLE  - Rest of care per primary team    Plan d/w Attending. Podiatry following.     Discharge Recommendations:   Pt should keep dressing intact until follow-up appointment     Pt should follow-up in 1 week w/ any podiatrist at the following facility:   [facility] ([facility])  [facility] Location   [address] [place] [address], [address] [address]  [phone]; [phone]  [email or web address]

## 2024-04-03 NOTE — DISCHARGE NOTE NURSING/CASE MANAGEMENT/SOCIAL WORK - NSDCFUADDAPPT_GEN_ALL_CORE_FT
Please follow up with Infectious Disease Dr. Archer on 4/17 at 10:20AM.     Address: [address] [place] [address], [address] and [facility]  Phone Number: [phone]

## 2024-04-03 NOTE — PROGRESS NOTE ADULT - PROBLEM SELECTOR PLAN 6
Pt with h/o of paroxysmal AFib per Cardiologist. Currently rate controlled.   Home meds: Eliquis 5 bid, coreg 6.25 mg bid     - c/w eliquis and coreg
Pt with h/o of paroxysmal AFib per Cardiologist. Currently rate controlled.   Home meds: Eliquis 5 bid, coreg 6.25 mg bid     - c/w eliquis  - hold coreg for now
Pt with h/o of paroxysmal AFib per Cardiologist. Currently rate controlled.   Home meds: Eliquis 5 bid, coreg 6.25 mg bid     - c/w eliquis and coreg
Pt with h/o of paroxysmal AFib per Cardiologist. Currently rate controlled.   Home meds: Eliquis 5 bid, coreg 6.25 mg bid     - c/w eliquis  - hold coreg for now

## 2024-04-03 NOTE — PROCEDURE NOTE - NSPOSTPRCRAD_GEN_A_CORE
tip visualized in ra-svc junction; 44cm/depth of insertion/line adjusted to depth of insertion/line in appropriate postion/postion of catheter/ultrasound

## 2024-04-03 NOTE — PROGRESS NOTE ADULT - ASSESSMENT
68y M w/ CKD5, hx of DM at the present time admitted for Right 5th proximal phalanx and 5th metatarsal head OM now s/p P right partial 5th ray amputation on 3/29/24. OR cultures growing pseudomonas.    1. CKD stage V - stable  Renal function currently stable and at baseline   Cystatin C levels and GFR ~ 11%  C/w strict I/O   c/w renal diet as tolerated  Recommend to adjust and dose all medications including antibiotics to GFR< 15%  c/w fluid restriction <1.2L/d   Avoid Nephrotoxins as much as possible. No NSAIDs for pain control (no ibuprofen, naproxen, diclofenac)  Avoid trauma at the level of dominant arm  Trend BMP for electrolyte monitoring    2. Anemia of chronic disease - c/b acute drop post op  Maintain hb > 7g/dl  Agree w/ 1 PRBC today  c/w AGUS SQ   Not a candidate for IV iron due to ongoing infection    3. Metabolic acidosis - increase bicarb 1300mg from BID to TID     4. Secondary hyperparathyroidism - phosp levels within acceptable ranges. No need for binders     5. R foot OM - now growing Pseudomona spp.  C/w podiatry follow ups as schedule  Adjust and dose all antibiotics to GFR< 15%    6. DM - glycemic control per primary team       Discussed w/ primary team

## 2024-04-03 NOTE — PROGRESS NOTE ADULT - PROBLEM SELECTOR PROBLEM 2
Stage 5 chronic kidney disease not on chronic dialysis
Anemia
Stage 5 chronic kidney disease not on chronic dialysis
Stage 5 chronic kidney disease not on chronic dialysis
Anemia
Stage 5 chronic kidney disease not on chronic dialysis
Anemia
Stage 5 chronic kidney disease not on chronic dialysis

## 2024-04-03 NOTE — PROGRESS NOTE ADULT - PROBLEM SELECTOR PROBLEM 8
Peripheral arterial disease

## 2024-04-03 NOTE — PROGRESS NOTE ADULT - REASON FOR ADMISSION
osteomyelitis of foot

## 2024-04-05 LAB — SURGICAL PATHOLOGY STUDY: SIGNIFICANT CHANGE UP

## 2024-04-10 DIAGNOSIS — E11.621 TYPE 2 DIABETES MELLITUS WITH FOOT ULCER: ICD-10-CM

## 2024-04-10 DIAGNOSIS — I25.2 OLD MYOCARDIAL INFARCTION: ICD-10-CM

## 2024-04-10 DIAGNOSIS — M86.171 OTHER ACUTE OSTEOMYELITIS, RIGHT ANKLE AND FOOT: ICD-10-CM

## 2024-04-10 DIAGNOSIS — N17.9 ACUTE KIDNEY FAILURE, UNSPECIFIED: ICD-10-CM

## 2024-04-10 DIAGNOSIS — E11.51 TYPE 2 DIABETES MELLITUS WITH DIABETIC PERIPHERAL ANGIOPATHY WITHOUT GANGRENE: ICD-10-CM

## 2024-04-10 DIAGNOSIS — I48.20 CHRONIC ATRIAL FIBRILLATION, UNSPECIFIED: ICD-10-CM

## 2024-04-10 DIAGNOSIS — E11.69 TYPE 2 DIABETES MELLITUS WITH OTHER SPECIFIED COMPLICATION: ICD-10-CM

## 2024-04-10 DIAGNOSIS — M86.9 OSTEOMYELITIS, UNSPECIFIED: ICD-10-CM

## 2024-04-10 DIAGNOSIS — Z88.1 ALLERGY STATUS TO OTHER ANTIBIOTIC AGENTS STATUS: ICD-10-CM

## 2024-04-10 DIAGNOSIS — Z85.038 PERSONAL HISTORY OF OTHER MALIGNANT NEOPLASM OF LARGE INTESTINE: ICD-10-CM

## 2024-04-10 DIAGNOSIS — E11.610 TYPE 2 DIABETES MELLITUS WITH DIABETIC NEUROPATHIC ARTHROPATHY: ICD-10-CM

## 2024-04-10 DIAGNOSIS — Z79.4 LONG TERM (CURRENT) USE OF INSULIN: ICD-10-CM

## 2024-04-10 DIAGNOSIS — J44.9 CHRONIC OBSTRUCTIVE PULMONARY DISEASE, UNSPECIFIED: ICD-10-CM

## 2024-04-10 DIAGNOSIS — E11.22 TYPE 2 DIABETES MELLITUS WITH DIABETIC CHRONIC KIDNEY DISEASE: ICD-10-CM

## 2024-04-10 DIAGNOSIS — I70.201 UNSPECIFIED ATHEROSCLEROSIS OF NATIVE ARTERIES OF EXTREMITIES, RIGHT LEG: ICD-10-CM

## 2024-04-10 DIAGNOSIS — Z89.422 ACQUIRED ABSENCE OF OTHER LEFT TOE(S): ICD-10-CM

## 2024-04-10 DIAGNOSIS — L97.414 NON-PRESSURE CHRONIC ULCER OF RIGHT HEEL AND MIDFOOT WITH NECROSIS OF BONE: ICD-10-CM

## 2024-04-10 DIAGNOSIS — Z93.3 COLOSTOMY STATUS: ICD-10-CM

## 2024-04-10 DIAGNOSIS — L03.115 CELLULITIS OF RIGHT LOWER LIMB: ICD-10-CM

## 2024-04-10 DIAGNOSIS — E87.21 ACUTE METABOLIC ACIDOSIS: ICD-10-CM

## 2024-04-10 DIAGNOSIS — Z79.01 LONG TERM (CURRENT) USE OF ANTICOAGULANTS: ICD-10-CM

## 2024-04-10 DIAGNOSIS — Z98.62 PERIPHERAL VASCULAR ANGIOPLASTY STATUS: ICD-10-CM

## 2024-04-10 DIAGNOSIS — B96.89 OTHER SPECIFIED BACTERIAL AGENTS AS THE CAUSE OF DISEASES CLASSIFIED ELSEWHERE: ICD-10-CM

## 2024-04-10 DIAGNOSIS — D63.1 ANEMIA IN CHRONIC KIDNEY DISEASE: ICD-10-CM

## 2024-04-10 DIAGNOSIS — Z79.02 LONG TERM (CURRENT) USE OF ANTITHROMBOTICS/ANTIPLATELETS: ICD-10-CM

## 2024-04-10 DIAGNOSIS — N18.5 CHRONIC KIDNEY DISEASE, STAGE 5: ICD-10-CM

## 2024-04-10 DIAGNOSIS — B96.5 PSEUDOMONAS (AERUGINOSA) (MALLEI) (PSEUDOMALLEI) AS THE CAUSE OF DISEASES CLASSIFIED ELSEWHERE: ICD-10-CM

## 2024-04-16 ENCOUNTER — APPOINTMENT (OUTPATIENT)
Dept: NEPHROLOGY | Facility: CLINIC | Age: 69
End: 2024-04-16
Payer: MEDICARE

## 2024-04-16 DIAGNOSIS — I10 ESSENTIAL (PRIMARY) HYPERTENSION: ICD-10-CM

## 2024-04-16 DIAGNOSIS — N18.5 CHRONIC KIDNEY DISEASE, STAGE 5: ICD-10-CM

## 2024-04-16 DIAGNOSIS — E87.20 ACIDOSIS, UNSPECIFIED: ICD-10-CM

## 2024-04-16 DIAGNOSIS — D63.1 CHRONIC KIDNEY DISEASE, STAGE 5: ICD-10-CM

## 2024-04-16 PROCEDURE — 99443: CPT | Mod: 93

## 2024-04-16 RX ORDER — ERYTHROPOIETIN 10000 [IU]/ML
10000 INJECTION, SOLUTION INTRAVENOUS; SUBCUTANEOUS
Qty: 10 | Refills: 3 | Status: ACTIVE | COMMUNITY
Start: 2024-04-16 | End: 1900-01-01

## 2024-04-17 ENCOUNTER — APPOINTMENT (OUTPATIENT)
Dept: INFECTIOUS DISEASE | Facility: CLINIC | Age: 69
End: 2024-04-17
Payer: MEDICARE

## 2024-04-17 DIAGNOSIS — M86.9 OSTEOMYELITIS, UNSPECIFIED: ICD-10-CM

## 2024-04-17 PROBLEM — E87.20 METABOLIC ACIDOSIS: Status: ACTIVE | Noted: 2024-04-17

## 2024-04-17 PROCEDURE — 99213 OFFICE O/P EST LOW 20 MIN: CPT

## 2024-04-17 NOTE — ASSESSMENT
[FreeTextEntry1] : all lab data was reviewed with patient in detail from [facility] discussed hospital data 67 yo man with CKD 5, anemia, colotomy, PAD s/p  R 5th toe amputation. needs procrit- ordered now also needs repeat labs - will arrange for home draw  TTM to discuss once data back

## 2024-04-17 NOTE — HISTORY OF PRESENT ILLNESS
[FreeTextEntry1] : 69 yo man with CKD 5, HTN, DM, anemia, PAD, COPD, s/p colectomy for colon cancer with colostomy in place,  initially seen at [facility] for his CKD where he was admitted for R 5th toe osteomyelitis, s/p  amputation 3/29/2024 (admit dates 3/26-4/3/2024) creat 4.27, CO2 20 and hgb 7.5 on 4/3. TSat 19% during the  admission. AGUS not started yet- needs to be arranged.  Denies flank pain, dysuria, hematuria or frothy urine  fatigued No SOB, CP, N, V

## 2024-04-17 NOTE — REASON FOR VISIT
[Home] : at home, [unfilled] , at the time of the visit. [Medical Office: ([facility])___] : at the medical office located in  [Verbal consent obtained from patient] : the patient, [unfilled] [Post Hospitalization] : a post hospitalization visit [FreeTextEntry1] : CKD 4-5, anemia

## 2024-04-17 NOTE — HISTORY OF PRESENT ILLNESS
[Home] : at home, [unfilled] , at the time of the visit. [Medical Office: ([facility])___] : at the medical office located in  [FreeTextEntry1] : 68 year old male with diabetes here for follow up.  Was recently admitted to [facility] for right diabetic foot infection s/p amputation.  Clean margin cultures with Pseudomonas and morganella.  He was started on IV Zosyn x 6 weeks. He reports compliance.  No side effects.  Still has pain in the foot.  No fevers, chills, drainage.  Reports foot is healing well

## 2024-04-19 ENCOUNTER — LABORATORY RESULT (OUTPATIENT)
Age: 69
End: 2024-04-19

## 2024-04-22 ENCOUNTER — LABORATORY RESULT (OUTPATIENT)
Age: 69
End: 2024-04-22

## 2024-04-25 ENCOUNTER — LABORATORY RESULT (OUTPATIENT)
Age: 69
End: 2024-04-25

## 2024-04-25 RX ORDER — SODIUM BICARBONATE 650 MG/1
650 TABLET ORAL TWICE DAILY
Qty: 180 | Refills: 3 | Status: ACTIVE | COMMUNITY
Start: 2024-04-25 | End: 1900-01-01

## 2024-04-25 RX ORDER — FERROUS GLUCONATE 324(37.5)
324 (37.5 FE) TABLET ORAL
Qty: 180 | Refills: 3 | Status: ACTIVE | COMMUNITY
Start: 2024-04-25 | End: 1900-01-01

## 2024-04-26 ENCOUNTER — LABORATORY RESULT (OUTPATIENT)
Age: 69
End: 2024-04-26

## 2024-04-29 ENCOUNTER — INPATIENT (INPATIENT)
Facility: HOSPITAL | Age: 69
LOS: 2 days | Discharge: HOME HEALTH | End: 2024-05-02
Attending: GENERAL ACUTE CARE HOSPITAL | Admitting: INTERNAL MEDICINE
Payer: MEDICARE

## 2024-04-29 ENCOUNTER — RESULT REVIEW (OUTPATIENT)
Age: 69
End: 2024-04-29

## 2024-04-29 ENCOUNTER — LABORATORY RESULT (OUTPATIENT)
Age: 69
End: 2024-04-29

## 2024-04-29 VITALS
OXYGEN SATURATION: 100 % | HEART RATE: 95 BPM | SYSTOLIC BLOOD PRESSURE: 137 MMHG | HEIGHT: 65 IN | RESPIRATION RATE: 18 BRPM | TEMPERATURE: 98 F | DIASTOLIC BLOOD PRESSURE: 72 MMHG | WEIGHT: 240.08 LBS

## 2024-04-29 DIAGNOSIS — Z89.429 ACQUIRED ABSENCE OF OTHER TOE(S), UNSPECIFIED SIDE: Chronic | ICD-10-CM

## 2024-04-29 DIAGNOSIS — D64.9 ANEMIA, UNSPECIFIED: ICD-10-CM

## 2024-04-29 DIAGNOSIS — I73.9 PERIPHERAL VASCULAR DISEASE, UNSPECIFIED: ICD-10-CM

## 2024-04-29 DIAGNOSIS — I25.10 ATHEROSCLEROTIC HEART DISEASE OF NATIVE CORONARY ARTERY WITHOUT ANGINA PECTORIS: ICD-10-CM

## 2024-04-29 DIAGNOSIS — I48.20 CHRONIC ATRIAL FIBRILLATION, UNSPECIFIED: ICD-10-CM

## 2024-04-29 DIAGNOSIS — Z95.2 PRESENCE OF PROSTHETIC HEART VALVE: ICD-10-CM

## 2024-04-29 DIAGNOSIS — Z29.9 ENCOUNTER FOR PROPHYLACTIC MEASURES, UNSPECIFIED: ICD-10-CM

## 2024-04-29 DIAGNOSIS — E11.9 TYPE 2 DIABETES MELLITUS WITHOUT COMPLICATIONS: ICD-10-CM

## 2024-04-29 DIAGNOSIS — J44.9 CHRONIC OBSTRUCTIVE PULMONARY DISEASE, UNSPECIFIED: ICD-10-CM

## 2024-04-29 DIAGNOSIS — J96.01 ACUTE RESPIRATORY FAILURE WITH HYPOXIA: ICD-10-CM

## 2024-04-29 DIAGNOSIS — M86.9 OSTEOMYELITIS, UNSPECIFIED: ICD-10-CM

## 2024-04-29 DIAGNOSIS — C18.9 MALIGNANT NEOPLASM OF COLON, UNSPECIFIED: ICD-10-CM

## 2024-04-29 DIAGNOSIS — N18.6 END STAGE RENAL DISEASE: ICD-10-CM

## 2024-04-29 DIAGNOSIS — I71.40 ABDOMINAL AORTIC ANEURYSM, WITHOUT RUPTURE, UNSPECIFIED: ICD-10-CM

## 2024-04-29 LAB
ADD ON TEST-SPECIMEN IN LAB: SIGNIFICANT CHANGE UP
ALBUMIN SERPL ELPH-MCNC: 3.4 G/DL — SIGNIFICANT CHANGE UP (ref 3.3–5)
ALBUMIN SERPL ELPH-MCNC: 4.2 G/DL — SIGNIFICANT CHANGE UP (ref 3.3–5)
ALP SERPL-CCNC: 67 U/L — SIGNIFICANT CHANGE UP (ref 40–120)
ALP SERPL-CCNC: 80 U/L — SIGNIFICANT CHANGE UP (ref 40–120)
ALT FLD-CCNC: 11 U/L — SIGNIFICANT CHANGE UP (ref 10–45)
ALT FLD-CCNC: 11 U/L — SIGNIFICANT CHANGE UP (ref 10–45)
ANION GAP SERPL CALC-SCNC: 16 MMOL/L — SIGNIFICANT CHANGE UP (ref 5–17)
ANION GAP SERPL CALC-SCNC: 18 MMOL/L — HIGH (ref 5–17)
APTT BLD: 102.7 SEC — HIGH (ref 24.5–35.6)
APTT BLD: 120 SEC — CRITICAL HIGH (ref 24.5–35.6)
APTT BLD: 42.5 SEC — HIGH (ref 24.5–35.6)
APTT BLD: >200 SEC — CRITICAL HIGH (ref 24.5–35.6)
AST SERPL-CCNC: 17 U/L — SIGNIFICANT CHANGE UP (ref 10–40)
AST SERPL-CCNC: 17 U/L — SIGNIFICANT CHANGE UP (ref 10–40)
BASE EXCESS BLDV CALC-SCNC: -17.2 MMOL/L — LOW (ref -2–3)
BASOPHILS # BLD AUTO: 0.05 K/UL — SIGNIFICANT CHANGE UP (ref 0–0.2)
BASOPHILS NFR BLD AUTO: 0.5 % — SIGNIFICANT CHANGE UP (ref 0–2)
BILIRUB SERPL-MCNC: 0.3 MG/DL — SIGNIFICANT CHANGE UP (ref 0.2–1.2)
BILIRUB SERPL-MCNC: 0.3 MG/DL — SIGNIFICANT CHANGE UP (ref 0.2–1.2)
BLD GP AB SCN SERPL QL: NEGATIVE — SIGNIFICANT CHANGE UP
BUN SERPL-MCNC: 62 MG/DL — HIGH (ref 7–23)
BUN SERPL-MCNC: 68 MG/DL — HIGH (ref 7–23)
CALCIUM SERPL-MCNC: 8.4 MG/DL — SIGNIFICANT CHANGE UP (ref 8.4–10.5)
CALCIUM SERPL-MCNC: 9.2 MG/DL — SIGNIFICANT CHANGE UP (ref 8.4–10.5)
CHLORIDE SERPL-SCNC: 100 MMOL/L — SIGNIFICANT CHANGE UP (ref 96–108)
CHLORIDE SERPL-SCNC: 101 MMOL/L — SIGNIFICANT CHANGE UP (ref 96–108)
CK MB CFR SERPL CALC: 4.1 NG/ML — SIGNIFICANT CHANGE UP (ref 0–6.7)
CK SERPL-CCNC: 61 U/L — SIGNIFICANT CHANGE UP (ref 30–200)
CO2 BLDV-SCNC: 12.1 MMOL/L — LOW (ref 22–26)
CO2 SERPL-SCNC: 13 MMOL/L — LOW (ref 22–31)
CO2 SERPL-SCNC: 9 MMOL/L — CRITICAL LOW (ref 22–31)
CREAT ?TM UR-MCNC: 165 MG/DL — SIGNIFICANT CHANGE UP
CREAT SERPL-MCNC: 7.84 MG/DL — HIGH (ref 0.5–1.3)
CREAT SERPL-MCNC: 7.9 MG/DL — HIGH (ref 0.5–1.3)
CRP SERPL-MCNC: 107.2 MG/L — HIGH (ref 0–4)
EGFR: 7 ML/MIN/1.73M2 — LOW
EGFR: 7 ML/MIN/1.73M2 — LOW
EOSINOPHIL # BLD AUTO: 0.09 K/UL — SIGNIFICANT CHANGE UP (ref 0–0.5)
EOSINOPHIL NFR BLD AUTO: 0.9 % — SIGNIFICANT CHANGE UP (ref 0–6)
ERYTHROCYTE [SEDIMENTATION RATE] IN BLOOD: 102 MM/HR — HIGH
FERRITIN SERPL-MCNC: 131 NG/ML — SIGNIFICANT CHANGE UP (ref 30–400)
FLUAV AG NPH QL: SIGNIFICANT CHANGE UP
FLUBV AG NPH QL: SIGNIFICANT CHANGE UP
GAS PNL BLDV: SIGNIFICANT CHANGE UP
GLUCOSE SERPL-MCNC: 105 MG/DL — HIGH (ref 70–99)
GLUCOSE SERPL-MCNC: 113 MG/DL — HIGH (ref 70–99)
HAV IGM SER-ACNC: SIGNIFICANT CHANGE UP
HBV CORE IGM SER-ACNC: SIGNIFICANT CHANGE UP
HBV SURFACE AB SER-ACNC: SIGNIFICANT CHANGE UP
HBV SURFACE AG SER-ACNC: SIGNIFICANT CHANGE UP
HCO3 BLDV-SCNC: 11 MMOL/L — LOW (ref 22–29)
HCT VFR BLD CALC: 24.6 % — LOW (ref 39–50)
HCT VFR BLD CALC: 29.6 % — LOW (ref 39–50)
HCV AB S/CO SERPL IA: 0.05 S/CO — SIGNIFICANT CHANGE UP
HCV AB SERPL-IMP: SIGNIFICANT CHANGE UP
HGB BLD-MCNC: 7.5 G/DL — LOW (ref 13–17)
HGB BLD-MCNC: 9 G/DL — LOW (ref 13–17)
IMM GRANULOCYTES NFR BLD AUTO: 0.3 % — SIGNIFICANT CHANGE UP (ref 0–0.9)
INR BLD: 1.15 — SIGNIFICANT CHANGE UP (ref 0.85–1.18)
INR BLD: 1.19 — HIGH (ref 0.85–1.18)
INR BLD: 1.23 — HIGH (ref 0.85–1.18)
IRON SATN MFR SERPL: 23 UG/DL — LOW (ref 45–165)
IRON SATN MFR SERPL: 9 % — LOW (ref 16–55)
LYMPHOCYTES # BLD AUTO: 1.23 K/UL — SIGNIFICANT CHANGE UP (ref 1–3.3)
LYMPHOCYTES # BLD AUTO: 12.3 % — LOW (ref 13–44)
MAGNESIUM SERPL-MCNC: 1.8 MG/DL — SIGNIFICANT CHANGE UP (ref 1.6–2.6)
MAGNESIUM SERPL-MCNC: 2 MG/DL — SIGNIFICANT CHANGE UP (ref 1.6–2.6)
MCHC RBC-ENTMCNC: 27.8 PG — SIGNIFICANT CHANGE UP (ref 27–34)
MCHC RBC-ENTMCNC: 27.8 PG — SIGNIFICANT CHANGE UP (ref 27–34)
MCHC RBC-ENTMCNC: 30.4 GM/DL — LOW (ref 32–36)
MCHC RBC-ENTMCNC: 30.5 GM/DL — LOW (ref 32–36)
MCV RBC AUTO: 91.1 FL — SIGNIFICANT CHANGE UP (ref 80–100)
MCV RBC AUTO: 91.4 FL — SIGNIFICANT CHANGE UP (ref 80–100)
MONOCYTES # BLD AUTO: 1.43 K/UL — HIGH (ref 0–0.9)
MONOCYTES NFR BLD AUTO: 14.2 % — HIGH (ref 2–14)
NEUTROPHILS # BLD AUTO: 7.21 K/UL — SIGNIFICANT CHANGE UP (ref 1.8–7.4)
NEUTROPHILS NFR BLD AUTO: 71.8 % — SIGNIFICANT CHANGE UP (ref 43–77)
NRBC # BLD: 0 /100 WBCS — SIGNIFICANT CHANGE UP (ref 0–0)
NRBC # BLD: 0 /100 WBCS — SIGNIFICANT CHANGE UP (ref 0–0)
NT-PROBNP SERPL-SCNC: HIGH PG/ML (ref 0–300)
OSMOLALITY UR: 288 MOSM/KG — LOW (ref 300–900)
PCO2 BLDV: 34 MMHG — LOW (ref 42–55)
PH BLDV: 7.12 — CRITICAL LOW (ref 7.32–7.43)
PHOSPHATE SERPL-MCNC: 7.3 MG/DL — HIGH (ref 2.5–4.5)
PHOSPHATE SERPL-MCNC: 7.9 MG/DL — HIGH (ref 2.5–4.5)
PLATELET # BLD AUTO: 252 K/UL — SIGNIFICANT CHANGE UP (ref 150–400)
PLATELET # BLD AUTO: 288 K/UL — SIGNIFICANT CHANGE UP (ref 150–400)
PO2 BLDV: 48 MMHG — HIGH (ref 25–45)
POTASSIUM SERPL-MCNC: 4.4 MMOL/L — SIGNIFICANT CHANGE UP (ref 3.5–5.3)
POTASSIUM SERPL-MCNC: 4.5 MMOL/L — SIGNIFICANT CHANGE UP (ref 3.5–5.3)
POTASSIUM SERPL-SCNC: 4.4 MMOL/L — SIGNIFICANT CHANGE UP (ref 3.5–5.3)
POTASSIUM SERPL-SCNC: 4.5 MMOL/L — SIGNIFICANT CHANGE UP (ref 3.5–5.3)
POTASSIUM UR-SCNC: 31 MMOL/L — SIGNIFICANT CHANGE UP
PROT ?TM UR-MCNC: 33 MG/DL — HIGH (ref 0–12)
PROT SERPL-MCNC: 6.5 G/DL — SIGNIFICANT CHANGE UP (ref 6–8.3)
PROT SERPL-MCNC: 8.2 G/DL — SIGNIFICANT CHANGE UP (ref 6–8.3)
PROT/CREAT UR-RTO: 0.2 RATIO — SIGNIFICANT CHANGE UP (ref 0–0.2)
PROTHROM AB SERPL-ACNC: 13.1 SEC — HIGH (ref 9.5–13)
PROTHROM AB SERPL-ACNC: 13.5 SEC — HIGH (ref 9.5–13)
PROTHROM AB SERPL-ACNC: 13.9 SEC — HIGH (ref 9.5–13)
RBC # BLD: 2.7 M/UL — LOW (ref 4.2–5.8)
RBC # BLD: 3.24 M/UL — LOW (ref 4.2–5.8)
RBC # FLD: 17 % — HIGH (ref 10.3–14.5)
RBC # FLD: 17.2 % — HIGH (ref 10.3–14.5)
RH IG SCN BLD-IMP: POSITIVE — SIGNIFICANT CHANGE UP
RSV RNA NPH QL NAA+NON-PROBE: SIGNIFICANT CHANGE UP
SAO2 % BLDV: 81.8 % — SIGNIFICANT CHANGE UP (ref 67–88)
SARS-COV-2 RNA SPEC QL NAA+PROBE: SIGNIFICANT CHANGE UP
SODIUM SERPL-SCNC: 128 MMOL/L — LOW (ref 135–145)
SODIUM SERPL-SCNC: 129 MMOL/L — LOW (ref 135–145)
SODIUM UR-SCNC: 20 MMOL/L — SIGNIFICANT CHANGE UP
TIBC SERPL-MCNC: 245 UG/DL — SIGNIFICANT CHANGE UP (ref 220–430)
TRANSFERRIN SERPL-MCNC: 202 MG/DL — SIGNIFICANT CHANGE UP (ref 200–360)
TROPONIN T, HIGH SENSITIVITY RESULT: 209 NG/L — CRITICAL HIGH (ref 0–51)
TROPONIN T, HIGH SENSITIVITY RESULT: 268 NG/L — CRITICAL HIGH (ref 0–51)
UIBC SERPL-MCNC: 222 UG/DL — SIGNIFICANT CHANGE UP (ref 110–370)
UUN UR-MCNC: 335 MG/DL — SIGNIFICANT CHANGE UP
WBC # BLD: 10.04 K/UL — SIGNIFICANT CHANGE UP (ref 3.8–10.5)
WBC # BLD: 11.29 K/UL — HIGH (ref 3.8–10.5)
WBC # FLD AUTO: 10.04 K/UL — SIGNIFICANT CHANGE UP (ref 3.8–10.5)
WBC # FLD AUTO: 11.29 K/UL — HIGH (ref 3.8–10.5)

## 2024-04-29 PROCEDURE — 71045 X-RAY EXAM CHEST 1 VIEW: CPT | Mod: 26,59

## 2024-04-29 PROCEDURE — 76937 US GUIDE VASCULAR ACCESS: CPT | Mod: 26

## 2024-04-29 PROCEDURE — 99291 CRITICAL CARE FIRST HOUR: CPT

## 2024-04-29 PROCEDURE — 36415 COLL VENOUS BLD VENIPUNCTURE: CPT

## 2024-04-29 PROCEDURE — 36556 INSERT NON-TUNNEL CV CATH: CPT

## 2024-04-29 PROCEDURE — 99221 1ST HOSP IP/OBS SF/LOW 40: CPT

## 2024-04-29 PROCEDURE — 93306 TTE W/DOPPLER COMPLETE: CPT | Mod: 26

## 2024-04-29 PROCEDURE — 73630 X-RAY EXAM OF FOOT: CPT | Mod: 26,RT

## 2024-04-29 PROCEDURE — 99222 1ST HOSP IP/OBS MODERATE 55: CPT | Mod: GC

## 2024-04-29 PROCEDURE — 99223 1ST HOSP IP/OBS HIGH 75: CPT

## 2024-04-29 PROCEDURE — 93010 ELECTROCARDIOGRAM REPORT: CPT

## 2024-04-29 PROCEDURE — 36000 PLACE NEEDLE IN VEIN: CPT

## 2024-04-29 RX ORDER — HEPARIN SODIUM 5000 [USP'U]/ML
1800 INJECTION INTRAVENOUS; SUBCUTANEOUS
Qty: 25000 | Refills: 0 | Status: DISCONTINUED | OUTPATIENT
Start: 2024-04-29 | End: 2024-04-29

## 2024-04-29 RX ORDER — ATORVASTATIN CALCIUM 80 MG/1
40 TABLET, FILM COATED ORAL AT BEDTIME
Refills: 0 | Status: DISCONTINUED | OUTPATIENT
Start: 2024-04-29 | End: 2024-05-02

## 2024-04-29 RX ORDER — FUROSEMIDE 40 MG
40 TABLET ORAL ONCE
Refills: 0 | Status: COMPLETED | OUTPATIENT
Start: 2024-04-29 | End: 2024-04-29

## 2024-04-29 RX ORDER — INFLUENZA VIRUS VACCINE 15; 15; 15; 15 UG/.5ML; UG/.5ML; UG/.5ML; UG/.5ML
0.7 SUSPENSION INTRAMUSCULAR ONCE
Refills: 0 | Status: DISCONTINUED | OUTPATIENT
Start: 2024-04-29 | End: 2024-05-02

## 2024-04-29 RX ORDER — FUROSEMIDE 40 MG
60 TABLET ORAL ONCE
Refills: 0 | Status: COMPLETED | OUTPATIENT
Start: 2024-04-29 | End: 2024-04-29

## 2024-04-29 RX ORDER — CLOPIDOGREL BISULFATE 75 MG/1
75 TABLET, FILM COATED ORAL DAILY
Refills: 0 | Status: DISCONTINUED | OUTPATIENT
Start: 2024-04-30 | End: 2024-05-02

## 2024-04-29 RX ORDER — HEPARIN SODIUM 5000 [USP'U]/ML
INJECTION INTRAVENOUS; SUBCUTANEOUS
Qty: 25000 | Refills: 0 | Status: DISCONTINUED | OUTPATIENT
Start: 2024-04-29 | End: 2024-04-29

## 2024-04-29 RX ORDER — CARVEDILOL PHOSPHATE 80 MG/1
6.25 CAPSULE, EXTENDED RELEASE ORAL EVERY 12 HOURS
Refills: 0 | Status: DISCONTINUED | OUTPATIENT
Start: 2024-04-29 | End: 2024-04-30

## 2024-04-29 RX ORDER — PIPERACILLIN AND TAZOBACTAM 4; .5 G/20ML; G/20ML
4.5 INJECTION, POWDER, LYOPHILIZED, FOR SOLUTION INTRAVENOUS EVERY 12 HOURS
Refills: 0 | Status: DISCONTINUED | OUTPATIENT
Start: 2024-04-29 | End: 2024-05-02

## 2024-04-29 RX ORDER — APIXABAN 2.5 MG/1
2.5 TABLET, FILM COATED ORAL EVERY 12 HOURS
Refills: 0 | Status: DISCONTINUED | OUTPATIENT
Start: 2024-04-29 | End: 2024-04-30

## 2024-04-29 RX ORDER — FUROSEMIDE 40 MG
20 TABLET ORAL ONCE
Refills: 0 | Status: DISCONTINUED | OUTPATIENT
Start: 2024-04-29 | End: 2024-04-29

## 2024-04-29 RX ADMIN — PIPERACILLIN AND TAZOBACTAM 25 GRAM(S): 4; .5 INJECTION, POWDER, LYOPHILIZED, FOR SOLUTION INTRAVENOUS at 09:55

## 2024-04-29 RX ADMIN — Medication 40 MILLIGRAM(S): at 01:44

## 2024-04-29 RX ADMIN — Medication 60 MILLIGRAM(S): at 02:55

## 2024-04-29 RX ADMIN — HEPARIN SODIUM 15 UNIT(S)/HR: 5000 INJECTION INTRAVENOUS; SUBCUTANEOUS at 12:58

## 2024-04-29 RX ADMIN — HEPARIN SODIUM 1900 UNIT(S)/HR: 5000 INJECTION INTRAVENOUS; SUBCUTANEOUS at 05:59

## 2024-04-29 RX ADMIN — PIPERACILLIN AND TAZOBACTAM 25 GRAM(S): 4; .5 INJECTION, POWDER, LYOPHILIZED, FOR SOLUTION INTRAVENOUS at 22:10

## 2024-04-29 RX ADMIN — HEPARIN SODIUM 18 UNIT(S)/HR: 5000 INJECTION INTRAVENOUS; SUBCUTANEOUS at 11:57

## 2024-04-29 NOTE — CONSULT NOTE ADULT - ASSESSMENT
67 y/o M pmh Chronic AFib, DM, COPD, CKD, PAD, and colon cancer admitted for hemodialysis. Pt s/p partial 5th ray amputation on 3/29/24 for osteomyelitis with residual OM. Pt recieving 6 weeks IV zosyn (through 5/10). Surgical site wound with dehiscence, no signs of acute infection.      Plan:   - Sutures removed using #11 blade and hemostat  - Excisional debridement down to and including level of dermis with #11 blade  - C/w abx for residual OM previously recommended by ID (6 weeks zosyn through 5/10)  - Follow-up right foot X-rays  - Surgical site cleansed with normal saline. Dressed with betadine soaked gauze, kerlix, and ACE.   - Pt should be Heel WBAT to RLE  - Rest of care per primary team    Plan d/w Attending. Podiatry following.     Discharge Recommendations:   Pt should keep dressing intact until follow-up appointment     Pt should follow-up in 1 week w/ any podiatrist at the following facility:   [facility] ([facility])  [facility] Location   [address] [place] [address], [address]  [place] [address]  [phone]; [phone]  [email or web address]

## 2024-04-29 NOTE — CONSULT NOTE ADULT - SUBJECTIVE AND OBJECTIVE BOX
Critical Care Consult   Consult reason: urgent HD    HPI: 69yo M with PMH of Chronic afib on elliquis/apixaban, DM, diabetic wounds s/p left toe amputations (currently on zosyn for pseudomonas osteo until 5/10), COPD (on home 2L, on BIPAP overnight), Stage 5 CKD (baseline Cr 3.9), ?CAD, PAD, and colon cancer (w/ colostomy bag)      VITALS  Vital Signs Last 24 Hrs  T(C): 36.7 (29 Apr 2024 02:18), Max: 36.7 (29 Apr 2024 02:18)  T(F): 98 (29 Apr 2024 02:18), Max: 98 (29 Apr 2024 02:18)  HR: 87 (29 Apr 2024 02:18) (87 - 95)  BP: 116/70 (29 Apr 2024 02:18) (116/70 - 137/72)  BP(mean): --  RR: 20 (29 Apr 2024 02:18) (18 - 20)  SpO2: 100% (29 Apr 2024 02:18) (100% - 100%)    Parameters below as of 29 Apr 2024 00:11  Patient On (Oxygen Delivery Method): mask, nonrebreather  O2 Flow (L/min): 15      CAPILLARY BLOOD GLUCOSE          PHYSICAL EXAM  General: NAD, sitting comfortably in bed   HEENT: PERRL/ EOMI, no scleral icterus, MMM  Neck: Supple, no JVD  Respiratory: lungs CTA b/l, no wheezes/crackles, no accessory muscle use  Cardiovascular: Regular rhythm/rate; +S1 +S2, no murmurs  Gastrointestinal: Soft, NTND, normoactive BS, no rebound, no guarding  Genitourinary: no suprapubic tenderness  Extremities: WWP, no cyanosis, no clubbing, no edema, pulses equal  Neurological: A&Ox3, no gross focal deficits, follows commands  Skin: Normal temperature, warm, dry    MEDICATIONS  (STANDING):  furosemide   Injectable 60 milliGRAM(s) IV Push Once    MEDICATIONS  (PRN):      vancomycin (Unknown)      LABS                        7.5    10.04 )-----------( 252      ( 29 Apr 2024 00:29 )             24.6     04-29    128<L>  |  101  |  62<H>  ----------------------------<  113<H>  4.4   |  9<LL>  |  7.84<H>    Ca    8.4      29 Apr 2024 00:29  Phos  7.3     04-29  Mg     1.8     04-29    TPro  6.5  /  Alb  3.4  /  TBili  0.3  /  DBili  x   /  AST  17  /  ALT  11  /  AlkPhos  67  04-29      Urinalysis Basic - ( 29 Apr 2024 00:29 )    Color: x / Appearance: x / SG: x / pH: x  Gluc: 113 mg/dL / Ketone: x  / Bili: x / Urobili: x   Blood: x / Protein: x / Nitrite: x   Leuk Esterase: x / RBC: x / WBC x   Sq Epi: x / Non Sq Epi: x / Bacteria: x            RADIOLOGY & ADDITIONAL TESTS: Reviewed Critical Care Consult   Consult reason: urgent HD    HPI: 67yo M with PMH of Chronic afib (on eliquis), DM, diabetic wounds s/p left toe amputations (currently on zosyn for pseudomonas osteo until 5/10), COPD (on home 2L, on BIPAP overnight), Stage 5 CKD (baseline Cr 3.9), ?CAD, PAD, and colon cancer (w/ colostomy bag) sent in by oupatient nephrologist for SOB.       VITALS  Vital Signs Last 24 Hrs  T(C): 36.7 (29 Apr 2024 02:18), Max: 36.7 (29 Apr 2024 02:18)  T(F): 98 (29 Apr 2024 02:18), Max: 98 (29 Apr 2024 02:18)  HR: 87 (29 Apr 2024 02:18) (87 - 95)  BP: 116/70 (29 Apr 2024 02:18) (116/70 - 137/72)  BP(mean): --  RR: 20 (29 Apr 2024 02:18) (18 - 20)  SpO2: 100% (29 Apr 2024 02:18) (100% - 100%)    Parameters below as of 29 Apr 2024 00:11  Patient On (Oxygen Delivery Method): mask, nonrebreather  O2 Flow (L/min): 15      CAPILLARY BLOOD GLUCOSE          PHYSICAL EXAM  General: NAD, sitting comfortably in bed on NL  Respiratory: crackles at b/l bases  Extremities: WWP  Neurological: A&Ox3, no gross focal deficits, follows commands  Skin: Normal temperature, warm, dry    MEDICATIONS  (STANDING):  furosemide   Injectable 60 milliGRAM(s) IV Push Once    MEDICATIONS  (PRN):      vancomycin (Unknown)      LABS                        7.5    10.04 )-----------( 252      ( 29 Apr 2024 00:29 )             24.6     04-29    128<L>  |  101  |  62<H>  ----------------------------<  113<H>  4.4   |  9<LL>  |  7.84<H>    Ca    8.4      29 Apr 2024 00:29  Phos  7.3     04-29  Mg     1.8     04-29    TPro  6.5  /  Alb  3.4  /  TBili  0.3  /  DBili  x   /  AST  17  /  ALT  11  /  AlkPhos  67  04-29      Urinalysis Basic - ( 29 Apr 2024 00:29 )    Color: x / Appearance: x / SG: x / pH: x  Gluc: 113 mg/dL / Ketone: x  / Bili: x / Urobili: x   Blood: x / Protein: x / Nitrite: x   Leuk Esterase: x / RBC: x / WBC x   Sq Epi: x / Non Sq Epi: x / Bacteria: x            RADIOLOGY & ADDITIONAL TESTS: Reviewed

## 2024-04-29 NOTE — PROGRESS NOTE ADULT - PROBLEM SELECTOR PLAN 2
h/o COPD/ZAYDA (on 2-4 L NC at home) and BiPAP at home   - no wheezing on exam but with cough  - does not appear to be in exacerbation   - CPAP at night

## 2024-04-29 NOTE — ED PROVIDER NOTE - NS ED ATTENDING STATEMENT MOD
eyeglasses I have personally provided the amount of critical care time documented below excluding time spent on separate procedures.

## 2024-04-29 NOTE — PROGRESS NOTE ADULT - PROBLEM SELECTOR PLAN 12
h/o Colon CA s/p ostomy placement, stable   - continue to monitor ostomy output      Electrolytes: cautious iso ESRD  Nutrition: NPO with transition to renal diet s/p procedure  DVT: heparin   Code status: Full   Disposition: telemetry

## 2024-04-29 NOTE — PATIENT PROFILE ADULT - FALL HARM RISK - HARM RISK INTERVENTIONS
Assistance with ambulation/Assistance OOB with selected safe patient handling equipment/Communicate Risk of Fall with Harm to all staff/Discuss with provider need for PT consult/Monitor gait and stability/Reinforce activity limits and safety measures with patient and family/Tailored Fall Risk Interventions/Visual Cue: Yellow wristband and red socks/Bed in lowest position, wheels locked, appropriate side rails in place/Call bell, personal items and telephone in reach/Instruct patient to call for assistance before getting out of bed or chair/Non-slip footwear when patient is out of bed/[place] to call system/Physically safe environment - no spills, clutter or unnecessary equipment/Purposeful Proactive Rounding/Room/bathroom lighting operational, light cord in reach

## 2024-04-29 NOTE — PROGRESS NOTE ADULT - PROBLEM SELECTOR PLAN 8
Hgb on admission 11.2, MCV 91.4. Currently no signs of active bleeding (no hematochezia, melena, hemoptysis, hematuria), likely 2/2 AoCD and JESSICA  - trend CBC  - maintain active T&S  - transfuse if Hgb <7   - holding iron supplementation with treated for OM

## 2024-04-29 NOTE — PROGRESS NOTE ADULT - PROBLEM SELECTOR PLAN 10
H/o of obstructive CAD - possibly offere CABG prior to TAVR which was ultimately deferred. Presenting with troponemia likely iso demand ischemia with CAD and acute on chronic renal failure  [facility] 12/2021: 80-90% LPL, 70-80% OM1 and D1, 60-70% distal RCA  EKG 4/29/24: NSR with LBBB, LVH, ST-T changes due to strain  - seen by cardiology for risk stratification prior to TDC placement  - EKG stable, patient does not c/o of active chest pain

## 2024-04-29 NOTE — PROGRESS NOTE ADULT - SUBJECTIVE AND OBJECTIVE BOX
OVERNIGHT EVENTS: admitted for worsening dyspnea and acute renal failure    SUBJECTIVE / INTERVAL HPI: Patient seen and examined at bedside. He is c/o worsening, SOB, cough and neck/anterior chest discomfort.    VITAL SIGNS:  Vital Signs Last 24 Hrs  T(C): 36.6 (29 Apr 2024 10:56), Max: 36.8 (29 Apr 2024 06:48)  T(F): 97.9 (29 Apr 2024 10:56), Max: 98.3 (29 Apr 2024 06:48)  HR: 80 (29 Apr 2024 13:05) (80 - 95)  BP: 118/46 (29 Apr 2024 12:30) (104/66 - 145/92)  BP(mean): 66 (29 Apr 2024 12:30) (66 - 108)  RR: 18 (29 Apr 2024 13:05) (18 - 20)  SpO2: 98% (29 Apr 2024 13:05) (97% - 100%)    Parameters below as of 29 Apr 2024 13:05  Patient On (Oxygen Delivery Method): BiPAP/CPAP        PHYSICAL EXAM:    General: alert, in no acute distress, +BiPAP  HEENT: NC/AT; PERRL, anicteric sclera; poor dentition   Neck: supple, wide circumference   Cardiovascular: +S1/S2, RRR  Respiratory: bibasilar crackles  Gastrointestinal: obese, soft, NT/ND; +BSx4, +ostomy   Extremities: WWP; trace LE edema, R foot wrapped and with dresisng  Neurological: AAOx3; no focal deficits    MEDICATIONS:  MEDICATIONS  (STANDING):  heparin  Infusion 1800 Unit(s)/Hr (15 mL/Hr) IV Continuous <Continuous>  influenza  Vaccine (HIGH DOSE) 0.7 milliLiter(s) IntraMuscular once  piperacillin/tazobactam IVPB.. 4.5 Gram(s) IV Intermittent every 12 hours    MEDICATIONS  (PRN):      ALLERGIES:  Allergies    vancomycin (Unknown)    Intolerances        LABS:                        9.0    11.29 )-----------( 288      ( 29 Apr 2024 08:50 )             29.6     04-29    129<L>  |  100  |  68<H>  ----------------------------<  105<H>  4.5   |  13<L>  |  7.90<H>    Ca    9.2      29 Apr 2024 08:50  Phos  7.9     04-29  Mg     2.0     04-29    TPro  8.2  /  Alb  4.2  /  TBili  0.3  /  DBili  x   /  AST  17  /  ALT  11  /  AlkPhos  80  04-29    PT/INR - ( 29 Apr 2024 12:00 )   PT: 13.9 sec;   INR: 1.23          PTT - ( 29 Apr 2024 12:00 )  PTT:120.0 sec  Urinalysis Basic - ( 29 Apr 2024 08:50 )    Color: x / Appearance: x / SG: x / pH: x  Gluc: 105 mg/dL / Ketone: x  / Bili: x / Urobili: x   Blood: x / Protein: x / Nitrite: x   Leuk Esterase: x / RBC: x / WBC x   Sq Epi: x / Non Sq Epi: x / Bacteria: x      CAPILLARY BLOOD GLUCOSE          RADIOLOGY & ADDITIONAL TESTS: Reviewed.

## 2024-04-29 NOTE — PROGRESS NOTE ADULT - PROBLEM SELECTOR PLAN 6
h/o DM c/b OM. Last A1c 5.0 (03/2024), Dx at age 34 y/o, on insulin at home which he self titrates  - q6hr FS while NPO,  - ISS  - carb consistent diet post intervention

## 2024-04-29 NOTE — CONSULT NOTE ADULT - SUBJECTIVE AND OBJECTIVE BOX
Attending: Dr. Lindsey     Patient is a 68y old  Male who presents with a chief complaint of     HPI:  67yo M with PMH of Chronic afib on eliquis/apixaban, DM, diabetic wounds s/p left toe amputations, COPD (on home 2L, on BIPAP overnight), Stage 5 CKD (baseline Cr 3.9), ?CAD, PAD, and colon cancer (w/ colostomy bag), presenting with worsening SOB as advised by nephrologist Dr Montesinos.. He states that she has told him he will need to start HD soon, and that he is to present to ED w worsening le edema or sob, as he may require more urgent initiation of HD. Denies associated chest pain or tightness, as well as worsening swelling of his lower extremities. Denies fever, chills, headache, abd pain, nausea, vomiting, or diarrhea.   Podiatry addendum: 69 y/o M pmh Chronic AFib, DM, COPD, CKD, PAD, and colon cancer admitted for hemodialysis. Pt previously admitted 3/26-4/3 for right foot wound for which he underwent partial 5th ray amputation on 3/29 for osteomyelitis. Patient with residual OM and was discharged home with 6 weeks zosyn. Patient still has his PICC and has been receiving antibiotics. Patient states he has not followed up with Dr. Lindsey as he was unable to make his appointment as he was experiencing foot pain. Did not re-schedule appointment. States he has had nursing services come after 3 days and has been changing bandage. Denies any drainage or redness or swelling.       Review of systems negative except per HPI and as stated below  General:	 no weakness; no fevers, no chills  Skin/Breast: no rash  Respiratory and Thorax: no SOB, no cough  Cardiovascular:	No chest pain  Gastrointestinal:	 no nausea, vomiting , diarrhea  Genitourinary:	no dysuria, no difficulty urinating, no hematuria  Musculoskeletal:	no weakness, no joint swelling/pain  Neurological:	no focal weakness/numbness  Endocrine:	no polyuria, no polydipsia    PAST MEDICAL & SURGICAL HISTORY:  COPD with hypoxia  Chronic kidney disease (CKD)  CAD (coronary artery disease)  Type 2 diabetes mellitus  Diabetic foot ulcers  PAD (peripheral artery disease)  Status post amputation of toe        Home Medications:  carvedilol 6.25 mg oral tablet: 1 tab(s) orally every 12 hours (03 Apr 2024 13:31)  Cozaar 25 mg oral tablet: 1 tab(s) orally once a day (26 Mar 2024 10:48)  Crestor 20 mg oral tablet: 1 tab(s) orally once a day (at bedtime) (26 Mar 2024 10:48)  Eliquis 5 mg oral tablet: 1 tab(s) orally once a day (26 Mar 2024 10:48)  epoetin ori: 10,000 unit(s) once a week weekly (03 Apr 2024 13:31)  Lasix 40 mg oral tablet: 1 tab(s) orally once a day (26 Mar 2024 10:48)  oxyCODONE 5 mg oral tablet: 1 tab(s) orally every 6 hours As needed Moderate Pain (4 - 6) (03 Apr 2024 13:30)  oxyCODONE 7.5 mg oral tablet: 1 tab(s) orally every 6 hours As needed Severe Pain (7 - 10) (03 Apr 2024 13:30)  piperacillin-tazobactam: 4.5 gram(s) every 12 hours 4.5 grams IV q12 x 6 weeks (3/29/24 - 5/10/24) (03 Apr 2024 13:30)  Plavix 75 mg oral tablet: 1 tab(s) orally once a day (26 Mar 2024 10:48)  Toprol-XL 25 mg oral tablet, extended release: 1 tab(s) orally once a day (26 Mar 2024 10:48)  Zoloft 50 mg oral tablet: 1 tab(s) orally once a day (26 Mar 2024 10:48)    Allergies    vancomycin (Unknown)    Intolerances      FAMILY HISTORY:  FH: diabetes mellitus      Social History:       LABS                        7.5    10.04 )-----------( 252      ( 29 Apr 2024 00:29 )             24.6     04-29    128<L>  |  101  |  62<H>  ----------------------------<  113<H>  4.4   |  9<LL>  |  7.84<H>    Ca    8.4      29 Apr 2024 00:29  Phos  7.3     04-29  Mg     1.8     04-29    TPro  6.5  /  Alb  3.4  /  TBili  0.3  /  DBili  x   /  AST  17  /  ALT  11  /  AlkPhos  67  04-29        Vital Signs Last 24 Hrs  T(C): 36.8 (29 Apr 2024 06:48), Max: 36.8 (29 Apr 2024 06:48)  T(F): 98.3 (29 Apr 2024 06:48), Max: 98.3 (29 Apr 2024 06:48)  HR: 90 (29 Apr 2024 08:35) (87 - 95)  BP: 119/62 (29 Apr 2024 08:35) (104/66 - 145/92)  BP(mean): 85 (29 Apr 2024 08:35) (85 - 108)  RR: 18 (29 Apr 2024 08:35) (18 - 20)  SpO2: 98% (29 Apr 2024 08:35) (98% - 100%)    Parameters below as of 29 Apr 2024 08:35  Patient On (Oxygen Delivery Method): BiPAP/CPAP        PHYSICAL EXAM  General: NAD, AA0x3    Right Lower Extremity Focused:  Vasc: PT/DP non-palpable - biphasic DP, monophasic PT, no edema or erythema  Derm: Surgical site sutures intact with - no active drainage, no malodor   Neuro: protective sensation in tact  MSK: s/p 2-5 ray amputations on Left, s/p partial 5th ray amputation Right    RADIOLOGY  X-rays pending

## 2024-04-29 NOTE — ED PROVIDER NOTE - PROGRESS NOTE DETAILS
patient los hemodynamically stable comfortable on nasal cannula intermittently using his home BiPAP consult telemetry patient with gross fluid overload chest x-ray showing fluid overload proBNP greater than 7000 troponin elevated likely secondary to demand/decreased clearance, hemoglobin at baseline, discussed case with nephrology we will plan for urgent dialysis in the a.m., patient will need access for dialysis, ICU telemetry consulted, given additional 60 mg IV Lasix as per nephrology attending, Admitted to telemetry patient los hemodynamically stable comfortable on nasal cannula intermittently using his home BiPAP consult telemetry patient with gross fluid overload chest x-ray showing fluid overload proBNP greater than 7000 troponin elevated likely secondary to demand/decreased clearance, hemoglobin at baseline, Acidosis secondary to uremia, discussed case with nephrology we will plan for urgent dialysis in the a.m., patient will need access for dialysis, ICU telemetry consulted, given additional 60 mg IV Lasix as per nephrology attending, seen by medical ICU/telemetry, discussed case with nephrology, plan for hemodialysis, patient has no access, Admitted to telemetry

## 2024-04-29 NOTE — PROGRESS NOTE ADULT - PROBLEM SELECTOR PLAN 3
known h/o CKD Stage 5, followed by Dr. Montesinos. UOP 10 cc at baseline. Now with worsening SOB and acidosis requiring HD. On admission, labs showed mixed metabolic acidosis with compensated respiratory alkalosis.   - nephrology following, recommendations appreciated   - planned to initiate HD on TDC placed with IR  - s/p 100 mg IV lasix with minimal UOP

## 2024-04-29 NOTE — H&P ADULT - PROBLEM SELECTOR PLAN 2
#KAT on CKD  known CKD (follows with Dr. Montesinos) now with worsening SOB and acidosis requiring HD. Currently no indications for emergent HD (K 4.4, BUN 62). VBG notable for pH 7.1, bicarb 11.   - NPO@MN for IR TLC   - nephrology following, recommendations appreciated   - bicarb drip for acidosis

## 2024-04-29 NOTE — ED PROVIDER NOTE - CRITICAL CARE ATTENDING CONTRIBUTION TO CARE
critical Care Procedure Note  Authorized and Performed by:  DO KAM Villalta  Total critical care time: Approximately   40 minutes  Due to a high probability of clinically significant, life threatening deterioration, the patient required my highest level of preparedness to intervene emergently and I personally spent this critical care time directly and personally managing the patient. This critical care time included obtaining a history; examining the patient; pulse oximetry; ordering and review of studies; arranging urgent treatment with development of a management plan; evaluation of patient's response to treatment; frequent reassessment; and, discussions with other providers.  This critical care time was performed to assess and manage the high probability of imminent, life-threatening deterioration that could result in multi-organ failure. It was exclusive of separately billable procedures and treating other patients and teaching time.  Please see MDM section and the rest of the note for further information on patient assessment and treatment.

## 2024-04-29 NOTE — H&P ADULT - PROBLEM SELECTOR PLAN 6
per last admissions appears to have hx of stent w/ vascular, unclear timeline, home meds plavix  - hold plavix pending IR line placement

## 2024-04-29 NOTE — H&P ADULT - PROBLEM SELECTOR PLAN 7
F: none  E: replete as needed  N: npo  VTE ppx: heparin gtt  GI ppx: none  [id number]  Code status: FULL

## 2024-04-29 NOTE — ED PROVIDER NOTE - PHYSICAL EXAMINATION
VITAL SIGNS: I have reviewed nursing notes and confirm.  CONSTITUTIONAL: Well appearing, in no acute distress.   SKIN:  warm and dry, no acute rash.   HEAD:  normocephalic, atraumatic.  EYES: EOM intact; conjunctiva and sclera clear.  ENT: No nasal discharge; airway clear.   NECK: Supple.  CARD: S1, S2, Regular rate and rhythm.   RESP:   on home O2 satting 93% able to speak in 4-5 word sentences mildly tachypneic but not in any respiratory distressy bilateral crackles midway up lung fields  ABD: Normal bowel sounds; soft; non-distended; non-tender; no guarding/ rebound.  EXT: Normal ROM. No peripheral edema. Pulses intact and equal b/l.Right fifth transmetatarsal amputation bilateral pitting edema 2-3+  NEURO: Alert, oriented, grossly unremarkable  PSYCH: Cooperative, mood and affect appropriate.

## 2024-04-29 NOTE — H&P ADULT - ASSESSMENT
68M PMHx AFib, PAD, DM2 w diabetic foot wounds (sp amputation left toe, on zosyn through 5/10 for tx of OM), COPD, colon cancer (colostomy bag), and CKD5, presenting with worsening shortness of breath, found to be fluid overloaded, w KAT on CKD (Cr of 7.84 increased from baseline 3.9), admitted for urgent initiation of HD.

## 2024-04-29 NOTE — H&P ADULT - NSHPPHYSICALEXAM_GEN_ALL_CORE
General: NAD, bipap in place  HEENT: PERRL, EOM, anicteric sclera, poor dentition  Cardiovascular: +S1/S2; RRR; no M/R/G  Respiratory: crackles at bilateral bases  Gastrointestinal: obese abd, soft, NT/ND; +BS x4  Extremities: WWP; trace pitting edema of lower extremities, LLE wrapped, overlying dressing clean, dry  Skin: normal color and turgor; no rash  Neurologic: AOx3, no focal deficits

## 2024-04-29 NOTE — ED PROVIDER NOTE - CLINICAL SUMMARY MEDICAL DECISION MAKING FREE TEXT BOX
68-year-old male morbidly obese with COPD on home O2 as well as worsening CKD now complaining of worsening orthopnea likely with cardiorenal/fluid overload will evaluate for CHF worsening renal failure patient may need dialysis     CBC CMP mag [place] proBNP chest x-ray flu COVID swab   EKG normal sinus rhythm 91 left bundle branch block ST depressions V4 through V6 less than 1 mm with T wave inversions similar to prior no STEMI

## 2024-04-29 NOTE — CONSULT NOTE ADULT - ASSESSMENT
Neuro:    Pulmonary:    CVS:    GI:    Endo:    Renal:    ID:    Heme:      *Not final until attending attestation*      F:  E: replete PRN  N:  GI ppx:  DVTppx:  Lines:  Code status:  Dispo:   69yo M with PMH of Chronic afib (on eliquis), DM, diabetic wounds s/p left toe amputations (currently on zosyn for pseudomonas osteo until 5/10), COPD (on home 2L, on BIPAP overnight), Stage 5 CKD (baseline Cr 3.9), ?CAD, PAD, and colon cancer (w/ colostomy bag) sent in by oupatient nephrologist for SOB. ICU consulted for urgent HD    Neuro: A&Ox3    Pulmonary:  #AHRF:  #COPD: initially presenting on NRB now on 3L NC (home o2 C 2-4L) likely 2/2 fluid overload requiring HD. BNP>7000. CXR w/ vascular congestion bilaterally. Additionally may have component of respiratory alkalosis compensating for metabolic acidosis 2/2 ARF.  - 60mg IV lasix per renal  - bipap o/n   - c/w home meds     CVS:  #A fib: home meds eliqiuis 5mg BID, coreg 6.25  - heparin gtt while pending IR  - c/w home coreg       Endo:  #DM: Last A1c 5.0 (03/2024)  - q6hr FS while NPO  - mISS     Renal:  #ESRD: known CKD (follows with Dr. Montesinos) now with worsening SOB and acidosis requiring HD. Currently no indications for emergent HD (K 4.4, BUN 62). VBG notable for pH 7.1, bicarb 11.   - NPO@MN for IR TLC   - renal consulted   - start bicarb drip for acidosis     ID:  #pseudomonal OM: recent admission found to have R foot OM, on zosyn until 5/10  - c/w zosyn until 5/10    Dispo: Tele  Discussed w/ attending Dr. Ruelas     *Not final until attending attestation*        69yo M with PMH of Chronic afib (on eliquis), DM, diabetic wounds s/p left toe amputations (currently on zosyn for pseudomonas osteo until 5/10), COPD (on home 2L, on BIPAP overnight), Stage 5 CKD (baseline Cr 3.9), ?CAD, PAD, and colon cancer (w/ colostomy bag) sent in by oupatient nephrologist for SOB. ICU consulted for urgent HD    Neuro: A&Ox3    Pulmonary:  #AHRF:  #COPD: initially presenting on NRB now on 3L NC (home o2 C 2-4L) likely 2/2 fluid overload requiring HD. BNP>7000. CXR w/ vascular congestion bilaterally. Additionally may have component of respiratory alkalosis compensating for metabolic acidosis 2/2 ARF.  - 60mg IV lasix per renal  - bipap o/n   - c/w home meds     CVS:  #A fib: home meds eliqiuis 5mg BID, coreg 6.25  - heparin gtt while pending IR  - c/w home coreg     #PAD: per last admissions appears to have hx of stent w/ vascular, unclear timeline, home meds plavix  - obtain collateral from vascular outpt in AM as plavix may cause barrier to IR line placement  - obtain collateral from pt on last dose     Endo:  #DM: Last A1c 5.0 (03/2024)  - q6hr FS while NPO  - mISS     Renal:  #ESRD: known CKD (follows with Dr. Montesinos) now with worsening SOB and acidosis requiring HD. Currently no indications for emergent HD (K 4.4, BUN 62). VBG notable for pH 7.1, bicarb 11.   - NPO@MN for IR TLC   - renal consulted   - start bicarb drip for acidosis     ID:  #pseudomonal OM: recent admission found to have R foot OM, on zosyn until 5/10  - c/w zosyn until 5/10    Dispo: Tele  Discussed w/ attending Dr. Ruelas     *Not final until attending attestation*

## 2024-04-29 NOTE — CONSULT NOTE ADULT - SUBJECTIVE AND OBJECTIVE BOX
HPI:  67yo M with unrevascularized 3V CAD (RCA, Lcx, LAD), aortic stenosis s/p TAVR 2022, PAD s/p angioplasty, HFrEF (EF 35-40%), Afib, CKD 5 (baseline sCr 3.9), colon cancer, DM, presents with worsening dyspnea as advised by nephrologist (Dr. Montesinos). He states that she has told him he will need to start HD soon, and that he is to present to ED w worsening le edema or sob, as he may require more urgent initiation of HD. Denies associated chest pain or tightness, as well as worsening swelling of his lower extremities. Denies fever, chills, headache, abd pain, nausea, vomiting, or diarrhea.     Patient is admitted with volume overload, found to have KAT on CKD with plans to initiate urgent HD. Cardiology consulted for pre operative evaluation for tunneled HD catheter placement.   On evaluation, patient states he is unable to walk more than 1 block or up one flight of stairs. His last ischemic evaluation was in 2022 before his TAVR where he had a [facility]. Since then, he denies having had an ischemic evaluation. He reports occasional chest pain but most of his symptoms are dyspnea on exertion and orthopnea. Unclear why he was not revascularized however he states that it was because of his renal function.     ROS: A 10-point review of systems was otherwise negative.    PAST MEDICAL & SURGICAL HISTORY:  COPD with hypoxia      Chronic kidney disease (CKD)      CAD (coronary artery disease)      Type 2 diabetes mellitus      Diabetic foot ulcers      PAD (peripheral artery disease)      Status post amputation of toe          SOCIAL HISTORY:  FAMILY HISTORY:  FH: diabetes mellitus        ALLERGIES: 	  vancomycin (Unknown)            MEDICATIONS:  carvedilol 6.25 milliGRAM(s) Oral every 12 hours  heparin  Infusion 1800 Unit(s)/Hr IV Continuous <Continuous>  influenza  Vaccine (HIGH DOSE) 0.7 milliLiter(s) IntraMuscular once  piperacillin/tazobactam IVPB.. 4.5 Gram(s) IV Intermittent every 12 hours      HOME MEDICATIONS:  carvedilol 6.25 mg oral tablet: 1 tab(s) orally every 12 hours  Cozaar 25 mg oral tablet: 1 tab(s) orally once a day  Crestor 20 mg oral tablet: 1 tab(s) orally once a day (at bedtime)  Eliquis 5 mg oral tablet: 1 tab(s) orally once a day  epoetin ori: 10,000 unit(s) once a week weekly  Lasix 40 mg oral tablet: 1 tab(s) orally once a day  oxyCODONE 5 mg oral tablet: 1 tab(s) orally every 6 hours As needed Moderate Pain (4 - 6)  oxyCODONE 7.5 mg oral tablet: 1 tab(s) orally every 6 hours As needed Severe Pain (7 - 10)  piperacillin-tazobactam: 4.5 gram(s) every 12 hours 4.5 grams IV q12 x 6 weeks (3/29/24 - 5/10/24)  Plavix 75 mg oral tablet: 1 tab(s) orally once a day  Toprol-XL 25 mg oral tablet, extended release: 1 tab(s) orally once a day  Zoloft 50 mg oral tablet: 1 tab(s) orally once a day      PHYSICAL EXAM:  Height (cm): 165.1 (04-29 @ 00:11)  Weight (kg): 108.9 (04-29 @ 00:11)  BMI (kg/m2): 40 (04-29 @ 00:11)  BSA (m2): 2.14 (04-29 @ 00:11)    I/O Summary 24H    IN: 38 mL / OUT: 0 mL / NET: 38 mL        T(F): 97.9 (04-29-24 @ 10:56), Max: 98.3 (04-29-24 @ 06:48)  HR: 80 (04-29-24 @ 13:05) (80 - 95)  BP: 118/46 (04-29-24 @ 12:30) (104/66 - 145/92)  BP(mean): 66 (04-29-24 @ 12:30) (66 - 108)  ABP: --  ABP(mean): --  RR: 18 (04-29-24 @ 13:05) (18 - 20)  SpO2: 98% (04-29-24 @ 13:05) (97% - 100%)    GEN: Awake, on BIPAP uncomfortable   HEENT: NCAT, PERRL, EOMI. Mucosa moist. +JVD.   RESP: bilateral crackles   CV: RRR, normal s1/s2. No m/r/g.  ABD: Soft, NTND. BS+  EXT: Warm. No edema, clubbing, or cyanosis.   NEURO: AAOx3. No focal deficits.      	  LABS:	 	    CARDIAC MARKERS:              CBC 04-29-24 @ 08:50                        9.0    11.29 )-----------( 288                   29.6       Hgb trend: 9.0 <-- , 7.5 <--   WBC trend: 11.29 <-- , 10.04 <--     CMP 04-29-24 @ 08:50    129<L>  |  100  |  68<H>  ----------------------------<  105<H>  4.5   |  13<L>  |  7.90<H>    Ca    9.2      04-29-24 @ 08:50  Phos  7.9     04-29  Mg     2.0     04-29    TPro  8.2  /  Alb  4.2  /  TBili  0.3  /  DBili  x   /  AST  17  /  ALT  11  /  AlkPhos  80     04-29      Serum Cr trend: 7.90 <-- , 7.84 <--   proBNP:   Lipid Profile:   HgA1c:   TSH:     TELEMETRY: 	    ECG:  	  RADIOLOGY:   ECHO:  STRESS:  CATH:

## 2024-04-29 NOTE — H&P ADULT - HISTORY OF PRESENT ILLNESS
69yo M with PMH of Chronic afib on eliquis/apixaban, DM, diabetic wounds s/p left toe amputations, COPD (on home 2L, on BIPAP overnight), Stage 5 CKD (baseline Cr 3.9), ?CAD, PAD, and colon cancer (w/ colostomy bag), presenting with worsening SOB as advised by nephrologist Dr Montesinos.. He states that she has told him he will need to start HD soon, and that he is to present to ED w worsening le edema or sob, as he may require more urgent initiation of HD. Denies associated chest pain or tightness, as well as worsening swelling of his lower extremities. Denies fever, chills, headache, abd pain, nausea, vomiting, or diarrhea.     ED Course -   Vitals: 97.8, HR 95, 137/72, 100% on non-rebreather, quickly transitioned to home 3L w spo2 100%  Labs notable for proBNP >70,000, na 128, bicarb 9, ag 18, bun/cr 62/7.84, troponins 209, hg 7.5, platelets 252  EKG w t wave inversions in all leads, similar to prior  CXR w bilaterlly increased vascular congestion  lasix 40mg iv x1, 60mg iv x1

## 2024-04-29 NOTE — PROGRESS NOTE ADULT - PROBLEM SELECTOR PLAN 9
H/o of HFrEF with severe aortic stenosis, now s/p TAVR 03/2022 and still with persistent LV dysfunction. Followed by Dr. Aldrich at [facility].  TTE 2021: Moderate LV dilatation with EF 35% with RWA, bicuspid aortic valve severe LFLG aortic stenosis (MG 50, CEDRIC 0.68), dilated ascending aorta 4.5cm  TTE 7/2022: Normal LV size and thickness, s/p TAVR (peak velocity 2.2 m/s, DVI 0.43) with trace AR, normal RV function, dilated ascending aorta 4.8cm  - home meds on lasix prn, coreg and eliquis  - appears to be in exacerbation overload on exam with bibasilar crackles and peripheral edema  - GDMT held iso worsening CKD (losartan and SGLTi-2)  - f/u repeat TTE

## 2024-04-29 NOTE — PROGRESS NOTE ADULT - SUBJECTIVE AND OBJECTIVE BOX
Patient seen and examined bedside on hemodialysis. Awake alert, stable HDS, BP at start 130/68. Tolerating procedure will aim for 2L net UF   Continue HD as prescribed below   Hemodialysis Treatment.:     Schedule: Once, Modality: Hemodialysis, Access: Femoral Central Venous Catheter    Dialyzer: Optiflux [id number], Time: 120 Min    Blood Flow: 200 mL/Min , Dialysate Flow: 400 mL/Min, Dialysate Temp: 36.5, Tubinmm (Adult)    Target Fluid Removal: 2 Liters    Dialysate Electrolytes (mEq/L): Potassium 2, Calcium 2.5, Sodium 138, Bicarbonate 35     Physical Exam:   General: No acute distress, able to speak in full sentences, sitting up in bed tolerating HD   HEENT: NCAT, EOMI, bipap mask in place  CHEST/LUNG: Diminished breath sounds, bilateral crackles  HEART: Regular rate  ABDOMEN: Soft, distended, non-tender  EXTREMITIES: 1-2+ LE edema  Neurology: Awake, alert, interactive  SKIN: No rash on exposed skin  Access: Left fem HD cath accessed

## 2024-04-29 NOTE — ED PROVIDER NOTE - OBJECTIVE STATEMENT
69yo M with PMH of Chronic afib on elliquis/apixaban, DM, diabetic wounds s/p left toe amputations, COPD (on home 2L, on BIPAP overnight), Stage 5 CKD (baseline Cr 3.9), ?CAD, PAD, and colon cancer (w/ colostomy bag) Here for worsening creatinine as per his nephrologist was told to come in if he is not feeling well has had worsening orthopnea and lower extremity edema cannot move or lay flat without becoming winded and short of breath.  Denies associated chest pressure.  Has no fever chills has a mild cough.  No symptoms systemic signs or symptoms otherwise.

## 2024-04-29 NOTE — H&P ADULT - ATTENDING COMMENTS
68 M with afib (on Eliquis), CAD, PAD, COPD, chronic respiratory failure (on home 2L, on BIPAP overnight), DM, diabetic wounds s/p left toe amputations (currently on zosyn for pseudomonas osteo until 5/10), Stage 5 CKD (baseline Cr 3.9), and colon cancer (w/ colostomy bag) presents with SOB. Physical exam as above. CXR showed increased interstitial markings.   1. KAT on CKD  2. Pulmonary edema due to KAT  3. COPD without exacerbation  4. Chronic respiratory failure  - furosemide  - BIPAP for work of breathing  - HD in AM  - tunneled HD catheter by IR in AM

## 2024-04-29 NOTE — CONSULT NOTE ADULT - ATTENDING COMMENTS
67 yo man with CKD 5- uremia, SOB, anemia known to renal service- trying to get him to come to [facility] ED for initaition of dialysis. IVFe possible Tx   he has been reluctant but did come to ED overnight  was placed on BiPAP  will need to get HD line placed  will arrange for dialysis  case reviewed in detail with pt's son in law  h/o HTN, HLD, DM2, AS s/p TAVR, afib on eliquis, copd on 2L home O2 and bipap qhs, colon ca s/p resection w/ colostomy, PAD
Called to Assess Mr Wylie in time sensitive manner prior to Planned HD tunneled Cath scheduled shortly.  Briefly he is a 69 yo M with unrevascularized 3V CAD (Deemed High risk for CABG due to Renal function), AS s/p TAVR March 2022 at [facility], PAD s/p angioplasty, HFrEF (EF 35-40%), Afib, CKD 5 (baseline sCr 3.9), colon cancer, DM, admitted with worsening dyspnea  with KAT on CKD and volume overload, with plans for urgent dialysis. Cardiology consulted for pre operative risk assessment for tunneled HD catheter placement.   Currently patient not in Active ACS, decompensated HF or with any arrythmia. There are no active contraindication to proceeding with time sensitive intervention to optimize volume status as patient nearly anuric. Patient is considered at intermediate risk for a low risk procedure. We will resume GDMT post HD session. Cardiology service will continue to follow with you.
68 M with afib (on Eliquis), CAD, PAD, COPD, chronic respiratory failure (on home 2L, on BIPAP overnight), DM, diabetic wounds s/p left toe amputations (currently on zosyn for pseudomonas osteo until 5/10), Stage 5 CKD (baseline Cr 3.9), and colon cancer (w/ colostomy bag) presents with SOB. Physical exam as above. CXR showed increased interstitial markings.   1. KAT on CKD  2. Pulmonary edema due to KAT  3. COPD without exacerbation  4. Chronic respiratory failure  - furosemide  - BIPAP for work of breathing  - HD in AM  - tunneled HD catheter by IR in AM

## 2024-04-29 NOTE — PROGRESS NOTE ADULT - PROBLEM SELECTOR PLAN 4
Recently admitted to [facility] and found to have OM of R foot +pseudomonal, d/c'ed on IV zosyn through 05/10   - c/w zosyn until 5/10  - seen by podiatry, wound dressing changed  - repeat XR foot with 3 views which showed no tracking gas collections or gross radiographic evidence for OM

## 2024-04-29 NOTE — CHART NOTE - NSCHARTNOTEFT_GEN_A_CORE
Nephrology paged for:   68M with CKD5, Obese with COPD on home O2, now complaining of worsening orthopnea. Hemodynamically stable comfortable on nasal cannula, on home BiPAP, given additional 60 mg IV Lasix (100mg total).     Pte is s/p CCM eval  Labs/Radiology reviewed.     Imp: CKD5.     Plan:   Now s/p 100mg Lasix IV.   Place pte in NPPV  Avoid HCO3 iso ZAYDA/COPD.   No HD emergency, but will start HD during this admission.   NPO, pre op labs, in case of TDC on 4/29.   Full consult in AM Nephrology paged for:   68M with CKD5, Obese with COPD on home O2, now complaining of worsening orthopnea. Hemodynamically stable comfortable on nasal cannula, on home BiPAP, given additional 60 mg IV Lasix (100mg total).     Pte is s/p CCM eval  Labs/Radiology reviewed.     Imp: CKD5.     Plan:   Now s/p 100mg Lasix IV.   Place pte in NPPV  Avoid HCO3 drip iso ZAYDA/COPD.   No HD emergency, but will start HD during this admission.   NPO, pre op labs, in case of TDC on 4/29.   Full consult in AM Nephrology paged for:   68M with CKD5, Obese with COPD on home O2, now complaining of worsening orthopnea. Hemodynamically stable comfortable on nasal cannula, on home BiPAP, given additional 60 mg IV Lasix (100mg total).     Pte is s/p CCM eval  Labs/Radiology reviewed.     Imp: CKD5.   Mixed AGMA and Respiratory acidosis.     Plan:   Now s/p 100mg Lasix IV.   Place pte in NPPV  Avoid HCO3 drip iso ZAYDA/COPD.   No HD emergency, but will start HD during this admission.   NPO, pre op labs, in case of TDC on 4/29.   Full consult in AM

## 2024-04-29 NOTE — PROGRESS NOTE ADULT - PROBLEM SELECTOR PLAN 7
h/o severe PAD, s/p R LE angioplasty in 1/2024 with balloon angioplasty in proximal AT and below the knee popliteal with 1 conventional and 1 drug eluting stent. At home on plavix 75 mg qd  - hold plavix prior to planned IR procedure  - restart once cleared

## 2024-04-29 NOTE — PROGRESS NOTE ADULT - PROBLEM SELECTOR PLAN 5
h/o of chronic AF rate controlled. At home on eliqiuis 5mg BID, coreg 6.25  - heparin gtt while pending IR  - plan to restart eliquis s/p TDC  - c/w home coreg

## 2024-04-29 NOTE — CONSULT NOTE ADULT - ASSESSMENT
69 yo M w/ PMH of CK5 (last b/l sCr 4.3), HTN, HLD, DM2, AS s/p TAVR, afib on eliquis, copd on 2L home O2 and bipap qhs, colon ca s/p resection w/ colostomy, PAD presenting with worsening shortness of breath, orthopnea, and LE edema. Here found to be hypoxic with increased WOB and started on BiPAP. sCr 7.8, BUN 62, bicarb 9 w/ venous pH 7.12, trop 209. Imaging with pulm vascular congestion. No reported uremic symptoms. Nephrology consulted for management of KAT on CKD and initiation of dialysis.    KAT on CKDV - likely progressed to ESRD and now with worsening volume overload not responsive to diuretics, will plan to initiate HD this admission  Likely mixed respiratory and metabolic acidosis  - Plan for TDC per IR vs temp cath and will arrange first HD treatment today with 1-2L UF as tolerated  - Send hep b and c panel, quantiferon  - Renal diet, fluid restriction <1.5L/day  - Strict I&O, daily standing weights  - Ensure no urinary retention, bladder scan daily  - Social work consult to arrange outpatient HD  - Management of respiratory failure per primary team  - Check PTH, vit D  - GDMT per Cardiology

## 2024-04-29 NOTE — H&P ADULT - PROBLEM SELECTOR PLAN 3
pseudomonal OM: recent admission found to have R foot OM, on zosyn until 5/10  - c/w zosyn until 5/10  - podiatry consult in am

## 2024-04-29 NOTE — H&P ADULT - PROBLEM SELECTOR PLAN 1
initially presenting on NRB now on 3L NC (home o2 C 2-4L) likely 2/2 fluid overload requiring HD. BNP>7000. CXR w/ vascular congestion bilaterally. Additionally may have component of respiratory alkalosis compensating for metabolic acidosis 2/2 ARF.  - 60mg IV lasix per renal  - bipap o/n   - c/w home meds

## 2024-04-29 NOTE — PROGRESS NOTE ADULT - PROBLEM SELECTOR PLAN 1
Patient with worsening SOB over the last weeks to months, oscar multifactorial iso COPD/ZAYDA vs obesity vs R-sided vs diaphragmatic elevation vs CHF (off GDMT) vs obstructive CAD vs acute on chronic renal failure.   Initially presenting on NRB (home O2 NC2-4L) likely 2/2 fluid overload requiring urgent HD. BNP>7000.   CXR w/ vascular congestion bilaterally.   Additionally may have component of respiratory alkalosis compensating for mixed metabolic acidosis 2/2 ARF.  - s/p 100 mg IV lasix x1   - intermittently asking for BiPAP with NC on, although no hypoxia. Remains at % on 4L NC

## 2024-04-29 NOTE — CONSULT NOTE ADULT - ASSESSMENT
67yo M with unrevascularized 3V CAD (RCA, Lcx, LAD), aortic stenosis s/p TAVR 2022, PAD s/p angioplasty, HFrEF (EF 35-40%), Afib, CKD 5 (baseline sCr 3.9), colon cancer, DM, presents with worsening dyspnea as advised by nephrologist (Dr. Montesinos). Patient is admitted with KAT on CKD and volume overload, with plans for urgent dialysis. Cardiology consulted for pre operative risk assessment for tunneled HD catheter placement.       Review of studies:   67yo M with unrevascularized 3V CAD, aortic stenosis s/p TAVR March 2022 at [facility], PAD s/p angioplasty, HFrEF (EF 35-40%), Afib, CKD 5 (baseline sCr 3.9), colon cancer, DM, presents with worsening dyspnea as advised by nephrologist (Dr. Montesinos). Patient is admitted with KAT on CKD and volume overload, with plans for urgent dialysis. Cardiology consulted for pre operative risk assessment for tunneled HD catheter placement.       Review of studies:  [facility] 12/2021: 80-90% LPL, 70-80% OM1 and D1, 60-70% distal RCA  TTE 2021: Moderate LV dilatation with EF 35% with RWA, bicuspid aortic valve severe LFLG aortic stenosis (MG 50, CEDRIC 0.68), dilated ascending aorta 4.5cm  TTE 7/2022: Normal LV size and thickness, s/p TAVR (peak velocity 2.2 m/s, DVI 0.43) with trace AR, normal RV function, dilated ascending aorta 4.8cm  EKG 4/29/24: NSR with LBBB, LVH, ST-T changes due to strain      Home medications: Eliquis 5mg BID, Plavix 75mg, Atorvastatin 40mg QD, Coreg 6.25mg BID, No longer on Losartan 25mg QD d/t CKD, SGLT2/MRA deferred d/t CKD    #Pre operative evaluation  Patient with plan for tunneled HD cathter placement with IR on 4/29  METS <4  EKG noted as above  RCRI 3   - Patient is high risk for a low risk procedure. He can proceed to tunneled HD catheter placement without any further cardiac work up - troponin elevation is demand due to renal dysfunction and underlying CAD   - Obtain TTE        #3VCAD  #Troponin elevation  Patient with troponin elevation that is likely demand due to KAT and underlying CAD. Patient denies any chest pain.    - Patient has known underlying 3VCAD that is unrevascularized. Would re start home plavix 75mg QD and atorvastatin 40mg QD.    - Patient follows with Dr. Aldrich (cardiologist at [facility]). Per family, patient reportedly was considered for CABG at [place] however was deemed too high risk. Will need collateral and would not recommend ischemic evaluation during this admission especially in the absence of chest pain   - TTE as above     #HFrEF  Patient with known HFrEF and has persistent LV dysfunction despite TAVR. Suspect patient has ischemic cardiomyopathy.    - Plan for HD today for volume management, patient is grossly overloaded on exam   - Would restart home coreg 6.25mg BID, remainder of GDMT on hold for now due to renal dysfunction requiring HD    #Afib  Patient with history of Afib, currently in NSR   - Continue IV heparin drip, can transition to eliquis if no further procedures planned     #PAD s/p angioplasty   - Plavix and Statin as above     #KAT on CKD  Patient with known CKD, now here with KAT on CKD with plans to initiate HD. Follows with Dr. Montesinos.    - Management per renal, plan for tunneled HD cath placement and initiation of HD today     Case d/w attending     69yo M with unrevascularized 3V CAD, aortic stenosis s/p TAVR March 2022 at [facility], PAD s/p angioplasty, HFrEF (EF 35-40%), Afib, CKD 5 (baseline sCr 3.9), colon cancer, DM, presents with worsening dyspnea as advised by nephrologist (Dr. Montesinos). Patient is admitted with KAT on CKD and volume overload, with plans for urgent dialysis. Cardiology consulted for pre operative risk assessment for tunneled HD catheter placement.       Review of studies:  [facility] 12/2021: 80-90% LPL, 70-80% OM1 and D1, 60-70% distal RCA  TTE 2021: Moderate LV dilatation with EF 35% with RWA, bicuspid aortic valve severe LFLG aortic stenosis (MG 50, CEDRIC 0.68), dilated ascending aorta 4.5cm  TTE 7/2022: Normal LV size and thickness, s/p TAVR (peak velocity 2.2 m/s, DVI 0.43) with trace AR, normal RV function, dilated ascending aorta 4.8cm  EKG 4/29/24: NSR with LBBB, LVH, ST-T changes due to strain      Home medications: Eliquis 5mg BID, Plavix 75mg, Atorvastatin 40mg QD, Coreg 6.25mg BID, No longer on Losartan 25mg QD d/t CKD, SGLT2/MRA deferred d/t CKD    #Pre operative evaluation  Patient with plan for tunneled HD cathter placement with IR on 4/29  METS <4  EKG noted as above  RCRI 3   - Patient is high risk for a low risk procedure. He can proceed to tunneled HD catheter placement without any further cardiac work up - troponin elevation is demand due to renal dysfunction and underlying CAD   - Obtain TTE        #3VCAD  #Troponin elevation  Patient with troponin elevation that is likely demand due to KAT and underlying CAD. Patient denies any chest pain.    - Patient has known underlying 3VCAD that is unrevascularized. Would re start home plavix 75mg QD and atorvastatin 40mg QD.    - Patient follows with Dr. Aldrich (cardiologist at [facility]). Per family, patient reportedly was considered for CABG at [place] however was deemed too high risk. Will need collateral and would not recommend ischemic evaluation during this admission especially in the absence of chest pain, however patient should follow up with his outpatient cardiologist for staged PCI.    - TTE as above     #HFrEF  Patient with known HFrEF and has persistent LV dysfunction despite TAVR. Suspect patient has ischemic cardiomyopathy.    - Plan for HD today for volume management, patient is grossly overloaded on exam   - Would restart home coreg 6.25mg BID, remainder of GDMT on hold for now due to renal dysfunction requiring HD     #Afib  Patient with history of Afib, currently in NSR   - Continue IV heparin drip, can transition to eliquis if no further procedures planned     #PAD s/p angioplasty   - Plavix and Statin as above     #KAT on CKD  Patient with known CKD, now here with KAT on CKD with plans to initiate HD. Follows with Dr. Montesinos.    - Management per renal, plan for tunneled HD cath placement and initiation of HD today     Case d/w attending     69yo M with unrevascularized 3V CAD, aortic stenosis s/p TAVR March 2022 at [place], PAD s/p angioplasty, HFrEF (EF 35-40%), Afib, CKD 5 (baseline sCr 3.9), colon cancer, DM, presents with worsening dyspnea as advised by nephrologist (Dr. Montesinos). Patient is admitted with KAT on CKD and volume overload, with plans for urgent dialysis. Cardiology consulted for pre operative risk assessment for tunneled HD catheter placement.       Review of studies:  [facility] 12/2021: 80-90% LPL, 70-80% OM1 and D1, 60-70% distal RCA  TTE 2021: Moderate LV dilatation with EF 35% with RWA, bicuspid aortic valve severe LFLG aortic stenosis (MG 50, CEDRIC 0.68), dilated ascending aorta 4.5cm  TTE 7/2022: Normal LV size and thickness, s/p TAVR (peak velocity 2.2 m/s, DVI 0.43) with trace AR, normal RV function, dilated ascending aorta 4.8cm  EKG 4/29/24: NSR with LBBB, LVH, ST-T changes due to strain      Home medications: Eliquis 5mg BID, Plavix 75mg, Atorvastatin 40mg QD, Coreg 6.25mg BID, No longer on Losartan 25mg QD d/t CKD, SGLT2/MRA deferred d/t CKD    #Pre operative evaluation  Patient with plan for tunneled HD cathter placement with IR on 4/29  METS <4  EKG noted as above  RCRI 3   - Patient is intermediate-high risk for a low risk procedure. He has known unrevascularized CAD and his elevated troponins are in the setting of demand. He can and should proceed with tunneled HD cathter placement which is an urgent procedure.    - Obtain TTE        #3VCAD  #Troponin elevation  Patient with troponin elevation that is likely demand due to KAT and underlying CAD. Patient denies any chest pain.    - Patient has known underlying 3VCAD that is unrevascularized. Would re start home plavix 75mg QD and atorvastatin 40mg QD.    - Patient follows with Dr. Aldrich (cardiologist at [facility]). Per family, patient reportedly was considered for CABG at [place] however was deemed too high risk. Will need collateral and would not recommend ischemic evaluation during this admission especially in the absence of chest pain, however patient should follow up with his outpatient cardiologist for staged PCI.    - TTE as above     #HFrEF  Patient with known HFrEF and has persistent LV dysfunction despite TAVR. Suspect patient has ischemic cardiomyopathy.    - Plan for HD today for volume management, patient is grossly overloaded on exam   - Would restart home coreg 6.25mg BID, remainder of GDMT on hold for now due to renal dysfunction requiring HD     #Afib  Patient with history of Afib, currently in NSR   - Continue IV heparin drip, can transition to eliquis if no further procedures planned     #PAD s/p angioplasty   - Plavix and Statin as above     #KAT on CKD  Patient with known CKD, now here with KAT on CKD with plans to initiate HD. Follows with Dr. Montesinos.    - Management per renal, plan for tunneled HD cath placement and initiation of HD today     Case d/w attending     69yo M with unrevascularized 3V CAD, aortic stenosis s/p TAVR March 2022 at [facility], PAD s/p angioplasty, HFrEF (EF 35-40%), Afib, CKD 5 (baseline sCr 3.9), colon cancer, DM, presents with worsening dyspnea as advised by nephrologist (Dr. Montesinos). Patient is admitted with KAT on CKD and volume overload, with plans for urgent dialysis. Cardiology consulted for pre operative risk assessment for tunneled HD catheter placement.       Review of studies:  [facility] 12/2021: 80-90% LPL, 70-80% OM1 and D1, 60-70% distal RCA  TTE 2021: Moderate LV dilatation with EF 35% with RWA, bicuspid aortic valve severe LFLG aortic stenosis (MG 50, CEDRIC 0.68), dilated ascending aorta 4.5cm  TTE 7/2022: Normal LV size and thickness, s/p TAVR (peak velocity 2.2 m/s, DVI 0.43) with trace AR, normal RV function, dilated ascending aorta 4.8cm  EKG 4/29/24: NSR with LBBB, LVH, ST-T changes due to strain      Home medications: Eliquis 5mg BID, Plavix 75mg, Atorvastatin 40mg QD, Coreg 6.25mg BID, No longer on Losartan 25mg QD d/t CKD, SGLT2/MRA deferred d/t CKD    #Pre operative evaluation  Patient with plan for tunneled HD cathter placement with IR on 4/29  METS <4  EKG noted as above  RCRI 3   - Patient is intermediate-high risk for a low risk procedure. He has known unrevascularized CAD and his elevated troponins are in the setting of demand. He can and should proceed with tunneled HD cathter placement which is an urgent, low risk procedure.    - Obtain TTE        #3VCAD  #Troponin elevation  Patient with troponin elevation that is likely demand due to KAT and underlying CAD. Patient denies any chest pain.    - Patient has known underlying 3VCAD that is unrevascularized. Would re start home plavix 75mg QD and atorvastatin 40mg QD.    - Patient follows with Dr. Aldrich (cardiologist at [facility]). Per family, patient reportedly was considered for CABG at [place] however was deemed too high risk. Will need collateral and would not recommend ischemic evaluation during this admission especially in the absence of chest pain, however patient should follow up with his outpatient cardiologist for staged PCI.    - TTE as above     #HFrEF  Patient with known HFrEF and has persistent LV dysfunction despite TAVR. Suspect patient has ischemic cardiomyopathy.    - Plan for HD today for volume management, patient is grossly overloaded on exam   - Would restart home coreg 6.25mg BID, remainder of GDMT on hold for now due to renal dysfunction requiring HD     #Afib  Patient with history of Afib, currently in NSR   - Continue IV heparin drip, can transition to eliquis if no further procedures planned     #PAD s/p angioplasty   - Plavix and Statin as above     #KAT on CKD  Patient with known CKD, now here with KAT on CKD with plans to initiate HD. Follows with Dr. Montesinos.    - Management per renal, plan for tunneled HD cath placement and initiation of HD today     Case d/w attending     69yo M with unrevascularized 3V CAD, aortic stenosis s/p TAVR March 2022 at [facility], PAD s/p angioplasty, HFrEF (EF 35-40%), Afib, CKD 5 (baseline sCr 3.9), colon cancer, DM, presents with worsening dyspnea as advised by nephrologist (Dr. Montesinos). Patient is admitted with KAT on CKD and volume overload, with plans for urgent dialysis. Cardiology consulted for pre operative risk assessment for tunneled HD catheter placement.       Review of studies:  [facility] 12/2021: 80-90% LPL, 70-80% OM1 and D1, 60-70% distal RCA  TTE 2021: Moderate LV dilatation with EF 35% with RWA, bicuspid aortic valve severe LFLG aortic stenosis (MG 50, CEDRIC 0.68), dilated ascending aorta 4.5cm  TTE 7/2022: Normal LV size and thickness, s/p TAVR (peak velocity 2.2 m/s, DVI 0.43) with trace AR, normal RV function, dilated ascending aorta 4.8cm  EKG 4/29/24: NSR with LBBB, LVH, ST-T changes due to strain      Home medications: Eliquis 5mg BID, Plavix 75mg, Atorvastatin 40mg QD, Coreg 6.25mg BID, No longer on Losartan 25mg QD d/t CKD, SGLT2/MRA deferred d/t CKD    #Pre operative evaluation  Patient with plan for tunneled HD cathter placement with IR on 4/29  METS <4  EKG noted as above  RCRI 3   - Patient is intermediate risk for a low risk procedure. He has known unrevascularized CAD and his elevated troponins are in the setting of demand. He can and should proceed with tunneled HD cathter placement which is an urgent, low risk procedure.    - Obtain TTE        #3VCAD  #Troponin elevation  Patient with troponin elevation that is likely demand due to KAT and underlying CAD. Patient denies any chest pain.    - Patient has known underlying 3VCAD that is unrevascularized. Would re start home plavix 75mg QD and atorvastatin 40mg QD.    - Patient follows with Dr. Aldrich (cardiologist at [facility]). Per family, patient reportedly was considered for CABG at [place] however was deemed too high risk. Will need collateral and would not recommend ischemic evaluation during this admission especially in the absence of chest pain, however patient should follow up with his outpatient cardiologist for staged PCI.    - TTE as above     #HFrEF  Patient with known HFrEF and has persistent LV dysfunction despite TAVR. Suspect patient has ischemic cardiomyopathy.    - Plan for HD today for volume management, patient is grossly overloaded on exam   - Would restart home coreg 6.25mg BID, remainder of GDMT on hold for now due to renal dysfunction requiring HD     #Afib  Patient with history of Afib, currently in NSR   - Continue IV heparin drip, can transition to eliquis if no further procedures planned     #PAD s/p angioplasty   - Plavix and Statin as above     #KAT on CKD  Patient with known CKD, now here with KAT on CKD with plans to initiate HD. Follows with Dr. Montesinos.    - Management per renal, plan for tunneled HD cath placement and initiation of HD today     Case d/w attending

## 2024-04-30 ENCOUNTER — LABORATORY RESULT (OUTPATIENT)
Age: 69
End: 2024-04-30

## 2024-04-30 LAB
24R-OH-CALCIDIOL SERPL-MCNC: 25.2 NG/ML — LOW (ref 30–80)
ALBUMIN SERPL ELPH-MCNC: 3.3 G/DL — SIGNIFICANT CHANGE UP (ref 3.3–5)
ALP SERPL-CCNC: 62 U/L — SIGNIFICANT CHANGE UP (ref 40–120)
ALT FLD-CCNC: 9 U/L — LOW (ref 10–45)
ANION GAP SERPL CALC-SCNC: 15 MMOL/L — SIGNIFICANT CHANGE UP (ref 5–17)
APTT BLD: 38 SEC — HIGH (ref 24.5–35.6)
AST SERPL-CCNC: 14 U/L — SIGNIFICANT CHANGE UP (ref 10–40)
BASOPHILS # BLD AUTO: 0.04 K/UL — SIGNIFICANT CHANGE UP (ref 0–0.2)
BASOPHILS NFR BLD AUTO: 0.5 % — SIGNIFICANT CHANGE UP (ref 0–2)
BILIRUB SERPL-MCNC: 0.3 MG/DL — SIGNIFICANT CHANGE UP (ref 0.2–1.2)
BUN SERPL-MCNC: 58 MG/DL — HIGH (ref 7–23)
CALCIUM SERPL-MCNC: 8 MG/DL — LOW (ref 8.4–10.5)
CALCIUM SERPL-MCNC: 8.4 MG/DL — SIGNIFICANT CHANGE UP (ref 8.4–10.5)
CHLORIDE SERPL-SCNC: 102 MMOL/L — SIGNIFICANT CHANGE UP (ref 96–108)
CO2 SERPL-SCNC: 16 MMOL/L — LOW (ref 22–31)
CREAT SERPL-MCNC: 6.78 MG/DL — HIGH (ref 0.5–1.3)
CRP SERPL-MCNC: 124.3 MG/L — HIGH (ref 0–4)
EGFR: 8 ML/MIN/1.73M2 — LOW
EOSINOPHIL # BLD AUTO: 0.25 K/UL — SIGNIFICANT CHANGE UP (ref 0–0.5)
EOSINOPHIL NFR BLD AUTO: 3.3 % — SIGNIFICANT CHANGE UP (ref 0–6)
ERYTHROCYTE [SEDIMENTATION RATE] IN BLOOD: 99 MM/HR — HIGH
GLUCOSE BLDC GLUCOMTR-MCNC: 119 MG/DL — HIGH (ref 70–99)
GLUCOSE BLDC GLUCOMTR-MCNC: 133 MG/DL — HIGH (ref 70–99)
GLUCOSE SERPL-MCNC: 123 MG/DL — HIGH (ref 70–99)
HCT VFR BLD CALC: 22.9 % — LOW (ref 39–50)
HGB BLD-MCNC: 7.2 G/DL — LOW (ref 13–17)
IMM GRANULOCYTES NFR BLD AUTO: 0.5 % — SIGNIFICANT CHANGE UP (ref 0–0.9)
INR BLD: 1.24 — HIGH (ref 0.85–1.18)
LYMPHOCYTES # BLD AUTO: 1.73 K/UL — SIGNIFICANT CHANGE UP (ref 1–3.3)
LYMPHOCYTES # BLD AUTO: 22.6 % — SIGNIFICANT CHANGE UP (ref 13–44)
MAGNESIUM SERPL-MCNC: 1.9 MG/DL — SIGNIFICANT CHANGE UP (ref 1.6–2.6)
MCHC RBC-ENTMCNC: 27.7 PG — SIGNIFICANT CHANGE UP (ref 27–34)
MCHC RBC-ENTMCNC: 31.4 GM/DL — LOW (ref 32–36)
MCV RBC AUTO: 88.1 FL — SIGNIFICANT CHANGE UP (ref 80–100)
MONOCYTES # BLD AUTO: 1.19 K/UL — HIGH (ref 0–0.9)
MONOCYTES NFR BLD AUTO: 15.5 % — HIGH (ref 2–14)
NEUTROPHILS # BLD AUTO: 4.41 K/UL — SIGNIFICANT CHANGE UP (ref 1.8–7.4)
NEUTROPHILS NFR BLD AUTO: 57.6 % — SIGNIFICANT CHANGE UP (ref 43–77)
NRBC # BLD: 0 /100 WBCS — SIGNIFICANT CHANGE UP (ref 0–0)
PHOSPHATE SERPL-MCNC: 6.4 MG/DL — HIGH (ref 2.5–4.5)
PLATELET # BLD AUTO: 237 K/UL — SIGNIFICANT CHANGE UP (ref 150–400)
POTASSIUM SERPL-MCNC: 3.5 MMOL/L — SIGNIFICANT CHANGE UP (ref 3.5–5.3)
POTASSIUM SERPL-SCNC: 3.5 MMOL/L — SIGNIFICANT CHANGE UP (ref 3.5–5.3)
PROT SERPL-MCNC: 6.2 G/DL — SIGNIFICANT CHANGE UP (ref 6–8.3)
PROTHROM AB SERPL-ACNC: 14.1 SEC — HIGH (ref 9.5–13)
PTH-INTACT FLD-MCNC: 375 PG/ML — HIGH (ref 15–65)
RBC # BLD: 2.6 M/UL — LOW (ref 4.2–5.8)
RBC # FLD: 17.2 % — HIGH (ref 10.3–14.5)
SODIUM SERPL-SCNC: 133 MMOL/L — LOW (ref 135–145)
VIT D25+D1,25 OH+D1,25 PNL SERPL-MCNC: 18.5 PG/ML — LOW (ref 19.9–79.3)
WBC # BLD: 7.66 K/UL — SIGNIFICANT CHANGE UP (ref 3.8–10.5)
WBC # FLD AUTO: 7.66 K/UL — SIGNIFICANT CHANGE UP (ref 3.8–10.5)

## 2024-04-30 PROCEDURE — 99232 SBSQ HOSP IP/OBS MODERATE 35: CPT

## 2024-04-30 PROCEDURE — 99233 SBSQ HOSP IP/OBS HIGH 50: CPT | Mod: GC

## 2024-04-30 PROCEDURE — 90937 HEMODIALYSIS REPEATED EVAL: CPT

## 2024-04-30 PROCEDURE — 99233 SBSQ HOSP IP/OBS HIGH 50: CPT

## 2024-04-30 RX ORDER — DEXTROSE 10 % IN WATER 10 %
125 INTRAVENOUS SOLUTION INTRAVENOUS ONCE
Refills: 0 | Status: DISCONTINUED | OUTPATIENT
Start: 2024-04-30 | End: 2024-05-02

## 2024-04-30 RX ORDER — IRON SUCROSE 20 MG/ML
300 INJECTION, SOLUTION INTRAVENOUS
Refills: 0 | Status: COMPLETED | OUTPATIENT
Start: 2024-04-30 | End: 2024-05-02

## 2024-04-30 RX ORDER — APIXABAN 2.5 MG/1
5 TABLET, FILM COATED ORAL EVERY 12 HOURS
Refills: 0 | Status: DISCONTINUED | OUTPATIENT
Start: 2024-04-30 | End: 2024-05-02

## 2024-04-30 RX ORDER — DEXTROSE 50 % IN WATER 50 %
12.5 SYRINGE (ML) INTRAVENOUS ONCE
Refills: 0 | Status: DISCONTINUED | OUTPATIENT
Start: 2024-04-30 | End: 2024-05-02

## 2024-04-30 RX ORDER — INSULIN LISPRO 100/ML
VIAL (ML) SUBCUTANEOUS
Refills: 0 | Status: DISCONTINUED | OUTPATIENT
Start: 2024-04-30 | End: 2024-05-02

## 2024-04-30 RX ORDER — SODIUM CHLORIDE 9 MG/ML
1000 INJECTION, SOLUTION INTRAVENOUS
Refills: 0 | Status: DISCONTINUED | OUTPATIENT
Start: 2024-04-30 | End: 2024-05-02

## 2024-04-30 RX ORDER — GLUCAGON INJECTION, SOLUTION 0.5 MG/.1ML
1 INJECTION, SOLUTION SUBCUTANEOUS ONCE
Refills: 0 | Status: DISCONTINUED | OUTPATIENT
Start: 2024-04-30 | End: 2024-05-02

## 2024-04-30 RX ORDER — DEXTROSE 50 % IN WATER 50 %
25 SYRINGE (ML) INTRAVENOUS ONCE
Refills: 0 | Status: DISCONTINUED | OUTPATIENT
Start: 2024-04-30 | End: 2024-05-02

## 2024-04-30 RX ORDER — METOPROLOL TARTRATE 50 MG
12.5 TABLET ORAL EVERY 12 HOURS
Refills: 0 | Status: DISCONTINUED | OUTPATIENT
Start: 2024-04-30 | End: 2024-05-02

## 2024-04-30 RX ORDER — DEXTROSE 50 % IN WATER 50 %
15 SYRINGE (ML) INTRAVENOUS ONCE
Refills: 0 | Status: DISCONTINUED | OUTPATIENT
Start: 2024-04-30 | End: 2024-05-02

## 2024-04-30 RX ADMIN — Medication 12.5 MILLIGRAM(S): at 17:48

## 2024-04-30 RX ADMIN — CLOPIDOGREL BISULFATE 75 MILLIGRAM(S): 75 TABLET, FILM COATED ORAL at 11:43

## 2024-04-30 RX ADMIN — IRON SUCROSE 176.67 MILLIGRAM(S): 20 INJECTION, SOLUTION INTRAVENOUS at 11:57

## 2024-04-30 RX ADMIN — ATORVASTATIN CALCIUM 40 MILLIGRAM(S): 80 TABLET, FILM COATED ORAL at 22:02

## 2024-04-30 RX ADMIN — PIPERACILLIN AND TAZOBACTAM 25 GRAM(S): 4; .5 INJECTION, POWDER, LYOPHILIZED, FOR SOLUTION INTRAVENOUS at 08:51

## 2024-04-30 RX ADMIN — APIXABAN 5 MILLIGRAM(S): 2.5 TABLET, FILM COATED ORAL at 17:48

## 2024-04-30 RX ADMIN — APIXABAN 2.5 MILLIGRAM(S): 2.5 TABLET, FILM COATED ORAL at 00:43

## 2024-04-30 NOTE — PROGRESS NOTE ADULT - PROBLEM SELECTOR PLAN 6
h/o DM c/b OM. Last A1c 5.0 (03/2024), Dx at age 36 y/o, on insulin at home which he self titrates  - q6hr FS while NPO,  - ISS  - carb consistent diet post intervention h/o DM c/b OM. Last A1c 5.0 (03/2024), Dx at age 34 y/o, on insulin at home which he self titrates  - ISS  - monitor FS

## 2024-04-30 NOTE — PROGRESS NOTE ADULT - PROBLEM SELECTOR PLAN 9
H/o of obstructive CAD - possibly offered CABG prior to TAVR which was ultimately deferred. Presenting with troponemia likely iso demand ischemia with CAD and acute on chronic renal failure  [facility] 12/2021: 80-90% LPL, 70-80% OM1 and D1, 60-70% distal RCA  EKG 4/29/24: NSR with LBBB, LVH, ST-T changes due to strain  - seen by cardiology for risk stratification prior to TDC placement  - EKG stable, patient does not c/o of active chest pain

## 2024-04-30 NOTE — PROGRESS NOTE ADULT - PROBLEM SELECTOR PLAN 8
H/o of HFrEF with severe aortic stenosis, now s/p TAVR 03/2022 and still with persistent LV dysfunction.   Followed by Dr. Aldrich at [facility]. Appears to be in exacerbation overload on exam with bibasilar crackles and peripheral edema Home meds on lasix prn, coreg and eliquis.  - TTE 2/2021: Moderate LV dilatation with EF 35% with RWA, bicuspid aortic valve severe LFLG aortic stenosis (MG 50, CEDRIC 0.68), dilated ascending aorta 4.5cm  - TTE 7/2022: Normal LV size and thickness, s/p TAVR (peak velocity 2.2 m/s, DVI 0.43) with trace AR, normal RV function, dilated ascending aorta 4.8cm  - TTE 4/2024: sev reduced LVSF, LE EF 30%, grade II DD, trace AR/MR, global hypokinesis.  - Cardiology following, recs appreciated  - rest of GDMT held iso worsening CKD (losartan and SGLTi-2) and soft BP

## 2024-04-30 NOTE — PROGRESS NOTE ADULT - ASSESSMENT
69yo M with unrevascularized 3V CAD, aortic stenosis s/p TAVR March 2022 at [facility], PAD s/p angioplasty, HFrEF (EF 35-40%), Afib, CKD 5 (baseline sCr 3.9), colon cancer, DM, presents with worsening dyspnea as advised by nephrologist (Dr. Montesinos). Patient is admitted with KAT on CKD and volume overload, now s/p tunneled HD catheter placement (4/29) with initiation of urgent dialysis. Cardiology originally consulted for pre operative evaluation, now following for CV management.       Review of studies:  [facility] 12/2021: 80-90% LPL, 70-80% OM1 and D1, 60-70% distal RCA  TTE 2021: Moderate LV dilatation with EF 35% with RWA, bicuspid aortic valve severe LFLG aortic stenosis (MG 50, CEDRIC 0.68), dilated ascending aorta 4.5cm  TTE 7/2022: Normal LV size and thickness, s/p TAVR (peak velocity 2.2 m/s, DVI 0.43) with trace AR, normal RV function, dilated ascending aorta 4.8cm  EKG 4/29/24: NSR with LBBB, LVH, ST-T changes due to strain  TTE 4/29/24: dilated LV with EF 30% w global hypokinesis, grade 2 DD, normal RV, s/p TAVR (PV 1.9 m/s) w trace AR      Home medications: Eliquis 5mg BID, Plavix 75mg, Atorvastatin 40mg QD, Coreg 6.25mg BID, No longer on Losartan 25mg QD d/t CKD, SGLT2/MRA deferred d/t CKD      #3VCAD  #Troponin elevation  Patient with troponin elevation that is likely demand due to KAT and underlying CAD. Patient denies any chest pain.    - Patient has known underlying 3VCAD that is unrevascularized. Continue home plavix 75mg QD and atorvastatin 40mg QD.    - Patient follows with Dr. Aldrich (cardiologist at [facility]). Per family, patient reportedly was considered for CABG at [place] however was deemed too high risk. Will need collateral and would not recommend ischemic evaluation during this admission especially in the absence of chest pain, however patient should follow up with his outpatient cardiologist for staged PCI.      #HFrEF  Patient with known HFrEF and has persistent LV dysfunction despite TAVR. Suspect patient has ischemic cardiomyopathy.   TTE noted as above    - GDMT: would switch coreg 6.25mg BID to lopressor 12.5mg BID to allow for BP room during dialysis; remainder of GDMT on hold for now   - Volume management per HD, undergoing today with goal 2L removal. Yesterday patient experienced hypotension and only able to remove 1L.       #Afib  Patient with history of Afib, currently in NSR   - Continue Eliquis 5mg BID    #PAD s/p angioplasty   - Plavix and Statin as above     #KAT on CKD  Patient with known CKD, now here with KAT on CKD with progression to ESRD. HD started this admission. Follows with Dr. Montesinos.    - Management per renal, HD today with goal 2L removal    Case d/w attending

## 2024-04-30 NOTE — PROGRESS NOTE ADULT - PROBLEM SELECTOR PLAN 10
H/o of obstructive CAD - possibly offere CABG prior to TAVR which was ultimately deferred. Presenting with troponemia likely iso demand ischemia with CAD and acute on chronic renal failure  [facility] 12/2021: 80-90% LPL, 70-80% OM1 and D1, 60-70% distal RCA  EKG 4/29/24: NSR with LBBB, LVH, ST-T changes due to strain  - seen by cardiology for risk stratification prior to TDC placement  - EKG stable, patient does not c/o of active chest pain Hgb on admission 11.2, MCV 91.4. Currently no signs of active bleeding (no hematochezia, melena, hemoptysis, hematuria), likely 2/2 AoCD and JESSICA  - trend CBC  - maintain active T&S  - transfuse if Hgb <7   - started in IV iron supplementation during HD

## 2024-04-30 NOTE — PROGRESS NOTE ADULT - PROBLEM SELECTOR PLAN 2
known h/o CKD Stage 5, followed by Dr. Montesinos. UOP 10 cc at baseline. Now with worsening SOB and acidosis requiring HD. On admission, labs showed mixed metabolic acidosis with compensated respiratory alkalosis.   - s/p 100 mg IV lasix with minimal UOP  - nephrology following, recommendations appreciated   - underwent dialysis on 4/29 with 1.1L removed (goal was 2L but patient hypotensive)  - continue with HD as per neprology with temp femoral line  - planned for TDC with IR in AM  - started on IVFe for supplementation to be given with HD

## 2024-04-30 NOTE — PROGRESS NOTE ADULT - SUBJECTIVE AND OBJECTIVE BOX
INTERVAL EVENTS:    PAST MEDICAL & SURGICAL HISTORY:  COPD with hypoxia    Chronic kidney disease (CKD)    CAD (coronary artery disease)    Type 2 diabetes mellitus    Diabetic foot ulcers    PAD (peripheral artery disease)    Status post amputation of toe        MEDICATIONS  (STANDING):  apixaban 5 milliGRAM(s) Oral every 12 hours  atorvastatin 40 milliGRAM(s) Oral at bedtime  clopidogrel Tablet 75 milliGRAM(s) Oral daily  influenza  Vaccine (HIGH DOSE) 0.7 milliLiter(s) IntraMuscular once  iron sucrose IVPB 300 milliGRAM(s) IV Intermittent <User Schedule>  metoprolol tartrate 12.5 milliGRAM(s) Oral every 12 hours  piperacillin/tazobactam IVPB.. 4.5 Gram(s) IV Intermittent every 12 hours    MEDICATIONS  (PRN):    T(F): 98 (04-30-24 @ 10:40), Max: 98.5 (04-29-24 @ 21:25)  HR: 87 (04-30-24 @ 10:40) (74 - 88)  BP: 129/68 (04-30-24 @ 10:40) (105/51 - 132/62)  BP(mean): 64 (04-30-24 @ 10:40) (64 - 89)  ABP: --  ABP(mean): --  RR: 18 (04-30-24 @ 10:40) (18 - 20)  SpO2: 100% (04-30-24 @ 10:40) (97% - 100%)    I/O Detail 24H    29 Apr 2024 07:01  -  30 Apr 2024 07:00  --------------------------------------------------------  IN:  Total IN: 0 mL    OUT:    Other (mL): 1000 mL    Voided (mL): 100 mL  Total OUT: 1100 mL    Total NET: -1100 mL          PHYSICAL EXAM:  GEN: NAD  HEENT: EOMI   RESP: CTA b/l  CV: RRR. Normal S1/S2. No m/r/g.  ABD: soft, non-distended  EXT: No edema   NEURO: alert and attentive    LABS:  CBC 04-30-24 @ 05:30                        7.2    7.66  )-----------( 237                   22.9       Hgb trend: 7.2 <-- , 9.0 <-- , 7.5 <--   WBC trend: 7.66 <-- , 11.29 <-- , 10.04 <--       CMP 04-30-24 @ 05:30    133<L>  |  102  |  58<H>  ----------------------------<  123<H>  3.5   |  16<L>  |  6.78<H>    Ca    8.4      04-30-24 @ 05:30  Phos  6.4     04-30  Mg     1.9     04-30    TPro  6.2  /  Alb  3.3  /  TBili  0.3  /  DBili  x   /  AST  14  /  ALT  9<L>  /  AlkPhos  62     04-30      Serum Cr trend: 6.78 <-- , 7.90 <-- , 7.84 <--     PT/INR - ( 30 Apr 2024 05:30 )   PT: 14.1 sec;   INR: 1.24          PTT - ( 30 Apr 2024 05:30 ):38.0 sec    Cardiac Markers   04-29-24 @ 08:50: HS Trop T 268<HH> / CK 61    / CKMB 4.1    04-29-24 @ 00:29: HS Trop T 209<HH> / CK x     / CKMB x              STUDIES:           INTERVAL EVENTS: patient symptomatically feeling better     PAST MEDICAL & SURGICAL HISTORY:  COPD with hypoxia    Chronic kidney disease (CKD)    CAD (coronary artery disease)    Type 2 diabetes mellitus    Diabetic foot ulcers    PAD (peripheral artery disease)    Status post amputation of toe        MEDICATIONS  (STANDING):  apixaban 5 milliGRAM(s) Oral every 12 hours  atorvastatin 40 milliGRAM(s) Oral at bedtime  clopidogrel Tablet 75 milliGRAM(s) Oral daily  influenza  Vaccine (HIGH DOSE) 0.7 milliLiter(s) IntraMuscular once  iron sucrose IVPB 300 milliGRAM(s) IV Intermittent <User Schedule>  metoprolol tartrate 12.5 milliGRAM(s) Oral every 12 hours  piperacillin/tazobactam IVPB.. 4.5 Gram(s) IV Intermittent every 12 hours    MEDICATIONS  (PRN):    T(F): 98 (04-30-24 @ 10:40), Max: 98.5 (04-29-24 @ 21:25)  HR: 87 (04-30-24 @ 10:40) (74 - 88)  BP: 129/68 (04-30-24 @ 10:40) (105/51 - 132/62)  BP(mean): 64 (04-30-24 @ 10:40) (64 - 89)  ABP: --  ABP(mean): --  RR: 18 (04-30-24 @ 10:40) (18 - 20)  SpO2: 100% (04-30-24 @ 10:40) (97% - 100%)    I/O Detail 24H    29 Apr 2024 07:01  -  30 Apr 2024 07:00  --------------------------------------------------------  IN:  Total IN: 0 mL    OUT:    Other (mL): 1000 mL    Voided (mL): 100 mL  Total OUT: 1100 mL    Total NET: -1100 mL          PHYSICAL EXAM:  GEN: Awake, on BIPAP uncomfortable   HEENT: NCAT, PERRL, EOMI. Mucosa moist.   RESP: bilateral crackles   CV: RRR, normal s1/s2. No m/r/g.  ABD: Soft, NTND. BS+  EXT: Warm. No edema, clubbing, or cyanosis.   NEURO: AAOx3. No focal deficits.    LABS:  CBC 04-30-24 @ 05:30                        7.2    7.66  )-----------( 237                   22.9       Hgb trend: 7.2 <-- , 9.0 <-- , 7.5 <--   WBC trend: 7.66 <-- , 11.29 <-- , 10.04 <--       CMP 04-30-24 @ 05:30    133<L>  |  102  |  58<H>  ----------------------------<  123<H>  3.5   |  16<L>  |  6.78<H>    Ca    8.4      04-30-24 @ 05:30  Phos  6.4     04-30  Mg     1.9     04-30    TPro  6.2  /  Alb  3.3  /  TBili  0.3  /  DBili  x   /  AST  14  /  ALT  9<L>  /  AlkPhos  62     04-30      Serum Cr trend: 6.78 <-- , 7.90 <-- , 7.84 <--     PT/INR - ( 30 Apr 2024 05:30 )   PT: 14.1 sec;   INR: 1.24          PTT - ( 30 Apr 2024 05:30 ):38.0 sec    Cardiac Markers   04-29-24 @ 08:50: HS Trop T 268<HH> / CK 61    / CKMB 4.1    04-29-24 @ 00:29: HS Trop T 209<HH> / CK x     / CKMB x              STUDIES:

## 2024-04-30 NOTE — PROGRESS NOTE ADULT - PROBLEM SELECTOR PLAN 3
Recently admitted to [facility] and found to have OM of R foot +pseudomonal, d/c'ed on IV zosyn through 05/10   - repeat XR foot with 3 views which showed no tracking gas collections or gross radiographic evidence for OM  - c/w zosyn until 5/10  - seen by podiatry, wound dressing changed

## 2024-04-30 NOTE — PROGRESS NOTE ADULT - ASSESSMENT
69 yo M w/ new start ESRD, tolerated dialysis adequately, reduced UF goal from 2L to 1L due to symptomatic hypotension. This morning not short of breath, subjectively feeling better already after one treatment. Patient now receiving second HD treatment with goal UF 2L.    ESRD on HD - started 4/29  - Second HD today: 2.5 hr, 3K bath, 2L UF goal  - Follow up quantiferon  - Follow up PTH, vit D  - Renal diet, fluid restriction <1.5L/day  - Strict I&O, daily standing weights  - Social work consult to arrange outpatient HD  - Management of respiratory failure per primary team  - GDMT per Cardiology    Anemia  - Hgb below goal  - Tsat 9%, add IV iron 300mg x3  - Add AGUS after iron loading

## 2024-04-30 NOTE — PROGRESS NOTE ADULT - SUBJECTIVE AND OBJECTIVE BOX
Patient is a 68y old  Male who presents with a chief complaint of Dyspnea (29 Apr 2024 13:46)      INTERVAL HPI/ OVERNIGHT EVENTS: Pt seen and evaluated by podiatry team with attending this AM. Pts dressing is C/D/I. pt still complaining of pain to amputation site.       LABS                        7.2    7.66  )-----------( 237      ( 30 Apr 2024 05:30 )             22.9     04-30    133<L>  |  102  |  58<H>  ----------------------------<  123<H>  3.5   |  16<L>  |  6.78<H>    Ca    8.4      30 Apr 2024 05:30  Phos  6.4     04-30  Mg     1.9     04-30    TPro  6.2  /  Alb  3.3  /  TBili  0.3  /  DBili  x   /  AST  14  /  ALT  9<L>  /  AlkPhos  62  04-30    PT/INR - ( 30 Apr 2024 05:30 )   PT: 14.1 sec;   INR: 1.24          PTT - ( 30 Apr 2024 05:30 )  PTT:38.0 sec  ESR: 99  CRP: --  04-30 @ 05:30    ICU Vital Signs Last 24 Hrs  T(C): 36.7 (30 Apr 2024 09:50), Max: 36.9 (29 Apr 2024 21:25)  T(F): 98.1 (30 Apr 2024 09:50), Max: 98.5 (29 Apr 2024 21:25)  HR: 76 (30 Apr 2024 09:48) (74 - 88)  BP: 111/58 (30 Apr 2024 08:50) (105/51 - 132/62)  BP(mean): 81 (30 Apr 2024 08:50) (66 - 89)  ABP: --  ABP(mean): --  RR: 18 (30 Apr 2024 08:50) (18 - 20)  SpO2: 100% (30 Apr 2024 09:48) (97% - 100%)    O2 Parameters below as of 30 Apr 2024 08:50  Patient On (Oxygen Delivery Method): nasal cannula  O2 Flow (L/min): 6        RADIOLOGY:   < from: Xray Foot AP + Lateral + Oblique, Right (04.29.24 @ 10:54) >  IMPRESSION:  Redemonstrated partial 5th ray amputation defect through mid metatarsal   shaft. Sharp smooth corticated appearing stump margin with preserved   overlying soft tissue coverage. No tracking gas collections or gross   radiographic evidence for osteomyelitis.    Remainder of foot unchanged including thick protuberant plantar and   posterior calcaneal enthesophytes, hammertoe deformities, generalized   osteopenia, and vascular calcifications.    --- End of Report ---    < end of copied text >      PHYSICAL EXAM  Right Lower Extremity Focused:  Vasc: PT/DP non-palpable - biphasic DP, monophasic PT, no edema or erythema  Derm: Surgical site with fibrotic eschar - no active drainage, no malodor   Neuro: protective sensation in tact  MSK: s/p 2-5 ray amputations on Left, s/p partial 5th ray amputation Right

## 2024-04-30 NOTE — PROGRESS NOTE ADULT - PROBLEM SELECTOR PLAN 3
known h/o CKD Stage 5, followed by Dr. Montesinos. UOP 10 cc at baseline. Now with worsening SOB and acidosis requiring HD. On admission, labs showed mixed metabolic acidosis with compensated respiratory alkalosis.   - nephrology following, recommendations appreciated   - planned to initiate HD on TDC placed with IR  - s/p 100 mg IV lasix with minimal UOP Recently admitted to [facility] and found to have OM of R foot +pseudomonal, d/c'ed on IV zosyn through 05/10   - repeat XR foot with 3 views which showed no tracking gas collections or gross radiographic evidence for OM  - c/w zosyn until 5/10  - seen by podiatry, wound dressing changed

## 2024-04-30 NOTE — PROGRESS NOTE ADULT - PROBLEM SELECTOR PLAN 5
h/o of chronic AF rate controlled. At home on eliqiuis 5mg BID, coreg 6.25  - cardiology following   - restarted on eliquis 5 mg q12 for A/C  - changed coreg to lopressor 12.5 q12 for BP control during HD

## 2024-04-30 NOTE — PROGRESS NOTE ADULT - PROBLEM SELECTOR PLAN 6
h/o DM c/b OM. Last A1c 5.0 (03/2024), Dx at age 36 y/o, on insulin at home which he self titrates  - ISS  - monitor FS

## 2024-04-30 NOTE — PROGRESS NOTE ADULT - PROBLEM SELECTOR PLAN 8
Hgb on admission 11.2, MCV 91.4. Currently no signs of active bleeding (no hematochezia, melena, hemoptysis, hematuria), likely 2/2 AoCD and JESSICA  - trend CBC  - maintain active T&S  - transfuse if Hgb <7   - holding iron supplementation with treated for OM H/o of HFrEF with severe aortic stenosis, now s/p TAVR 03/2022 and still with persistent LV dysfunction.   Followed by Dr. Aldrich at [facility]. Appears to be in exacerbation overload on exam with bibasilar crackles and peripheral edema Home meds on lasix prn, coreg and eliquis.  - TTE 2/2021: Moderate LV dilatation with EF 35% with RWA, bicuspid aortic valve severe LFLG aortic stenosis (MG 50, CEDRIC 0.68), dilated ascending aorta 4.5cm  - TTE 7/2022: Normal LV size and thickness, s/p TAVR (peak velocity 2.2 m/s, DVI 0.43) with trace AR, normal RV function, dilated ascending aorta 4.8cm  - TTE 4/2024: sev reduced LVSF, LE EF 30%, grade II DD, trace AR/MR, global hypokinesis.  - Cardiology following, recs appreciated  - rest of GDMT held iso worsening CKD (losartan and SGLTi-2) and soft BP

## 2024-04-30 NOTE — PROGRESS NOTE ADULT - PROBLEM SELECTOR PLAN 7
h/o severe PAD, s/p R LE angioplasty in 1/2024 with balloon angioplasty in proximal AT and below the knee popliteal with 1 conventional and 1 drug eluting stent. At home on plavix 75 mg qd  - plavix restarted

## 2024-04-30 NOTE — PROGRESS NOTE ADULT - SUBJECTIVE AND OBJECTIVE BOX
Nephrology progress note    Seen at bedside on HD. Could not lie flat yesterday comfortably, could not place TDC. Femoral HD cath placed. Tolerated 1L UF off below goal 2L UF yesterday due to symptomatic hypotension. Patient states he feels better today after first HD.    Today SBP 100s, tolerating treatment with net UF goal 2L. Continue HD as prescribed.    Allergies:  vancomycin (Unknown)    Hospital Medications:   MEDICATIONS  (STANDING):  apixaban 5 milliGRAM(s) Oral every 12 hours  atorvastatin 40 milliGRAM(s) Oral at bedtime  carvedilol 6.25 milliGRAM(s) Oral every 12 hours  clopidogrel Tablet 75 milliGRAM(s) Oral daily  influenza  Vaccine (HIGH DOSE) 0.7 milliLiter(s) IntraMuscular once  piperacillin/tazobactam IVPB.. 4.5 Gram(s) IV Intermittent every 12 hours    REVIEW OF SYSTEMS:  All other review of systems is negative unless indicated above.    VITALS:  T(F): 98.1 (04-30-24 @ 09:50), Max: 98.5 (04-29-24 @ 21:25)  HR: 76 (04-30-24 @ 09:48)  BP: 111/58 (04-30-24 @ 08:50)  RR: 18 (04-30-24 @ 08:50)  SpO2: 100% (04-30-24 @ 09:48)  Wt(kg): --    04-28 @ 07:01  -  04-29 @ 07:00  --------------------------------------------------------  IN: 38 mL / OUT: 0 mL / NET: 38 mL    04-29 @ 07:01  -  04-30 @ 07:00  --------------------------------------------------------  IN: 0 mL / OUT: 1100 mL / NET: -1100 mL        PHYSICAL EXAM:  Constitutional: NAD, lying in bed on HD  Respiratory: Diminished breath sounds, no w/r/r  Cardiovascular: Regular rate  Extremities: 1+ LE edema  Neurological: Awake, alert, answering questions appropriately  Vascular Access: L femoral HD cath accessed    LABS:  04-30    133<L>  |  102  |  58<H>  ----------------------------<  123<H>  3.5   |  16<L>  |  6.78<H>    Ca    8.4      30 Apr 2024 05:30  Phos  6.4     04-30  Mg     1.9     04-30    TPro  6.2  /  Alb  3.3  /  TBili  0.3  /  DBili      /  AST  14  /  ALT  9<L>  /  AlkPhos  62  04-30                          7.2    7.66  )-----------( 237      ( 30 Apr 2024 05:30 )             22.9       Urine Studies:  Creatinine Trend: 6.78<--, 7.90<--, 7.84<--, 4.27<--, 4.32<--, 4.34<--  Urinalysis Basic - ( 30 Apr 2024 05:30 )    Color:  / Appearance:  / SG:  / pH:   Gluc: 123 mg/dL / Ketone:   / Bili:  / Urobili:    Blood:  / Protein:  / Nitrite:    Leuk Esterase:  / RBC:  / WBC    Sq Epi:  / Non Sq Epi:  / Bacteria:       Sodium, Random Urine: 20 mmol/L (04-29 @ 07:11)  Creatinine, Random Urine: 165 mg/dL (04-29 @ 07:11)  Protein/Creatinine Ratio Calculation: 0.2 Ratio (04-29 @ 07:11)  Osmolality, Random Urine: 288 mosm/kg (04-29 @ 07:11)  Potassium, Random Urine: 31 mmol/L (04-29 @ 07:11)    RADIOLOGY & ADDITIONAL STUDIES:  reviewed

## 2024-04-30 NOTE — PROGRESS NOTE ADULT - PROBLEM SELECTOR PLAN 5
h/o of chronic AF rate controlled. At home on eliqiuis 5mg BID, coreg 6.25  - heparin gtt while pending IR  - plan to restart eliquis s/p TDC  - c/w home coreg h/o of chronic AF rate controlled. At home on eliqiuis 5mg BID, coreg 6.25  - cardiology following   - restarted on eliquis 5 mg q12 for A/C  - changed coreg to lopressor 12.5 q12 for BP control during HD

## 2024-04-30 NOTE — PROGRESS NOTE ADULT - PROBLEM SELECTOR PLAN 7
h/o severe PAD, s/p R LE angioplasty in 1/2024 with balloon angioplasty in proximal AT and below the knee popliteal with 1 conventional and 1 drug eluting stent. At home on plavix 75 mg qd  - hold plavix prior to planned IR procedure  - restart once cleared h/o severe PAD, s/p R LE angioplasty in 1/2024 with balloon angioplasty in proximal AT and below the knee popliteal with 1 conventional and 1 drug eluting stent. At home on plavix 75 mg qd  - plavix restarted

## 2024-04-30 NOTE — PROGRESS NOTE ADULT - PROBLEM SELECTOR PLAN 12
h/o Colon CA s/p ostomy placement, stable   - continue to monitor ostomy output      Electrolytes: cautious iso ESRD  Nutrition: NPO at MN with transition to renal diet s/p procedure  DVT: heparin   Code status: Full   Disposition: telemetry -> RMF

## 2024-04-30 NOTE — PROGRESS NOTE ADULT - SUBJECTIVE AND OBJECTIVE BOX
OVERNIGHT EVENTS:    SUBJECTIVE / INTERVAL HPI: Patient seen and examined at bedside.     VITAL SIGNS:  Vital Signs Last 24 Hrs  T(C): 36.6 (30 Apr 2024 14:00), Max: 36.9 (29 Apr 2024 21:25)  T(F): 97.9 (30 Apr 2024 14:00), Max: 98.5 (29 Apr 2024 21:25)  HR: 79 (30 Apr 2024 13:40) (74 - 88)  BP: 120/57 (30 Apr 2024 13:40) (105/51 - 132/62)  BP(mean): 82 (30 Apr 2024 13:40) (64 - 89)  RR: 18 (30 Apr 2024 13:40) (18 - 20)  SpO2: 100% (30 Apr 2024 13:40) (97% - 100%)    Parameters below as of 30 Apr 2024 13:40  Patient On (Oxygen Delivery Method): room air        PHYSICAL EXAM:    General: alert, in no acute distress  HEENT: NC/AT; PERRL, anicteric sclera; MMM  Neck: supple  Cardiovascular: +S1/S2, RRR  Respiratory: CTA B/L; no W/R/R  Gastrointestinal: soft, NT/ND; +BSx4  Extremities: WWP; no edema, clubbing or cyanosis  Vascular: 2+ radial, DP/PT pulses B/L  Neurological: AAOx3; no focal deficits    MEDICATIONS:  MEDICATIONS  (STANDING):  apixaban 5 milliGRAM(s) Oral every 12 hours  atorvastatin 40 milliGRAM(s) Oral at bedtime  clopidogrel Tablet 75 milliGRAM(s) Oral daily  influenza  Vaccine (HIGH DOSE) 0.7 milliLiter(s) IntraMuscular once  iron sucrose IVPB 300 milliGRAM(s) IV Intermittent <User Schedule>  metoprolol tartrate 12.5 milliGRAM(s) Oral every 12 hours  piperacillin/tazobactam IVPB.. 4.5 Gram(s) IV Intermittent every 12 hours    MEDICATIONS  (PRN):      ALLERGIES:  Allergies    vancomycin (Unknown)    Intolerances        LABS:                        7.2    7.66  )-----------( 237      ( 30 Apr 2024 05:30 )             22.9     04-30    133<L>  |  102  |  58<H>  ----------------------------<  123<H>  3.5   |  16<L>  |  6.78<H>    Ca    8.4      30 Apr 2024 05:30  Phos  6.4     04-30  Mg     1.9     04-30    TPro  6.2  /  Alb  3.3  /  TBili  0.3  /  DBili  x   /  AST  14  /  ALT  9<L>  /  AlkPhos  62  04-30    PT/INR - ( 30 Apr 2024 05:30 )   PT: 14.1 sec;   INR: 1.24          PTT - ( 30 Apr 2024 05:30 )  PTT:38.0 sec  Urinalysis Basic - ( 30 Apr 2024 05:30 )    Color: x / Appearance: x / SG: x / pH: x  Gluc: 123 mg/dL / Ketone: x  / Bili: x / Urobili: x   Blood: x / Protein: x / Nitrite: x   Leuk Esterase: x / RBC: x / WBC x   Sq Epi: x / Non Sq Epi: x / Bacteria: x      CAPILLARY BLOOD GLUCOSE          RADIOLOGY & ADDITIONAL TESTS: Reviewed. Hospital Course:    69 y/o M w/ PMHx of unrevascularized 3V-CAD (RCA, Lcx, LAD), aortic stenosis s/p TAVR 3/2022, PAD s/p angioplasty 1/2023, HFrEF (EF 35-40%), Afib on Eliquis, AAA, CKD 5 (baseline sCr 3.9, followed by Dr. Sadler), colon cancer s/p colostomy, IDDM2 c/b OM s/p b/l amputations, with recent admission to [facility] for OM of R foot who presents with worsening dyspnea and oliguria as ), admitted for urgent initiation of HD. Found to have AHRF, mixed metabolic acidosis and type II MI who underwent temporary femoral access - now s/p HD x2 with net -2.5L fluid removal with symptomatic improvement. Planned for TDC placement with IR and additional HD over the next few days, now HDS for step-down for further management.     OVERNIGHT EVENTS: MARIPOSA    SUBJECTIVE / INTERVAL HPI: Patient seen and examined at bedside. States his SOB has improved, requiring less time with is BiPAP. Still with minimal UOP and orthopnea.     VITAL SIGNS:  Vital Signs Last 24 Hrs  T(C): 36.6 (30 Apr 2024 14:00), Max: 36.9 (29 Apr 2024 21:25)  T(F): 97.9 (30 Apr 2024 14:00), Max: 98.5 (29 Apr 2024 21:25)  HR: 79 (30 Apr 2024 13:40) (74 - 88)  BP: 120/57 (30 Apr 2024 13:40) (105/51 - 132/62)  BP(mean): 82 (30 Apr 2024 13:40) (64 - 89)  RR: 18 (30 Apr 2024 13:40) (18 - 20)  SpO2: 100% (30 Apr 2024 13:40) (97% - 100%)    Parameters below as of 30 Apr 2024 13:40  Patient On (Oxygen Delivery Method): room air        PHYSICAL EXAM:    General: alert, in no acute distress, +BiPAP with towel over his head  HEENT: NC/AT; PERRL, anicteric sclera; poor dentition   Neck: supple, wide circumference   Cardiovascular: +S1/S2, RRR  Respiratory: bibasilar crackles  Gastrointestinal: obese, soft, NT/ND; +BSx4, +ostomy   Extremities: WWP; 2+ LE edema to the knee, R foot wrapped and with dressing  Neurological: AAOx3; no focal deficits    MEDICATIONS:  MEDICATIONS  (STANDING):  apixaban 5 milliGRAM(s) Oral every 12 hours  atorvastatin 40 milliGRAM(s) Oral at bedtime  clopidogrel Tablet 75 milliGRAM(s) Oral daily  influenza  Vaccine (HIGH DOSE) 0.7 milliLiter(s) IntraMuscular once  iron sucrose IVPB 300 milliGRAM(s) IV Intermittent <User Schedule>  metoprolol tartrate 12.5 milliGRAM(s) Oral every 12 hours  piperacillin/tazobactam IVPB.. 4.5 Gram(s) IV Intermittent every 12 hours    MEDICATIONS  (PRN):      ALLERGIES:  Allergies    vancomycin (Unknown)    Intolerances        LABS:                        7.2    7.66  )-----------( 237      ( 30 Apr 2024 05:30 )             22.9     04-30    133<L>  |  102  |  58<H>  ----------------------------<  123<H>  3.5   |  16<L>  |  6.78<H>    Ca    8.4      30 Apr 2024 05:30  Phos  6.4     04-30  Mg     1.9     04-30    TPro  6.2  /  Alb  3.3  /  TBili  0.3  /  DBili  x   /  AST  14  /  ALT  9<L>  /  AlkPhos  62  04-30    PT/INR - ( 30 Apr 2024 05:30 )   PT: 14.1 sec;   INR: 1.24          PTT - ( 30 Apr 2024 05:30 )  PTT:38.0 sec  Urinalysis Basic - ( 30 Apr 2024 05:30 )    Color: x / Appearance: x / SG: x / pH: x  Gluc: 123 mg/dL / Ketone: x  / Bili: x / Urobili: x   Blood: x / Protein: x / Nitrite: x   Leuk Esterase: x / RBC: x / WBC x   Sq Epi: x / Non Sq Epi: x / Bacteria: x      CAPILLARY BLOOD GLUCOSE          RADIOLOGY & ADDITIONAL TESTS: Reviewed.

## 2024-04-30 NOTE — PROGRESS NOTE ADULT - SUBJECTIVE AND OBJECTIVE BOX
Patient was seen and evaluated again while on dialysis.   HR: 74 (04-30-24 @ 12:00)  BP: 120/60 (04-30-24 @ 12:00)    133<L>  |  102  |  58<H>  ----------------------------<  123<H>  3.5   |  16<L>  |  6.78<H>    Ca    8.4      30 Apr 2024 05:30  Phos  6.4     04-30  Mg     1.9     04-30    TPro  6.2  /  Alb  3.3  /  TBili  0.3  /  DBili  x   /  AST  14  /  ALT  9<L>  /  AlkPhos  62  04-30    Continue dialysis:   Dialyzer:       F160  QB:  250 ml/min  K bath:  3K  Goal UF: 2L  Duration: 2.5  have added IVFe since came to floor late will extend Rx time to 3H  Patient is tolerating the procedure well.

## 2024-04-30 NOTE — PROGRESS NOTE ADULT - PROBLEM SELECTOR PLAN 9
H/o of HFrEF with severe aortic stenosis, now s/p TAVR 03/2022 and still with persistent LV dysfunction. Followed by Dr. Aldrich at [facility].  TTE 2021: Moderate LV dilatation with EF 35% with RWA, bicuspid aortic valve severe LFLG aortic stenosis (MG 50, CEDRIC 0.68), dilated ascending aorta 4.5cm  TTE 7/2022: Normal LV size and thickness, s/p TAVR (peak velocity 2.2 m/s, DVI 0.43) with trace AR, normal RV function, dilated ascending aorta 4.8cm  - home meds on lasix prn, coreg and eliquis  - appears to be in exacerbation overload on exam with bibasilar crackles and peripheral edema  - GDMT held iso worsening CKD (losartan and SGLTi-2)  - f/u repeat TTE H/o of obstructive CAD - possibly offered CABG prior to TAVR which was ultimately deferred. Presenting with troponemia likely iso demand ischemia with CAD and acute on chronic renal failure  [facility] 12/2021: 80-90% LPL, 70-80% OM1 and D1, 60-70% distal RCA  EKG 4/29/24: NSR with LBBB, LVH, ST-T changes due to strain  - seen by cardiology for risk stratification prior to TDC placement  - EKG stable, patient does not c/o of active chest pain

## 2024-04-30 NOTE — PROGRESS NOTE ADULT - PROBLEM SELECTOR PLAN 1
Patient with worsening SOB over the last weeks to months, likely multifactorial iso volume overload 2/2 CHF (off GDMT) vs acute on chronic renal failure vs obstructive CAD vs COPD/ZAYDA vs obesity vs R-sided vs diaphragmatic elevation. Initially presenting on NRB (home O2 NC2-4L). Additionally with worsening respiratory alkalosis compensating for mixed metabolic acidosis 2/2 ARF.   - BNP>7000  - CXR w/ vascular congestion bilaterally.   - intermittently asking for BiPAP with NC on, although no hypoxia. Remains at % on 4L NC  - s/p 100 mg IV lasix x1 Patient with worsening SOB over the last weeks to months, likely multifactorial iso volume overload 2/2 CHF (off GDMT) vs acute on chronic renal failure vs obstructive CAD vs COPD/ZAYDA vs obesity vs R-sided vs diaphragmatic elevation. Initially presenting on NRB (home O2 NC2-4L). Additionally with worsening respiratory alkalosis compensating for mixed metabolic acidosis 2/2 ARF.   - BNP>7000  - CXR w/ vascular congestion bilaterally  - s/p 100 mg IV lasix x1 with no change in UOP  - intermittently asking for BiPAP with NC on, although no hypoxia. Remains at % on 4L NC

## 2024-04-30 NOTE — PROGRESS NOTE ADULT - PROBLEM SELECTOR PLAN 4
Recently admitted to [facility] and found to have OM of R foot +pseudomonal, d/c'ed on IV zosyn through 05/10   - c/w zosyn until 5/10  - seen by podiatry, wound dressing changed  - repeat XR foot with 3 views which showed no tracking gas collections or gross radiographic evidence for OM h/o COPD/ZAYDA (on 2-4 L NC at home) and BiPAP at home   - no wheezing on exam but with cough  - does not appear to be in exacerbation   - CPAP at night

## 2024-04-30 NOTE — PROGRESS NOTE ADULT - PROBLEM SELECTOR PLAN 12
h/o Colon CA s/p ostomy placement, stable   - continue to monitor ostomy output      Electrolytes: cautious iso ESRD  Nutrition: NPO with transition to renal diet s/p procedure  DVT: heparin   Code status: Full   Disposition: telemetry h/o Colon CA s/p ostomy placement, stable   - continue to monitor ostomy output      Electrolytes: cautious iso ESRD  Nutrition: NPO at MN with transition to renal diet s/p procedure  DVT: heparin   Code status: Full   Disposition: telemetry -> [facility]

## 2024-04-30 NOTE — PROGRESS NOTE ADULT - PROBLEM SELECTOR PLAN 10
Hgb on admission 11.2, MCV 91.4. Currently no signs of active bleeding (no hematochezia, melena, hemoptysis, hematuria), likely 2/2 AoCD and JESSICA  - trend CBC  - maintain active T&S  - transfuse if Hgb <7   - started in IV iron supplementation during HD

## 2024-04-30 NOTE — PROGRESS NOTE ADULT - PROBLEM SELECTOR PLAN 1
Patient with worsening SOB over the last weeks to months, likely multifactorial iso volume overload 2/2 CHF (off GDMT) vs acute on chronic renal failure vs obstructive CAD vs COPD/ZAYDA vs obesity vs R-sided vs diaphragmatic elevation. Initially presenting on NRB (home O2 NC2-4L). Additionally with worsening respiratory alkalosis compensating for mixed metabolic acidosis 2/2 ARF.   - BNP>7000  - CXR w/ vascular congestion bilaterally  - s/p 100 mg IV lasix x1 with no change in UOP  - intermittently asking for BiPAP with NC on, although no hypoxia. Remains at % on 4L NC

## 2024-04-30 NOTE — PROGRESS NOTE ADULT - SUBJECTIVE AND OBJECTIVE BOX
***STEPDOWN from 7[facility] to F*******    HOSPITAL COURSE:   69 y/o M w/ PMHx of unrevascularized 3V-CAD (RCA, Lcx, LAD), aortic stenosis s/p TAVR 3/2022, PAD s/p angioplasty 1/2023, HFrEF (EF 35-40%), Afib on Eliquis, AAA, CKD 5 (baseline sCr 3.9, followed by Dr. Sadler), colon cancer s/p colostomy, IDDM2 c/b OM s/p b/l amputations, with recent admission to [facility] for OM of R foot who presents with worsening dyspnea and oliguria as ), admitted for urgent initiation of HD. Found to have AHRF, mixed metabolic acidosis and type II MI who underwent temporary femoral access - now s/p HD x2 with net -2.5L fluid removal with symptomatic improvement. Planned for TDC placement with IR and additional HD over the next few days, now HDS for step-down for further management.     SUBJECTIVE / INTERVAL HPI: Patient seen and examined at bedside. States his SOB has improved, requiring less time with is BiPAP. Still with minimal UOP and orthopnea.       VITAL SIGNS:  T(F): 97.9 (04-30-24 @ 14:00)  HR: 70 (04-30-24 @ 15:14)  BP: 118/58 (04-30-24 @ 15:14)  RR: 18 (04-30-24 @ 15:14)  SpO2: 100% (04-30-24 @ 15:14)  Wt(kg): --      04-29-24 @ 07:01  -  04-30-24 @ 07:00  --------------------------------------------------------  IN: 0 mL / OUT: 1100 mL / NET: -1100 mL    04-30-24 @ 07:01  -  04-30-24 @ 15:44  --------------------------------------------------------  IN: 0 mL / OUT: 1500 mL / NET: -1500 mL        PHYSICAL EXAM:    General: alert, in no acute distress, +BiPAP with towel over his head  HEENT: NC/AT; PERRL, anicteric sclera; poor dentition   Neck: supple, wide circumference   Cardiovascular: +S1/S2, RRR  Respiratory: bibasilar crackles  Gastrointestinal: obese, soft, NT/ND; +BSx4, +ostomy   Extremities: WWP; 2+ LE edema to the knee, R foot wrapped and with dressing  Neurological: AAOx3; no focal deficits    MEDICATIONS  (STANDING):  apixaban 5 milliGRAM(s) Oral every 12 hours  atorvastatin 40 milliGRAM(s) Oral at bedtime  clopidogrel Tablet 75 milliGRAM(s) Oral daily  influenza  Vaccine (HIGH DOSE) 0.7 milliLiter(s) IntraMuscular once  iron sucrose IVPB 300 milliGRAM(s) IV Intermittent <User Schedule>  metoprolol tartrate 12.5 milliGRAM(s) Oral every 12 hours  piperacillin/tazobactam IVPB.. 4.5 Gram(s) IV Intermittent every 12 hours    MEDICATIONS  (PRN):      Allergies    vancomycin (Unknown)    Intolerances        LABS:                        7.2    7.66  )-----------( 237      ( 30 Apr 2024 05:30 )             22.9     04-30    133<L>  |  102  |  58<H>  ----------------------------<  123<H>  3.5   |  16<L>  |  6.78<H>    Ca    8.4      30 Apr 2024 05:30  Phos  6.4     04-30  Mg     1.9     04-30    TPro  6.2  /  Alb  3.3  /  TBili  0.3  /  DBili  x   /  AST  14  /  ALT  9<L>  /  AlkPhos  62  04-30    PT/INR - ( 30 Apr 2024 05:30 )   PT: 14.1 sec;   INR: 1.24          PTT - ( 30 Apr 2024 05:30 )  PTT:38.0 sec  Urinalysis Basic - ( 30 Apr 2024 05:30 )    Color: x / Appearance: x / SG: x / pH: x  Gluc: 123 mg/dL / Ketone: x  / Bili: x / Urobili: x   Blood: x / Protein: x / Nitrite: x   Leuk Esterase: x / RBC: x / WBC x   Sq Epi: x / Non Sq Epi: x / Bacteria: x        RADIOLOGY & ADDITIONAL TESTS:  Reviewed

## 2024-05-01 ENCOUNTER — TRANSCRIPTION ENCOUNTER (OUTPATIENT)
Age: 69
End: 2024-05-01

## 2024-05-01 ENCOUNTER — LABORATORY RESULT (OUTPATIENT)
Age: 69
End: 2024-05-01

## 2024-05-01 LAB
ALBUMIN SERPL ELPH-MCNC: 3.1 G/DL — LOW (ref 3.3–5)
ALP SERPL-CCNC: 66 U/L — SIGNIFICANT CHANGE UP (ref 40–120)
ALT FLD-CCNC: 7 U/L — LOW (ref 10–45)
ANION GAP SERPL CALC-SCNC: 15 MMOL/L — SIGNIFICANT CHANGE UP (ref 5–17)
ANISOCYTOSIS BLD QL: SLIGHT — SIGNIFICANT CHANGE UP
AST SERPL-CCNC: 15 U/L — SIGNIFICANT CHANGE UP (ref 10–40)
BASOPHILS # BLD AUTO: 0.07 K/UL — SIGNIFICANT CHANGE UP (ref 0–0.2)
BASOPHILS NFR BLD AUTO: 0.9 % — SIGNIFICANT CHANGE UP (ref 0–2)
BILIRUB SERPL-MCNC: 0.3 MG/DL — SIGNIFICANT CHANGE UP (ref 0.2–1.2)
BUN SERPL-MCNC: 49 MG/DL — HIGH (ref 7–23)
BURR CELLS BLD QL SMEAR: PRESENT — SIGNIFICANT CHANGE UP
CALCIUM SERPL-MCNC: 8.5 MG/DL — SIGNIFICANT CHANGE UP (ref 8.4–10.5)
CHLORIDE SERPL-SCNC: 100 MMOL/L — SIGNIFICANT CHANGE UP (ref 96–108)
CO2 SERPL-SCNC: 17 MMOL/L — LOW (ref 22–31)
CREAT SERPL-MCNC: 6.02 MG/DL — HIGH (ref 0.5–1.3)
DACRYOCYTES BLD QL SMEAR: SLIGHT — SIGNIFICANT CHANGE UP
EGFR: 9 ML/MIN/1.73M2 — LOW
EOSINOPHIL # BLD AUTO: 0.35 K/UL — SIGNIFICANT CHANGE UP (ref 0–0.5)
EOSINOPHIL NFR BLD AUTO: 4.3 % — SIGNIFICANT CHANGE UP (ref 0–6)
GIANT PLATELETS BLD QL SMEAR: PRESENT — SIGNIFICANT CHANGE UP
GLUCOSE BLDC GLUCOMTR-MCNC: 105 MG/DL — HIGH (ref 70–99)
GLUCOSE BLDC GLUCOMTR-MCNC: 136 MG/DL — HIGH (ref 70–99)
GLUCOSE BLDC GLUCOMTR-MCNC: 153 MG/DL — HIGH (ref 70–99)
GLUCOSE BLDC GLUCOMTR-MCNC: 89 MG/DL — SIGNIFICANT CHANGE UP (ref 70–99)
GLUCOSE SERPL-MCNC: 98 MG/DL — SIGNIFICANT CHANGE UP (ref 70–99)
HBV CORE AB SER-ACNC: SIGNIFICANT CHANGE UP
HCT VFR BLD CALC: 24.8 % — LOW (ref 39–50)
HGB BLD-MCNC: 7.5 G/DL — LOW (ref 13–17)
HYPOCHROMIA BLD QL: SIGNIFICANT CHANGE UP
LYMPHOCYTES # BLD AUTO: 3.25 K/UL — SIGNIFICANT CHANGE UP (ref 1–3.3)
LYMPHOCYTES # BLD AUTO: 39.6 % — SIGNIFICANT CHANGE UP (ref 13–44)
MACROCYTES BLD QL: SLIGHT — SIGNIFICANT CHANGE UP
MAGNESIUM SERPL-MCNC: 1.9 MG/DL — SIGNIFICANT CHANGE UP (ref 1.6–2.6)
MANUAL SMEAR VERIFICATION: SIGNIFICANT CHANGE UP
MCHC RBC-ENTMCNC: 27.2 PG — SIGNIFICANT CHANGE UP (ref 27–34)
MCHC RBC-ENTMCNC: 30.2 GM/DL — LOW (ref 32–36)
MCV RBC AUTO: 89.9 FL — SIGNIFICANT CHANGE UP (ref 80–100)
MONOCYTES # BLD AUTO: 0.71 K/UL — SIGNIFICANT CHANGE UP (ref 0–0.9)
MONOCYTES NFR BLD AUTO: 8.6 % — SIGNIFICANT CHANGE UP (ref 2–14)
MYELOCYTES NFR BLD: 0.9 % — HIGH (ref 0–0)
NEUTROPHILS # BLD AUTO: 3.75 K/UL — SIGNIFICANT CHANGE UP (ref 1.8–7.4)
NEUTROPHILS NFR BLD AUTO: 45.7 % — SIGNIFICANT CHANGE UP (ref 43–77)
NRBC # BLD: 1 /100 WBCS — HIGH (ref 0–0)
NRBC # BLD: SIGNIFICANT CHANGE UP /100 WBCS (ref 0–0)
PHOSPHATE SERPL-MCNC: 5.9 MG/DL — HIGH (ref 2.5–4.5)
PLAT MORPH BLD: ABNORMAL
PLATELET # BLD AUTO: 248 K/UL — SIGNIFICANT CHANGE UP (ref 150–400)
POIKILOCYTOSIS BLD QL AUTO: SIGNIFICANT CHANGE UP
POLYCHROMASIA BLD QL SMEAR: SLIGHT — SIGNIFICANT CHANGE UP
POTASSIUM SERPL-MCNC: 3.8 MMOL/L — SIGNIFICANT CHANGE UP (ref 3.5–5.3)
POTASSIUM SERPL-SCNC: 3.8 MMOL/L — SIGNIFICANT CHANGE UP (ref 3.5–5.3)
PROT SERPL-MCNC: 6.3 G/DL — SIGNIFICANT CHANGE UP (ref 6–8.3)
RBC # BLD: 2.76 M/UL — LOW (ref 4.2–5.8)
RBC # FLD: 17.6 % — HIGH (ref 10.3–14.5)
RBC BLD AUTO: ABNORMAL
SCHISTOCYTES BLD QL AUTO: SLIGHT — SIGNIFICANT CHANGE UP
SODIUM SERPL-SCNC: 132 MMOL/L — LOW (ref 135–145)
WBC # BLD: 8.2 K/UL — SIGNIFICANT CHANGE UP (ref 3.8–10.5)
WBC # FLD AUTO: 8.2 K/UL — SIGNIFICANT CHANGE UP (ref 3.8–10.5)

## 2024-05-01 PROCEDURE — 90935 HEMODIALYSIS ONE EVALUATION: CPT

## 2024-05-01 PROCEDURE — 99233 SBSQ HOSP IP/OBS HIGH 50: CPT | Mod: GC

## 2024-05-01 RX ORDER — CHLORHEXIDINE GLUCONATE 213 G/1000ML
1 SOLUTION TOPICAL DAILY
Refills: 0 | Status: DISCONTINUED | OUTPATIENT
Start: 2024-05-01 | End: 2024-05-02

## 2024-05-01 RX ORDER — ACETAMINOPHEN 500 MG
650 TABLET ORAL EVERY 6 HOURS
Refills: 0 | Status: DISCONTINUED | OUTPATIENT
Start: 2024-05-01 | End: 2024-05-02

## 2024-05-01 RX ADMIN — IRON SUCROSE 176.67 MILLIGRAM(S): 20 INJECTION, SOLUTION INTRAVENOUS at 13:14

## 2024-05-01 RX ADMIN — Medication 12.5 MILLIGRAM(S): at 17:44

## 2024-05-01 RX ADMIN — APIXABAN 5 MILLIGRAM(S): 2.5 TABLET, FILM COATED ORAL at 23:01

## 2024-05-01 RX ADMIN — APIXABAN 5 MILLIGRAM(S): 2.5 TABLET, FILM COATED ORAL at 13:14

## 2024-05-01 RX ADMIN — PIPERACILLIN AND TAZOBACTAM 25 GRAM(S): 4; .5 INJECTION, POWDER, LYOPHILIZED, FOR SOLUTION INTRAVENOUS at 00:01

## 2024-05-01 RX ADMIN — CLOPIDOGREL BISULFATE 75 MILLIGRAM(S): 75 TABLET, FILM COATED ORAL at 13:14

## 2024-05-01 RX ADMIN — PIPERACILLIN AND TAZOBACTAM 25 GRAM(S): 4; .5 INJECTION, POWDER, LYOPHILIZED, FOR SOLUTION INTRAVENOUS at 13:14

## 2024-05-01 RX ADMIN — CHLORHEXIDINE GLUCONATE 1 APPLICATION(S): 213 SOLUTION TOPICAL at 13:14

## 2024-05-01 RX ADMIN — PIPERACILLIN AND TAZOBACTAM 25 GRAM(S): 4; .5 INJECTION, POWDER, LYOPHILIZED, FOR SOLUTION INTRAVENOUS at 23:59

## 2024-05-01 RX ADMIN — ATORVASTATIN CALCIUM 40 MILLIGRAM(S): 80 TABLET, FILM COATED ORAL at 23:01

## 2024-05-01 RX ADMIN — Medication 1: at 22:59

## 2024-05-01 NOTE — DISCHARGE NOTE PROVIDER - NSDCCPCAREPLAN_GEN_ALL_CORE_FT
PRINCIPAL DISCHARGE DIAGNOSIS  Diagnosis: ESRD on dialysis  Assessment and Plan of Treatment: ESRD (end-stage renal disease) is a longstanding disease of the kidneys leading to renal failure. The kidneys filter waste and excess fluid from the blood. As kidneys fail, waste builds up. Symptoms develop slowly and aren't specific to the disease. You presented with shortness of breath that can likely have many causes but in your case was in part precipitated by your ESRD. Some people have no symptoms at all and are diagnosed by a lab test. Medications help manage symptoms. In later stages such as your case, filtering the blood with a machine (dialysis) is needed. While in the hospital, you received your first few sessions of dialysis as well. You underwent a procedure and had a tunneled dialysis catheter placed through which you can receive dialysis outpatient. Please continue to get dialysis on your scheduled days and follow up with a nephrologist and your PCP in 10-14 days.     PRINCIPAL DISCHARGE DIAGNOSIS  Diagnosis: ESRD on dialysis  Assessment and Plan of Treatment: ESRD (end-stage renal disease) is a longstanding disease of the kidneys leading to renal failure. The kidneys filter waste and excess fluid from the blood. As kidneys fail, waste builds up. Symptoms develop slowly and aren't specific to the disease. You presented with shortness of breath that can likely have many causes but in your case was in part precipitated by your ESRD. Some people have no symptoms at all and are diagnosed by a lab test. Medications help manage symptoms. In later stages such as your case, filtering the blood with a machine (dialysis) is needed. While in the hospital, you received your first few sessions of dialysis as well. You underwent a procedure and had a tunneled dialysis catheter placed through which you can receive dialysis outpatient. Please continue to get dialysis on your scheduled days and follow up with a nephrologist and your PCP in 10-14 days.      SECONDARY DISCHARGE DIAGNOSES  Diagnosis: Heart failure with reduced ejection fraction  Assessment and Plan of Treatment: You have a history of heart failure with reduced ejection fraction. While in the hospital, you were seen by the cardiology team to get their recommendations on the best medication regimen for you given your heart history. Upon discharge, PLEASE TAKE _________. Please follow-up with cardiology within 1-2 weeks following hospital discharge.     PRINCIPAL DISCHARGE DIAGNOSIS  Diagnosis: ESRD on dialysis  Assessment and Plan of Treatment: ESRD (end-stage renal disease) is a longstanding disease of the kidneys leading to renal failure. The kidneys filter waste and excess fluid from the blood. As kidneys fail, waste builds up. Symptoms develop slowly and aren't specific to the disease. You presented with shortness of breath that can likely have many causes but in your case was in part precipitated by your ESRD. Some people have no symptoms at all and are diagnosed by a lab test. Medications help manage symptoms. In later stages such as your case, filtering the blood with a machine (dialysis) is needed. While in the hospital, you received your first few sessions of dialysis as well. You underwent a procedure and had a tunneled dialysis catheter placed through which you can receive dialysis outpatient. Please continue to get dialysis on your scheduled days and follow up with a nephrologist and your PCP in 10-14 days.      SECONDARY DISCHARGE DIAGNOSES  Diagnosis: Heart failure with reduced ejection fraction  Assessment and Plan of Treatment: You have a history of heart failure with reduced ejection fraction. While in the hospital, you were seen by the cardiology team to get their recommendations on the best medication regimen for you given your heart history. Upon discharge, PLEASE TAKE toprol 25mg once a day. Follow-up with cardiology within 1-2 weeks following hospital discharge.     PRINCIPAL DISCHARGE DIAGNOSIS  Diagnosis: ESRD on dialysis  Assessment and Plan of Treatment: ESRD (end-stage renal disease) is a longstanding disease of the kidneys leading to renal failure. The kidneys filter waste and excess fluid from the blood. As kidneys fail, waste builds up. Symptoms develop slowly and aren't specific to the disease. You presented with shortness of breath that can likely have many causes but in your case was in part precipitated by your ESRD. Some people have no symptoms at all and are diagnosed by a lab test. Medications help manage symptoms. In later stages such as your case, filtering the blood with a machine (dialysis) is needed. While in the hospital, you received your first few sessions of dialysis as well. You underwent a procedure and had a tunneled dialysis catheter placed through which you can receive dialysis outpatient. Please continue to get dialysis on your scheduled days and follow up with a nephrologist and your PCP in 10-14 days. Please make sure to schedule an appointment with Dr. Montesinos to discuss safely resuming your LOSARTAN 25mg once daily.      SECONDARY DISCHARGE DIAGNOSES  Diagnosis: Heart failure with reduced ejection fraction  Assessment and Plan of Treatment: You have a history of heart failure with reduced ejection fraction. While in the hospital, you were seen by the cardiology team to get their recommendations on the best medication regimen for you given your heart history. Upon discharge, PLEASE TAKE toprol 25mg once a day. Follow-up with cardiology within 1-2 weeks following hospital discharge.

## 2024-05-01 NOTE — DISCHARGE NOTE PROVIDER - NSDCFUADDAPPT_GEN_ALL_CORE_FT
(1) Please note the office of Dr. Michelle Sadler will contact you directly to schedule an appointment following your hospital discharge.    Appointment was facilitated by Ms. IRASEMA Valderrama, Referral Coordinator.

## 2024-05-01 NOTE — PROGRESS NOTE ADULT - PROBLEM SELECTOR PLAN 8
H/o of HFrEF with severe aortic stenosis, now s/p TAVR 03/2022 and still with persistent LV dysfunction.   Followed by Dr. Aldrich at [facility]. Appears to be in exacerbation overload on exam with bibasilar crackles and peripheral edema Home meds on lasix prn, coreg and eliquis.  - TTE 2/2021: Moderate LV dilatation with EF 35% with RWA, bicuspid aortic valve severe LFLG aortic stenosis (MG 50, CEDRIC 0.68), dilated ascending aorta 4.5cm  - TTE 7/2022: Normal LV size and thickness, s/p TAVR (peak velocity 2.2 m/s, DVI 0.43) with trace AR, normal RV function, dilated ascending aorta 4.8cm  - TTE 4/2024: sev reduced LVSF, LE EF 30%, grade II DD, trace AR/MR, global hypokinesis.  - Cardiology following, recs appreciated  - rest of GDMT held iso worsening CKD (losartan and SGLTi-2) and soft BP

## 2024-05-01 NOTE — PROGRESS NOTE ADULT - PROBLEM SELECTOR PLAN 9
H/o of obstructive CAD - possibly offered CABG prior to TAVR which was ultimately deferred. Presenting with troponemia likely iso demand ischemia with CAD and acute on chronic renal failure  [facility] 12/2021: 80-90% LPL, 70-80% OM1 and D1, 60-70% distal RCA  EKG 4/29/24: NSR with LBBB, LVH, ST-T changes due to strain  - seen by cardiology for risk stratification prior to TDC placement  - EKG stable, patient does not c/o of active chest pain

## 2024-05-01 NOTE — PROGRESS NOTE ADULT - PROBLEM SELECTOR PLAN 6
h/o DM c/b OM. Last A1c 5.0 (03/2024), Dx at age 34 y/o, on insulin at home which he self titrates  - ISS  - monitor FS

## 2024-05-01 NOTE — PROGRESS NOTE ADULT - ASSESSMENT
68M PMHx AFib, PAD, DM2 w diabetic foot wounds (sp amputation left toe, on zosyn through 5/10 for tx of OM), COPD, colon cancer (colostomy bag), and CKD5, presenting with worsening shortness of breath, found to be fluid overloaded, w KAT on CKD (Cr of 7.84 increased from baseline 3.9), admitted for urgent initiation of HD. 68M PMHx AFib, PAD, DM2 w diabetic foot wounds (sp amputation left toe, on zosyn through 5/10 for tx of OM), COPD, colon cancer (colostomy bag), and CKD5, presenting with worsening shortness of breath, found to be fluid overloaded, w KAT on CKD (Cr of 7.84 increased from baseline 3.9), admitted for urgent initiation of HD now s/p temporary femoral cath placement and 2 dialysis sessions completed. Pending IR-guided TDC placement tomorrow 05/02

## 2024-05-01 NOTE — PROGRESS NOTE ADULT - PROBLEM SELECTOR PLAN 3
Recently admitted to [facility] and found to have OM of R foot +pseudomonal, d/c'ed on IV zosyn through 05/10   - repeat XR foot with 3 views which showed no tracking gas collections or gross radiographic evidence for OM  - c/w zosyn until 5/10  - seen by podiatry, wound dressing changed

## 2024-05-01 NOTE — PROGRESS NOTE ADULT - SUBJECTIVE AND OBJECTIVE BOX
Patient is a 68y Male seen and evaluated at HD unit, Does not offer any complaints.   States overall feels much better  HD # 3 today via fem cath, IR procedure for TDC cancelled today and rescheduled for tomorrow   POCU with B-lines b/l, also on home CPAP, will aim for 2.5L UF       Meds:    acetaminophen     Tablet .. 650 every 6 hours PRN  apixaban 5 every 12 hours  atorvastatin 40 at bedtime  chlorhexidine 2% Cloths 1 daily  clopidogrel Tablet 75 daily  dextrose 10% Bolus 125 once  dextrose 5%. 1000 <Continuous>  dextrose 5%. 1000 <Continuous>  dextrose 50% Injectable 12.5 once  dextrose 50% Injectable 25 once  dextrose Oral Gel 15 once PRN  glucagon  Injectable 1 once  influenza  Vaccine (HIGH DOSE) 0.7 once  insulin lispro (ADMELOG) corrective regimen sliding scale  Before meals and at bedtime  iron sucrose IVPB 300 <User Schedule>  metoprolol tartrate 12.5 every 12 hours  piperacillin/tazobactam IVPB.. 4.5 every 12 hours      T(C): , Max: 37.2 (05-01-24 @ 05:37)  T(F): , Max: 98.9 (05-01-24 @ 05:37)  HR: 75 (05-01-24 @ 09:15)  BP: 123/68 (05-01-24 @ 09:15)  BP(mean): 84 (04-30-24 @ 15:14)  RR: 18 (05-01-24 @ 09:15)  SpO2: 99% (05-01-24 @ 09:15)  Wt(kg): --    04-30 @ 07:01  -  05-01 @ 07:00  --------------------------------------------------------  IN: 0 mL / OUT: 1500 mL / NET: -1500 mL          Review of Systems:  ROS negative except as per HPI      PHYSICAL EXAM:  GENERAL: NAD, laying in bed tolerating HD, home CPAP  NECK: supple, No JVD  CHEST/LUNG: diminished breath sounds   HEART: normal S1S2, RRR  ABDOMEN: Soft, Nontender   EXTREMITIES: No clubbing, cyanosis, 1+ LE edema  NEUROLOGY: AAO x3, no focal neurological deficit  ACCESS: left fem HD cath accessed       LABS:                        7.5    8.20  )-----------( 248      ( 01 May 2024 05:30 )             24.8     05-01    132<L>  |  100  |  49<H>  ----------------------------<  98  3.8   |  17<L>  |  6.02<H>    Ca    8.5      01 May 2024 05:30  Phos  5.9     05-01  Mg     1.9     05-01    TPro  6.3  /  Alb  3.1<L>  /  TBili  0.3  /  DBili  x   /  AST  15  /  ALT  7<L>  /  AlkPhos  66  05-01      PT/INR - ( 30 Apr 2024 05:30 )   PT: 14.1 sec;   INR: 1.24          PTT - ( 30 Apr 2024 05:30 )  PTT:38.0 sec  Urinalysis Basic - ( 01 May 2024 05:30 )    Color: x / Appearance: x / SG: x / pH: x  Gluc: 98 mg/dL / Ketone: x  / Bili: x / Urobili: x   Blood: x / Protein: x / Nitrite: x   Leuk Esterase: x / RBC: x / WBC x   Sq Epi: x / Non Sq Epi: x / Bacteria: x            RADIOLOGY & ADDITIONAL STUDIES:

## 2024-05-01 NOTE — PROGRESS NOTE ADULT - PROBLEM SELECTOR PROBLEM 2
Physical Therapy      Patient Name:  Nancy Crowell   MRN:  [id number]    Patient not seen today secondary to  (pt. with c/o weakness and diarrhea). Will follow-up tomorrow.    Francisco Javier Feng, PT  8/4/2022         ESRD (end stage renal disease)

## 2024-05-01 NOTE — PROGRESS NOTE ADULT - PROBLEM SELECTOR PLAN 1
Patient with worsening SOB over the last weeks to months, likely multifactorial iso volume overload 2/2 CHF (off GDMT) vs acute on chronic renal failure vs obstructive CAD vs COPD/ZAYDA vs obesity vs R-sided vs diaphragmatic elevation. Initially presenting on NRB (home O2 NC2-4L). Additionally with worsening respiratory alkalosis compensating for mixed metabolic acidosis 2/2 ARF.   - BNP>7000  - CXR w/ vascular congestion bilaterally  - s/p 100 mg IV lasix x1 with no change in UOP  - intermittently asking for BiPAP with NC on, although no hypoxia. Remains at % on 4L NC Patient with worsening SOB over the last weeks to months, likely multifactorial iso volume overload 2/2 CHF (off GDMT) vs acute on chronic renal failure vs obstructive CAD vs COPD/ZAYDA vs obesity vs R-sided vs diaphragmatic elevation. Initially presenting on NRB (home O2 NC2-4L). Additionally with worsening respiratory alkalosis compensating for mixed metabolic acidosis 2/2 ARF. s/p 100 mg IV lasix x1 with no change in UOP  - BNP>7000  - CXR w/ vascular congestion bilaterally  - intermittently asking for BiPAP with NC on, although no hypoxia. Remains at % on 4L NC

## 2024-05-01 NOTE — PROGRESS NOTE ADULT - SUBJECTIVE AND OBJECTIVE BOX
******INCOMPLETE******    OVERNIGHT EVENTS/INTERVAL HPI: MARIPOSA    SUBJECTIVE:     OBJECTIVE:  Vital Signs Last 24 Hrs  T(C): 37.2 (01 May 2024 05:37), Max: 37.2 (01 May 2024 05:37)  T(F): 98.9 (01 May 2024 05:37), Max: 98.9 (01 May 2024 05:37)  HR: 71 (01 May 2024 05:37) (67 - 87)  BP: 118/62 (01 May 2024 05:37) (107/59 - 129/68)  BP(mean): 84 (30 Apr 2024 15:14) (64 - 85)  RR: 19 (01 May 2024 05:37) (17 - 24)  SpO2: 100% (01 May 2024 05:37) (97% - 100%)    Parameters below as of 01 May 2024 05:37  Patient On (Oxygen Delivery Method): BiPAP/CPAP      I&O's Detail    29 Apr 2024 07:01  -  30 Apr 2024 07:00  --------------------------------------------------------  IN:  Total IN: 0 mL    OUT:    Other (mL): 1000 mL    Voided (mL): 100 mL  Total OUT: 1100 mL    Total NET: -1100 mL      30 Apr 2024 07:01  -  01 May 2024 06:08  --------------------------------------------------------  IN:  Total IN: 0 mL    OUT:    Other (mL): 1500 mL  Total OUT: 1500 mL    Total NET: -1500 mL    Physical Exam:      Medications:  MEDICATIONS  (STANDING):  apixaban 5 milliGRAM(s) Oral every 12 hours  atorvastatin 40 milliGRAM(s) Oral at bedtime  clopidogrel Tablet 75 milliGRAM(s) Oral daily  dextrose 10% Bolus 125 milliLiter(s) IV Bolus once  dextrose 5%. 1000 milliLiter(s) (50 mL/Hr) IV Continuous <Continuous>  dextrose 5%. 1000 milliLiter(s) (100 mL/Hr) IV Continuous <Continuous>  dextrose 50% Injectable 12.5 Gram(s) IV Push once  dextrose 50% Injectable 25 Gram(s) IV Push once  glucagon  Injectable 1 milliGRAM(s) IntraMuscular once  influenza  Vaccine (HIGH DOSE) 0.7 milliLiter(s) IntraMuscular once  insulin lispro (ADMELOG) corrective regimen sliding scale   SubCutaneous three times a day before meals  iron sucrose IVPB 300 milliGRAM(s) IV Intermittent <User Schedule>  metoprolol tartrate 12.5 milliGRAM(s) Oral every 12 hours  piperacillin/tazobactam IVPB.. 4.5 Gram(s) IV Intermittent every 12 hours    MEDICATIONS  (PRN):  dextrose Oral Gel 15 Gram(s) Oral once PRN Blood Glucose LESS THAN 70 milliGRAM(s)/deciliter      Labs:                ******INCOMPLETE******    OVERNIGHT EVENTS/INTERVAL HPI: MARIPOSA    SUBJECTIVE: Patient seen and examined at bedside. Has BiPAP on at time of exam. Patient states he feels like his symptoms are getting better. Interested in going to dialysis today before TDC catheter placement if possible. No other complaints this morning.    OBJECTIVE:  Vital Signs Last 24 Hrs  T(C): 37.2 (01 May 2024 05:37), Max: 37.2 (01 May 2024 05:37)  T(F): 98.9 (01 May 2024 05:37), Max: 98.9 (01 May 2024 05:37)  HR: 71 (01 May 2024 05:37) (67 - 87)  BP: 118/62 (01 May 2024 05:37) (107/59 - 129/68)  BP(mean): 84 (30 Apr 2024 15:14) (64 - 85)  RR: 19 (01 May 2024 05:37) (17 - 24)  SpO2: 100% (01 May 2024 05:37) (97% - 100%)    Parameters below as of 01 May 2024 05:37  Patient On (Oxygen Delivery Method): BiPAP/CPAP      I&O's Detail    29 Apr 2024 07:01  -  30 Apr 2024 07:00  --------------------------------------------------------  IN:  Total IN: 0 mL    OUT:    Other (mL): 1000 mL    Voided (mL): 100 mL  Total OUT: 1100 mL    Total NET: -1100 mL      30 Apr 2024 07:01  -  01 May 2024 06:08  --------------------------------------------------------  IN:  Total IN: 0 mL    OUT:    Other (mL): 1500 mL  Total OUT: 1500 mL    Total NET: -1500 mL    Physical Exam:  General: alert, in no acute distress, +BiPAP in place   HEENT: NC/AT; PERRL, anicteric sclera; poor dentition   Neck: supple, wide circumference   Cardiovascular: +S1/S2, RRR  Respiratory: CTA B/L   Gastrointestinal: obese, soft, NT/ND; +BSx4, +ostomy with brown output   Extremities: WWP; 2+ LE edema to the knee, R foot wrapped and with dressing  Neurological: AAOx3; no focal deficits    Medications:  MEDICATIONS  (STANDING):  apixaban 5 milliGRAM(s) Oral every 12 hours  atorvastatin 40 milliGRAM(s) Oral at bedtime  clopidogrel Tablet 75 milliGRAM(s) Oral daily  dextrose 10% Bolus 125 milliLiter(s) IV Bolus once  dextrose 5%. 1000 milliLiter(s) (50 mL/Hr) IV Continuous <Continuous>  dextrose 5%. 1000 milliLiter(s) (100 mL/Hr) IV Continuous <Continuous>  dextrose 50% Injectable 12.5 Gram(s) IV Push once  dextrose 50% Injectable 25 Gram(s) IV Push once  glucagon  Injectable 1 milliGRAM(s) IntraMuscular once  influenza  Vaccine (HIGH DOSE) 0.7 milliLiter(s) IntraMuscular once  insulin lispro (ADMELOG) corrective regimen sliding scale   SubCutaneous three times a day before meals  iron sucrose IVPB 300 milliGRAM(s) IV Intermittent <User Schedule>  metoprolol tartrate 12.5 milliGRAM(s) Oral every 12 hours  piperacillin/tazobactam IVPB.. 4.5 Gram(s) IV Intermittent every 12 hours    MEDICATIONS  (PRN):  dextrose Oral Gel 15 Gram(s) Oral once PRN Blood Glucose LESS THAN 70 milliGRAM(s)/deciliter      Labs:                          7.5    8.20  )-----------( 248      ( 01 May 2024 05:30 )             24.8     05-01    132<L>  |  100  |  49<H>  ----------------------------<  98  3.8   |  17<L>  |  6.02<H>    Ca    8.5      01 May 2024 05:30  Phos  5.9     05-01  Mg     1.9     05-01    TPro  6.3  /  Alb  3.1<L>  /  TBili  0.3  /  DBili  x   /  AST  15  /  ALT  7<L>  /  AlkPhos  66  05-01    PT/INR - ( 30 Apr 2024 05:30 )   PT: 14.1 sec;   INR: 1.24          PTT - ( 30 Apr 2024 05:30 )  PTT:38.0 sec  Urinalysis Basic - ( 01 May 2024 05:30 )    Color: x / Appearance: x / SG: x / pH: x  Gluc: 98 mg/dL / Ketone: x  / Bili: x / Urobili: x   Blood: x / Protein: x / Nitrite: x   Leuk Esterase: x / RBC: x / WBC x   Sq Epi: x / Non Sq Epi: x / Bacteria: x                RADIOLOGY, EKG & ADDITIONAL TESTS: Reviewed.              OVERNIGHT EVENTS/INTERVAL HPI: MARIPOSA    SUBJECTIVE: Patient seen and examined at bedside. Has BiPAP on at time of exam. Patient states he feels like his symptoms are getting better. Interested in going to dialysis today before TDC catheter placement if possible. No other complaints this morning.    OBJECTIVE:  Vital Signs Last 24 Hrs  T(C): 37.2 (01 May 2024 05:37), Max: 37.2 (01 May 2024 05:37)  T(F): 98.9 (01 May 2024 05:37), Max: 98.9 (01 May 2024 05:37)  HR: 71 (01 May 2024 05:37) (67 - 87)  BP: 118/62 (01 May 2024 05:37) (107/59 - 129/68)  BP(mean): 84 (30 Apr 2024 15:14) (64 - 85)  RR: 19 (01 May 2024 05:37) (17 - 24)  SpO2: 100% (01 May 2024 05:37) (97% - 100%)    Parameters below as of 01 May 2024 05:37  Patient On (Oxygen Delivery Method): BiPAP/CPAP      I&O's Detail    29 Apr 2024 07:01  -  30 Apr 2024 07:00  --------------------------------------------------------  IN:  Total IN: 0 mL    OUT:    Other (mL): 1000 mL    Voided (mL): 100 mL  Total OUT: 1100 mL    Total NET: -1100 mL      30 Apr 2024 07:01  -  01 May 2024 06:08  --------------------------------------------------------  IN:  Total IN: 0 mL    OUT:    Other (mL): 1500 mL  Total OUT: 1500 mL    Total NET: -1500 mL    Physical Exam:  General: alert, in no acute distress, +BiPAP in place   HEENT: NC/AT; PERRL, anicteric sclera; poor dentition   Neck: supple, wide circumference   Cardiovascular: +S1/S2, RRR  Respiratory: CTA B/L   Gastrointestinal: obese, soft, NT/ND; +BSx4, +ostomy with brown output   Extremities: WWP; 2+ LE edema to the knee, R foot wrapped and with dressing  Neurological: AAOx3; no focal deficits    Medications:  MEDICATIONS  (STANDING):  apixaban 5 milliGRAM(s) Oral every 12 hours  atorvastatin 40 milliGRAM(s) Oral at bedtime  clopidogrel Tablet 75 milliGRAM(s) Oral daily  dextrose 10% Bolus 125 milliLiter(s) IV Bolus once  dextrose 5%. 1000 milliLiter(s) (50 mL/Hr) IV Continuous <Continuous>  dextrose 5%. 1000 milliLiter(s) (100 mL/Hr) IV Continuous <Continuous>  dextrose 50% Injectable 12.5 Gram(s) IV Push once  dextrose 50% Injectable 25 Gram(s) IV Push once  glucagon  Injectable 1 milliGRAM(s) IntraMuscular once  influenza  Vaccine (HIGH DOSE) 0.7 milliLiter(s) IntraMuscular once  insulin lispro (ADMELOG) corrective regimen sliding scale   SubCutaneous three times a day before meals  iron sucrose IVPB 300 milliGRAM(s) IV Intermittent <User Schedule>  metoprolol tartrate 12.5 milliGRAM(s) Oral every 12 hours  piperacillin/tazobactam IVPB.. 4.5 Gram(s) IV Intermittent every 12 hours    MEDICATIONS  (PRN):  dextrose Oral Gel 15 Gram(s) Oral once PRN Blood Glucose LESS THAN 70 milliGRAM(s)/deciliter      Labs:                          7.5    8.20  )-----------( 248      ( 01 May 2024 05:30 )             24.8     05-01    132<L>  |  100  |  49<H>  ----------------------------<  98  3.8   |  17<L>  |  6.02<H>    Ca    8.5      01 May 2024 05:30  Phos  5.9     05-01  Mg     1.9     05-01    TPro  6.3  /  Alb  3.1<L>  /  TBili  0.3  /  DBili  x   /  AST  15  /  ALT  7<L>  /  AlkPhos  66  05-01    PT/INR - ( 30 Apr 2024 05:30 )   PT: 14.1 sec;   INR: 1.24          PTT - ( 30 Apr 2024 05:30 )  PTT:38.0 sec  Urinalysis Basic - ( 01 May 2024 05:30 )    Color: x / Appearance: x / SG: x / pH: x  Gluc: 98 mg/dL / Ketone: x  / Bili: x / Urobili: x   Blood: x / Protein: x / Nitrite: x   Leuk Esterase: x / RBC: x / WBC x   Sq Epi: x / Non Sq Epi: x / Bacteria: x                RADIOLOGY, EKG & ADDITIONAL TESTS: Reviewed.

## 2024-05-01 NOTE — DISCHARGE NOTE PROVIDER - NSDCHHPTASSISTHOME_GEN_ALL_CORE
"Everardo Gamez (BRYSON) was seen and treated in our emergency department on 3/8/2024.  He may return to school on 03/11/2024.      If you have any questions or concerns, please don't hesitate to call.       RN" Patient Needs Assistance to Leave Residence...

## 2024-05-01 NOTE — DISCHARGE NOTE PROVIDER - NSDCMRMEDTOKEN_GEN_ALL_CORE_FT
Bedside Table: In need of Bedside Table  carvedilol 6.25 mg oral tablet: 1 tab(s) orally every 12 hours  Cozaar 25 mg oral tablet: 1 tab(s) orally once a day  Crestor 20 mg oral tablet: 1 tab(s) orally once a day (at bedtime)  Eliquis 5 mg oral tablet: 1 tab(s) orally once a day  epoetin ori: 10,000 unit(s) once a week weekly  gabapentin 100 mg oral capsule: 1 cap(s) orally every 8 hours  Lasix 40 mg oral tablet: 1 tab(s) orally once a day  oxyCODONE 5 mg oral tablet: 1 tab(s) orally every 6 hours As needed Moderate Pain (4 - 6)  oxyCODONE 7.5 mg oral tablet: 1 tab(s) orally every 6 hours As needed Severe Pain (7 - 10)  piperacillin-tazobactam: 4.5 gram(s) every 12 hours 4.5 grams IV q12 x 6 weeks (3/29/24 - 5/10/24)  Plavix 75 mg oral tablet: 1 tab(s) orally once a day  Shower Chair: Patient in need of Shower Chair  sodium bicarbonate 650 mg oral tablet: 2 tab(s) orally 3 times a day  Toprol-XL 25 mg oral tablet, extended release: 1 tab(s) orally once a day  Zoloft 50 mg oral tablet: 1 tab(s) orally once a day   carvedilol 6.25 mg oral tablet: 1 tab(s) orally every 12 hours  cholecalciferol 50 mcg (2000 intl units) oral tablet: 1 tab(s) orally once a day  Cozaar 25 mg oral tablet: 1 tab(s) orally once a day  Crestor 20 mg oral tablet: 1 tab(s) orally once a day (at bedtime)  Eliquis 5 mg oral tablet: 1 tab(s) orally once a day  epoetin ori: 10,000 unit(s) once a week weekly  gabapentin 100 mg oral capsule: 1 cap(s) orally every 8 hours  Lasix 40 mg oral tablet: 1 tab(s) orally once a day  oxyCODONE 5 mg oral tablet: 1 tab(s) orally every 6 hours As needed Moderate Pain (4 - 6)  oxyCODONE 7.5 mg oral tablet: 1 tab(s) orally every 6 hours As needed Severe Pain (7 - 10)  piperacillin-tazobactam: 4.5 gram(s) every 12 hours 4.5 grams IV q12 x 6 weeks (3/29/24 - 5/10/24)  Plavix 75 mg oral tablet: 1 tab(s) orally once a day  sevelamer carbonate 800 mg oral tablet: 1 tab(s) orally every 8 hours  sodium bicarbonate 650 mg oral tablet: 2 tab(s) orally 3 times a day  Toprol-XL 25 mg oral tablet, extended release: 1 tab(s) orally once a day  Zoloft 50 mg oral tablet: 1 tab(s) orally once a day   cholecalciferol 50 mcg (2000 intl units) oral tablet: 1 tab(s) orally once a day  Cozaar 25 mg oral tablet: 1 tab(s) orally once a day  Crestor 20 mg oral tablet: 1 tab(s) orally once a day (at bedtime)  Eliquis 5 mg oral tablet: 1 tab(s) orally once a day  epoetin ori: 10,000 unit(s) once a week weekly  gabapentin 100 mg oral capsule: 1 cap(s) orally every 8 hours  Lasix 40 mg oral tablet: 1 tab(s) orally once a day  oxyCODONE 5 mg oral tablet: 1 tab(s) orally every 6 hours As needed Moderate Pain (4 - 6)  oxyCODONE 7.5 mg oral tablet: 1 tab(s) orally every 6 hours As needed Severe Pain (7 - 10)  piperacillin-tazobactam: 4.5 gram(s) every 12 hours 4.5 grams IV q12 x 6 weeks (3/29/24 - 5/10/24)  Plavix 75 mg oral tablet: 1 tab(s) orally once a day  sevelamer carbonate 800 mg oral tablet: 1 tab(s) orally every 8 hours  sodium bicarbonate 650 mg oral tablet: 2 tab(s) orally 3 times a day  Toprol-XL 25 mg oral tablet, extended release: 1 tab(s) orally once a day  Zoloft 50 mg oral tablet: 1 tab(s) orally once a day   cholecalciferol 50 mcg (2000 intl units) oral tablet: 1 tab(s) orally once a day  Crestor 20 mg oral tablet: 1 tab(s) orally once a day (at bedtime)  Eliquis 5 mg oral tablet: 1 tab(s) orally once a day  epoetin ori: 10,000 unit(s) once a week weekly  gabapentin 100 mg oral capsule: 1 cap(s) orally every 8 hours  Lasix 40 mg oral tablet: 1 tab(s) orally once a day  oxyCODONE 5 mg oral tablet: 1 tab(s) orally every 6 hours As needed Moderate Pain (4 - 6)  oxyCODONE 7.5 mg oral tablet: 1 tab(s) orally every 6 hours As needed Severe Pain (7 - 10)  piperacillin-tazobactam: 4.5 gram(s) every 12 hours 4.5 grams IV q12 x 6 weeks (3/29/24 - 5/10/24)  Plavix 75 mg oral tablet: 1 tab(s) orally once a day  sevelamer carbonate 800 mg oral tablet: 1 tab(s) orally every 8 hours  sodium bicarbonate 650 mg oral tablet: 2 tab(s) orally 3 times a day  Toprol-XL 25 mg oral tablet, extended release: 1 tab(s) orally once a day  Zoloft 50 mg oral tablet: 1 tab(s) orally once a day

## 2024-05-01 NOTE — PROGRESS NOTE ADULT - PROBLEM SELECTOR PROBLEM 11
Abdominal aortic aneurysm

## 2024-05-01 NOTE — PROGRESS NOTE ADULT - PROBLEM SELECTOR PLAN 11
known h/o AAA, Last echo done 07/2022 showed dilated ascending aorta 4.8cm  - no acute intervention at this time  - continue with BP optimization

## 2024-05-01 NOTE — DISCHARGE NOTE PROVIDER - HOSPITAL COURSE
#Discharge: do not delete    BRITTANY JOHNSTON is a 69 y/o M w/ PMhx of unrevascularized 3V CAD (RCA, Lcx, LAD), aortic stenosis s/p TAVR 3/2022, PAD s/p angioplasty 1/2023, HFrEF (EF 35-40%), Afib on Eliquis, AAA, CKD 5 (baseline sCr 3.9), colon cancer s/p colostomy, IDDM2, with recent admission for OM of R foot who presents with worsening dyspnea as advised by nephrologist (Dr. Montesinos), admitted for urgent initiation of HD.    Hospital course (by problem):     #ESRD (end stage renal disease)  Known h/o CKD Stage 5, followed by Dr. Montesinos. UOP 10 cc at baseline. Now with worsening SOB and acidosis requiring HD. On admission, labs showed mixed metabolic acidosis with compensated respiratory alkalosis. Nephrology consulted. Temporary femoral cath placed 04/29. S/p 3 dialysis sessions with femoral Cath (HD #1 04/29 __, HD #2 04/30 __, HD #3 05/01 __). IR-guided tunneled dialysis catheter placed 05/02.   -discharge to dialysis center      #Acute respiratory failure with hypoxia  Patient with worsening SOB over the last weeks to months, likely multifactorial iso volume overload 2/2 CHF (off GDMT), acute on chronic renal failure, bstructive CAD, COPD/ZAYDA, R-sided diaphragmatic elevation. Initially presenting on NRB (home O2 NC2-4L). Additionally with worsening respiratory alkalosis compensating for mixed metabolic acidosis 2/2 ARF. BNP > 7000. CXR with vascular congestion bilaterally. S/p 100 mg IV lasix x1. Treated with 4L NC and BIPAP overnight with symptomatic improvement.   - Continue with home oxygen   -Continue CPAP at night     #Foot osteomyelitis  Recently admitted to [facility] and found to have OM of R foot +pseudomonas, d/c'ed on IV zosyn through 05/10. Repeat XR foot with 3 views which showed no tracking gas collections or gross radiographic evidence for OM. Podiatry consulted.   - Continue IV zosyn 4.5g q12h until 5/10  - Discharge dressing instructions: Cleanse wound with normal sailine daily, pat dry with sterile guaze, apply  betadine soaked gauze to wound base, cover with dry sterile gauze, ABD pad, wrap with Kerlix and light ACE bandage.   -F/u in 1 week at following address (any provider):            [facility] ([facility])           [facility] Location            [address], [address]           [place] [address]           [phone]; [phone]           [email or web address]    #Chronic atrial fibrillation  Rate controlled. At home on eliqiuis 5mg BID, coreg 6.25. Cardiology consulted. Coreg held while inpatient due to hypotension associated with dialysis. Started on Lopressor 12.5mg q12h.   - Continue eliquis 5 mg q12 for A/C  - changed coreg to lopressor 12.5 q12 for BP control during HD.    #Diabetes  H/o DM c/b OM. Last A1c 5.0 (03/2024), Dx at age 36 y/o, on insulin at home which he self titrates  - Continue with home regimen     #PAD (peripheral artery disease)  H/o severe PAD, s/p R LE angioplasty in 1/2024 with balloon angioplasty in proximal AT and below the knee popliteal with 1 conventional and 1 drug eluting stent. At home on plavix 75 mg qd  - Continue with home meds     #H/o of HFrEF with severe aortic stenosis  Now s/p TAVR 03/2022 and still with persistent LV dysfunction. Followed by Dr. Aldrich at [facility]. Appears to be in exacerbation overload on exam with bibasilar crackles and peripheral edema on admission improved at time of discharge. Home meds on lasix prn, coreg and eliquis.  - TTE 2/2021: Moderate LV dilatation with EF 35% with RWA, bicuspid aortic valve severe LFLG aortic stenosis (MG 50, CEDRIC 0.68), dilated ascending aorta 4.5cm  - TTE 7/2022: Normal LV size and thickness, s/p TAVR (peak velocity 2.2 m/s, DVI 0.43) with trace AR, normal RV function, dilated ascending aorta 4.8cm  - TTE 4/2024: sev reduced LVSF, LE EF 30%, grade II DD, trace AR/MR, global hypokinesis.  - f/u Cardiology recs regarding GDMT    #Three-vessel CAD  H/o of obstructive CAD - possibly offered CABG prior to TAVR which was ultimately deferred. Presenting with troponemia likely iso demand ischemia with CAD and acute on chronic renal failure  [facility] 12/2021: 80-90% LPL, 70-80% OM1 and D1, 60-70% distal RCA  EKG 4/29/24: NSR with LBBB, LVH, ST-T changes due to strain  - f/u Cardiology recs     #Normocytic anemia  Hgb on admission 11.2, MCV 91.4. Currently no signs of active bleeding (no hematochezia, melena, hemoptysis, hematuria), likely 2/2 AoCD and JESSICA. S/p IV iron x3 days.   - Start AGUS     Patient was discharged to: (home/[facility]/acute rehab/hospice, etc, and with what services – home health PT/RN? Home O2?)    New medications:   Changes to old medications:  Medications that were stopped:    Items to follow up as outpatient: f/u PCP, f/u cardiology, f/u podiatry     Physical exam at the time of discharge:       #Discharge: do not delete    BRITTANY JOHNSTON is a 69 y/o M w/ PMhx of unrevascularized 3V CAD (RCA, Lcx, LAD), aortic stenosis s/p TAVR 3/2022, PAD s/p angioplasty 1/2023, HFrEF (EF 35-40%), Afib on Eliquis, AAA, CKD 5 (baseline sCr 3.9), colon cancer s/p colostomy, IDDM2, with recent admission for OM of R foot who presents with worsening dyspnea as advised by nephrologist (Dr. Montesinos), admitted for urgent initiation of HD.    Hospital course (by problem):     #ESRD (end stage renal disease)  Known h/o CKD Stage 5, followed by Dr. Montesinos. UOP 10 cc at baseline. Now with worsening SOB and acidosis requiring HD. On admission, labs showed mixed metabolic acidosis with compensated respiratory alkalosis. Nephrology consulted. Temporary femoral cath placed 04/29. S/p 3 dialysis sessions with femoral Cath (HD #1 04/29 1L, HD #2 04/30 1.5L, HD #3 05/01 2L). IR-guided tunneled dialysis catheter placed 05/02.   -discharge to dialysis center      #Acute respiratory failure with hypoxia  Patient with worsening SOB over the last weeks to months, likely multifactorial iso volume overload 2/2 CHF (off GDMT), acute on chronic renal failure, bstructive CAD, COPD/ZAYDA, R-sided diaphragmatic elevation. Initially presenting on NRB (home O2 NC2-4L). Additionally with worsening respiratory alkalosis compensating for mixed metabolic acidosis 2/2 ARF. BNP > 7000. CXR with vascular congestion bilaterally. S/p 100 mg IV lasix x1. Treated with 4L NC and BIPAP overnight with symptomatic improvement.   - Continue with home oxygen   -Continue CPAP at night     #Foot osteomyelitis  Recently admitted to [facility] and found to have OM of R foot +pseudomonas, d/c'ed on IV zosyn through 05/10. Repeat XR foot with 3 views which showed no tracking gas collections or gross radiographic evidence for OM. Podiatry consulted.   - Continue IV zosyn 4.5g q12h until 5/10  - Discharge dressing instructions: Cleanse wound with normal sailine daily, pat dry with sterile guaze, apply  betadine soaked gauze to wound base, cover with dry sterile gauze, ABD pad, wrap with Kerlix and light ACE bandage.   -F/u in 1 week at following address (any provider):            [facility] ([facility])           [facility] Location            [address], [address]           [place] [address]           [phone]; [phone]           [email or web address]    #Chronic atrial fibrillation  Rate controlled. At home on eliqiuis 5mg BID, coreg 6.25. Cardiology consulted. Coreg held while inpatient due to hypotension associated with dialysis. Started on Lopressor 12.5mg q12h.   - Continue Eliquis 5 mg q12 for A/C  - changed coreg to lopressor 12.5 q12 for BP control during HD.    #Diabetes  H/o DM c/b OM. Last A1c 5.0 (03/2024), Dx at age 34 y/o, on insulin at home which he self titrates  - Continue with home regimen     #PAD (peripheral artery disease)  H/o severe PAD, s/p R LE angioplasty in 1/2024 with balloon angioplasty in proximal AT and below the knee popliteal with 1 conventional and 1 drug eluting stent. At home on plavix 75 mg qd  - Continue with home meds     #H/o of HFrEF with severe aortic stenosis  Now s/p TAVR 03/2022 and still with persistent LV dysfunction. Followed by Dr. Aldrich at [facility]. Appears to be in exacerbation overload on exam with bibasilar crackles and peripheral edema on admission improved at time of discharge. Home meds on lasix prn, coreg and eliquis.  - TTE 2/2021: Moderate LV dilatation with EF 35% with RWA, bicuspid aortic valve severe LFLG aortic stenosis (MG 50, CEDRIC 0.68), dilated ascending aorta 4.5cm  - TTE 7/2022: Normal LV size and thickness, s/p TAVR (peak velocity 2.2 m/s, DVI 0.43) with trace AR, normal RV function, dilated ascending aorta 4.8cm  - TTE 4/2024: sev reduced LVSF, LE EF 30%, grade II DD, trace AR/MR, global hypokinesis.  - f/u Cardiology recs regarding GDMT    #Three-vessel CAD  H/o of obstructive CAD - possibly offered CABG prior to TAVR which was ultimately deferred. Presenting with troponemia likely iso demand ischemia with CAD and acute on chronic renal failure  [facility] 12/2021: 80-90% LPL, 70-80% OM1 and D1, 60-70% distal RCA  EKG 4/29/24: NSR with LBBB, LVH, ST-T changes due to strain  - f/u Cardiology recs     #Normocytic anemia  Hgb on admission 11.2, MCV 91.4. Currently no signs of active bleeding (no hematochezia, melena, hemoptysis, hematuria), likely 2/2 AoCD and JESSICA. S/p IV iron x3 days.   - Start AGUS     Patient was discharged to: (home/[facility]/acute rehab/hospice, etc, and with what services – home health PT/RN? Home O2?)    New medications:   Changes to old medications:  Medications that were stopped:    Items to follow up as outpatient: f/u PCP, f/u cardiology, f/u podiatry     Physical exam at the time of discharge:  General: alert, in no acute distress, +BiPAP in place   HEENT: NC/AT; PERRL, anicteric sclera; poor dentition   Neck: supple, wide circumference   Cardiovascular: +S1/S2, RRR  Respiratory: CTA B/L   Gastrointestinal: obese, soft, NT/ND; +BSx4, +ostomy with brown output   Extremities: WWP; 2+ LE edema to the knee, R foot wrapped and with dressing  Neurological: AAOx3; no focal deficits         #Discharge: do not delete    BRITTANY JOHNSTON is a 67 y/o M w/ PMhx of unrevascularized 3V CAD (RCA, Lcx, LAD), aortic stenosis s/p TAVR 3/2022, PAD s/p angioplasty 1/2023, HFrEF (EF 35-40%), Afib on Eliquis, AAA, CKD 5 (baseline sCr 3.9), colon cancer s/p colostomy, IDDM2, with recent admission for OM of R foot who presents with worsening dyspnea as advised by nephrologist (Dr. Montesinos), admitted for urgent initiation of HD.    Hospital course (by problem):     #ESRD (end stage renal disease)  Known h/o CKD Stage 5, followed by Dr. Montesinos. UOP 10 cc at baseline. Now with worsening SOB and acidosis requiring HD. On admission, labs showed mixed metabolic acidosis with compensated respiratory alkalosis. Nephrology consulted. Temporary femoral cath placed 04/29. S/p 3 dialysis sessions with femoral Cath (HD #1 04/29 1L, HD #2 04/30 1.5L, HD #3 05/01 2L). IR-guided tunneled dialysis catheter placed 05/02.   -discharge to dialysis center      #Acute respiratory failure with hypoxia  Patient with worsening SOB over the last weeks to months, likely multifactorial iso volume overload 2/2 CHF (off GDMT), acute on chronic renal failure, bstructive CAD, COPD/ZAYDA, R-sided diaphragmatic elevation. Initially presenting on NRB (home O2 NC2-4L). Additionally with worsening respiratory alkalosis compensating for mixed metabolic acidosis 2/2 ARF. BNP > 7000. CXR with vascular congestion bilaterally. S/p 100 mg IV lasix x1. Treated with 4L NC and BIPAP overnight with symptomatic improvement.   - Continue with home oxygen   -Continue CPAP at night     #Foot osteomyelitis  Recently admitted to [facility] and found to have OM of R foot +pseudomonas, d/c'ed on IV zosyn through 05/10. Repeat XR foot with 3 views which showed no tracking gas collections or gross radiographic evidence for OM. Podiatry consulted.   - Continue IV zosyn 4.5g q12h until 5/10  - Discharge dressing instructions: Cleanse wound with normal sailine daily, pat dry with sterile guaze, apply  betadine soaked gauze to wound base, cover with dry sterile gauze, ABD pad, wrap with Kerlix and light ACE bandage.   -F/u in 1 week at following address (any provider):            [facility] ([facility])           [facility] Location            [address], [address]           [place] [address]           [phone]; [phone]           [email or web address]    #Chronic atrial fibrillation  Rate controlled. At home on eliqiuis 5mg BID, coreg 6.25. Cardiology consulted. Coreg held while inpatient due to hypotension associated with dialysis. Started on Lopressor 12.5mg q12h.   - Continue Eliquis 5 mg q12 for A/C  - changed coreg to lopressor 12.5 q12 for BP control during HD.    #Diabetes  H/o DM c/b OM. Last A1c 5.0 (03/2024), Dx at age 34 y/o, on insulin at home which he self titrates  - Continue with home regimen     #PAD (peripheral artery disease)  H/o severe PAD, s/p R LE angioplasty in 1/2024 with balloon angioplasty in proximal AT and below the knee popliteal with 1 conventional and 1 drug eluting stent. At home on plavix 75 mg qd  - Continue with home meds     #H/o of HFrEF with severe aortic stenosis  Now s/p TAVR 03/2022 and still with persistent LV dysfunction. Followed by Dr. Aldrich at [facility]. Appears to be in exacerbation overload on exam with bibasilar crackles and peripheral edema on admission improved at time of discharge. Home meds on lasix prn, coreg and eliquis.  - TTE 2/2021: Moderate LV dilatation with EF 35% with RWA, bicuspid aortic valve severe LFLG aortic stenosis (MG 50, CEDRIC 0.68), dilated ascending aorta 4.5cm  - TTE 7/2022: Normal LV size and thickness, s/p TAVR (peak velocity 2.2 m/s, DVI 0.43) with trace AR, normal RV function, dilated ascending aorta 4.8cm  - TTE 4/2024: sev reduced LVSF, LE EF 30%, grade II DD, trace AR/MR, global hypokinesis.  - f/u Cardiology recs regarding GDMT    #Three-vessel CAD  H/o of obstructive CAD - possibly offered CABG prior to TAVR which was ultimately deferred. Presenting with troponemia likely iso demand ischemia with CAD and acute on chronic renal failure  [facility] 12/2021: 80-90% LPL, 70-80% OM1 and D1, 60-70% distal RCA  EKG 4/29/24: NSR with LBBB, LVH, ST-T changes due to strain  - f/u Cardiology recs     #Normocytic anemia  Hgb on admission 11.2, MCV 91.4. Currently no signs of active bleeding (no hematochezia, melena, hemoptysis, hematuria), likely 2/2 AoCD and JESSICA. S/p IV iron x3 days.   - Start AGUS     Patient was discharged to home    New medications:   Changes to old medications:  Medications that were stopped:    Items to follow up as outpatient: f/u PCP, f/u cardiology, f/u podiatry     Physical exam at the time of discharge:  General: alert, in no acute distress, +BiPAP in place   HEENT: NC/AT; PERRL, anicteric sclera; poor dentition   Neck: supple, wide circumference   Cardiovascular: +S1/S2, RRR  Respiratory: CTA B/L   Gastrointestinal: obese, soft, NT/ND; +BSx4, +ostomy with brown output   Extremities: WWP; 2+ LE edema to the knee, R foot wrapped and with dressing  Neurological: AAOx3; no focal deficits         #Discharge: do not delete    BRITTANY JOHNSTON is a 69 y/o M w/ PMhx of unrevascularized 3V CAD (RCA, Lcx, LAD), aortic stenosis s/p TAVR 3/2022, PAD s/p angioplasty 1/2023, HFrEF (EF 35-40%), Afib on Eliquis, AAA, CKD 5 (baseline sCr 3.9), colon cancer s/p colostomy, IDDM2, with recent admission for OM of R foot who presents with worsening dyspnea as advised by nephrologist (Dr. Montesinos), admitted for urgent initiation of HD.    Hospital course (by problem):     #ESRD (end stage renal disease)  Known h/o CKD Stage 5, followed by Dr. Montesinos. UOP 10 cc at baseline. Now with worsening SOB and acidosis requiring HD. On admission, labs showed mixed metabolic acidosis with compensated respiratory alkalosis. Nephrology consulted. Temporary femoral cath placed 04/29. S/p 3 dialysis sessions with femoral Cath (HD #1 04/29 1L, HD #2 04/30 1.5L, HD #3 05/01 2L). IR-guided tunneled dialysis catheter placed 05/02.   -discharge to dialysis center      #Acute respiratory failure with hypoxia  Patient with worsening SOB over the last weeks to months, likely multifactorial iso volume overload 2/2 CHF (off GDMT), acute on chronic renal failure, bstructive CAD, COPD/ZAYDA, R-sided diaphragmatic elevation. Initially presenting on NRB (home O2 NC2-4L). Additionally with worsening respiratory alkalosis compensating for mixed metabolic acidosis 2/2 ARF. BNP > 7000. CXR with vascular congestion bilaterally. S/p 100 mg IV lasix x1. Treated with 4L NC and BIPAP overnight with symptomatic improvement.   - Continue with home oxygen   -Continue CPAP at night     #Foot osteomyelitis  Recently admitted to [facility] and found to have OM of R foot +pseudomonas, d/c'ed on IV zosyn through 05/10. Repeat XR foot with 3 views which showed no tracking gas collections or gross radiographic evidence for OM. Podiatry consulted.   - Continue IV zosyn 4.5g q12h until 5/10  - Discharge dressing instructions: Cleanse wound with normal sailine daily, pat dry with sterile guaze, apply  betadine soaked gauze to wound base, cover with dry sterile gauze, ABD pad, wrap with Kerlix and light ACE bandage.   -F/u in 1 week at following address (any provider):            [facility] ([facility])           [facility] Location            [address], [address]           [place] [address]           [phone]; [phone]           [email or web address]    #Chronic atrial fibrillation  Rate controlled. At home on eliqiuis 5mg BID, coreg 6.25. Cardiology consulted. Coreg held while inpatient due to hypotension associated with dialysis. Started on Lopressor 12.5mg q12h.   - Continue Eliquis 5 mg q12 for A/C  - changed coreg to lopressor 12.5 q12 for BP control during HD.    #Diabetes  H/o DM c/b OM. Last A1c 5.0 (03/2024), Dx at age 34 y/o, on insulin at home which he self titrates  - Continue with home regimen     #PAD (peripheral artery disease)  H/o severe PAD, s/p R LE angioplasty in 1/2024 with balloon angioplasty in proximal AT and below the knee popliteal with 1 conventional and 1 drug eluting stent. At home on plavix 75 mg qd  - Continue with home meds     #H/o of HFrEF with severe aortic stenosis  Now s/p TAVR 03/2022 and still with persistent LV dysfunction. Followed by Dr. Aldrich at [facility]. Appears to be in exacerbation overload on exam with bibasilar crackles and peripheral edema on admission improved at time of discharge. Home meds on lasix prn, coreg and eliquis.  - TTE 2/2021: Moderate LV dilatation with EF 35% with RWA, bicuspid aortic valve severe LFLG aortic stenosis (MG 50, CEDRIC 0.68), dilated ascending aorta 4.5cm  - TTE 7/2022: Normal LV size and thickness, s/p TAVR (peak velocity 2.2 m/s, DVI 0.43) with trace AR, normal RV function, dilated ascending aorta 4.8cm  - TTE 4/2024: sev reduced LVSF, LE EF 30%, grade II DD, trace AR/MR, global hypokinesis.  - f/u Cardiology recs regarding GDMT    #Three-vessel CAD  H/o of obstructive CAD - possibly offered CABG prior to TAVR which was ultimately deferred. Presenting with troponemia likely iso demand ischemia with CAD and acute on chronic renal failure  [facility] 12/2021: 80-90% LPL, 70-80% OM1 and D1, 60-70% distal RCA  EKG 4/29/24: NSR with LBBB, LVH, ST-T changes due to strain  - f/u Cardiology recs     #Normocytic anemia  Hgb on admission 11.2, MCV 91.4. Currently no signs of active bleeding (no hematochezia, melena, hemoptysis, hematuria), likely 2/2 AoCD and JESSICA. S/p IV iron x3 days.   - Start AGUS     Patient was discharged to home    New medications: sevelamer 800mg TID and Vit D 2,000U qd  Changes to old medications: none  Medications that were stopped: none    Items to follow up as outpatient: f/u PCP, f/u cardiology, f/u podiatry     Physical exam at the time of discharge:  General: alert, in no acute distress, +BiPAP in place   HEENT: NC/AT; PERRL, anicteric sclera; poor dentition   Neck: supple, wide circumference   Cardiovascular: +S1/S2, RRR  Respiratory: CTA B/L   Gastrointestinal: obese, soft, NT/ND; +BSx4, +ostomy with brown output   Extremities: WWP; 2+ LE edema to the knee, R foot wrapped and with dressing  Neurological: AAOx3; no focal deficits         #Discharge: do not delete    BRITTANY JOHNSTON is a 67 y/o M w/ PMhx of unrevascularized 3V CAD (RCA, Lcx, LAD), aortic stenosis s/p TAVR 3/2022, PAD s/p angioplasty 1/2023, HFrEF (EF 35-40%), Afib on Eliquis, AAA, CKD 5 (baseline sCr 3.9), colon cancer s/p colostomy, IDDM2, with recent admission for OM of R foot who presents with worsening dyspnea as advised by nephrologist (Dr. Montesinos), admitted for urgent initiation of HD.    Hospital course (by problem):     #ESRD (end stage renal disease)  Known h/o CKD Stage 5, followed by Dr. Montesinos. UOP 10 cc at baseline. Now with worsening SOB and acidosis requiring HD. On admission, labs showed mixed metabolic acidosis with compensated respiratory alkalosis. Nephrology consulted. Temporary femoral cath placed 04/29. S/p 3 dialysis sessions with femoral Cath (HD #1 04/29 1L, HD #2 04/30 1.5L, HD #3 05/01 2L). IR-guided tunneled dialysis catheter placed 05/02.   -discharge to dialysis center      #Acute respiratory failure with hypoxia  Patient with worsening SOB over the last weeks to months, likely multifactorial iso volume overload 2/2 CHF (off GDMT), acute on chronic renal failure, bstructive CAD, COPD/ZAYDA, R-sided diaphragmatic elevation. Initially presenting on NRB (home O2 NC2-4L). Additionally with worsening respiratory alkalosis compensating for mixed metabolic acidosis 2/2 ARF. BNP > 7000. CXR with vascular congestion bilaterally. S/p 100 mg IV lasix x1. Treated with 4L NC and BIPAP overnight with symptomatic improvement.   - Continue with home oxygen   -Continue CPAP at night     #Foot osteomyelitis  Recently admitted to [facility] and found to have OM of R foot +pseudomonas, d/c'ed on IV zosyn through 05/10. Repeat XR foot with 3 views which showed no tracking gas collections or gross radiographic evidence for OM. Podiatry consulted.   - Continue IV zosyn 4.5g q12h until 5/10  - Discharge dressing instructions: Cleanse wound with normal sailine daily, pat dry with sterile guaze, apply  betadine soaked gauze to wound base, cover with dry sterile gauze, ABD pad, wrap with Kerlix and light ACE bandage.   -F/u in 1 week at following address (any provider):            [facility] ([facility]           [facility] Location            [address], [address]           [place] [address]           [phone]; [phone]           [email or web address]    #Chronic atrial fibrillation  Rate controlled. At home on eliqiuis 5mg BID, coreg 6.25. Cardiology consulted. Coreg held while inpatient due to hypotension associated with dialysis. Started on Lopressor 12.5mg q12h.   - Continue Eliquis 5 mg q12 for A/C  - changed coreg to lopressor 12.5 q12 for BP control during HD.    #Diabetes  H/o DM c/b OM. Last A1c 5.0 (03/2024), Dx at age 34 y/o, on insulin at home which he self titrates  - Continue with home regimen     #PAD (peripheral artery disease)  H/o severe PAD, s/p R LE angioplasty in 1/2024 with balloon angioplasty in proximal AT and below the knee popliteal with 1 conventional and 1 drug eluting stent. At home on plavix 75 mg qd  - Continue with home meds     #H/o of HFrEF with severe aortic stenosis  Now s/p TAVR 03/2022 and still with persistent LV dysfunction. Followed by Dr. Aldrich at [facility]. Appears to be in exacerbation overload on exam with bibasilar crackles and peripheral edema on admission improved at time of discharge. Home meds on lasix prn, coreg and eliquis.  - TTE 2/2021: Moderate LV dilatation with EF 35% with RWA, bicuspid aortic valve severe LFLG aortic stenosis (MG 50, CEDRIC 0.68), dilated ascending aorta 4.5cm  - TTE 7/2022: Normal LV size and thickness, s/p TAVR (peak velocity 2.2 m/s, DVI 0.43) with trace AR, normal RV function, dilated ascending aorta 4.8cm  - TTE 4/2024: sev reduced LVSF, LE EF 30%, grade II DD, trace AR/MR, global hypokinesis.  - f/u Cardiology recs regarding GDMT    #Three-vessel CAD  H/o of obstructive CAD - possibly offered CABG prior to TAVR which was ultimately deferred. Presenting with troponemia likely iso demand ischemia with CAD and acute on chronic renal failure  [facility] 12/2021: 80-90% LPL, 70-80% OM1 and D1, 60-70% distal RCA  EKG 4/29/24: NSR with LBBB, LVH, ST-T changes due to strain  - f/u Cardiology recs     #Normocytic anemia  Hgb on admission 11.2, MCV 91.4. Currently no signs of active bleeding (no hematochezia, melena, hemoptysis, hematuria), likely 2/2 AoCD and JESSICA. S/p IV iron x3 days.   - Start AGUS     Patient was discharged to home    New medications: sevelamer 800mg TID and Vit D 2,000U qd  Changes to old medications: none  Medications that were stopped: none    Items to follow up as outpatient: f/u PCP, f/u cardiology, f/u podiatry, f/u with renal    Physical exam at the time of discharge:  General: alert, in no acute distress, +BiPAP in place   HEENT: NC/AT; PERRL, anicteric sclera; poor dentition   Neck: supple, wide circumference   Cardiovascular: +S1/S2, RRR  Respiratory: CTA B/L   Gastrointestinal: obese, soft, NT/ND; +BSx4, +ostomy with brown output   Extremities: WWP; 2+ LE edema to the knee, R foot wrapped and with dressing  Neurological: AAOx3; no focal deficits

## 2024-05-01 NOTE — DISCHARGE NOTE PROVIDER - NSDCCPTREATMENT_GEN_ALL_CORE_FT
PRINCIPAL PROCEDURE  Procedure: Insertion, catheter, temporary, for dialysis  Findings and Treatment:       SECONDARY PROCEDURE  Procedure: XR chest, 1 view  Findings and Treatment:   < end of copied text >  INTERPRETATION:  Clinical history reason for exam: Shortness of breath.  Frontal chest.  COMPARISON: March 29, 2024.  Findings/  impression: Left PICC line tip at the superior vena cava junction..   Cardiomegaly/cardiac magnification, TAVR. Left basilar focal atelectasis,   elevation of the left hemidiaphragm stomach distention. Stable bony   structures.< from: Xray Chest 1 View- PORTABLE-Urgent (04.29.24 @ 00:49) >      Procedure: XR foot, AP, lateral and oblique views  Findings and Treatment:   < end of copied text >  INTERPRETATION:  CLINICAL INDICATION: follow-up partial 5th ray amputation  EXAM:  Frontal oblique lateral right foot from 4/29/2024 at 1054. Compared to   prior study from 3/29/2024.  IMPRESSION:  Redemonstrated partial 5th ray amputation defect through mid metatarsal   shaft. Sharp smooth corticated appearing stump margin with preserved   overlying soft tissue coverage. No tracking gas collections or gross   radiographic evidence for osteomyelitis.  Remainder of foot unchanged including thick protuberant plantar and   posterior calcaneal enthesophytes, hammertoe deformities, generalized   osteopenia, and vascular calcifications.< from: Xray Foot AP + Lateral + Oblique, Right (04.29.24 @ 10:54) >

## 2024-05-01 NOTE — PROGRESS NOTE ADULT - PROBLEM SELECTOR PLAN 2
known h/o CKD Stage 5, followed by Dr. Montesinos. UOP 10 cc at baseline. Now with worsening SOB and acidosis requiring HD. On admission, labs showed mixed metabolic acidosis with compensated respiratory alkalosis.   - s/p 100 mg IV lasix with minimal UOP  - nephrology following, recommendations appreciated   - underwent dialysis on 4/29 with 1.1L removed (goal was 2L but patient hypotensive)  - continue with HD as per neprology with temp femoral line  - planned for TDC with IR in AM  - started on IVFe for supplementation to be given with HD known h/o CKD Stage 5, followed by Dr. Montesinos. UOP 10 cc at baseline. Now with worsening SOB and acidosis requiring HD. On admission, labs showed mixed metabolic acidosis with compensated respiratory alkalosis. Nephrology consulted. Temporary femoral cath placed 04/29. S/p 2 dialysis sessions.   - nephrology following, recommendations appreciated   - plan for third dialysis session today   - plan for TDC placement with IR tomorrow  - NPO after midnight   - continue with HD as per neprology with temp femoral line  - started on IVFe for supplementation to be given with HD

## 2024-05-01 NOTE — DISCHARGE NOTE PROVIDER - CARE PROVIDER_API CALL
Michelle [facility] [place]  Nephrology  [address]  [place] [address]  Phone: [phone]  Fax: [phone]  Follow Up Time:

## 2024-05-01 NOTE — DISCHARGE NOTE PROVIDER - ATTENDING DISCHARGE PHYSICAL EXAMINATION:
Physical exam at the time of discharge:  General: alert, in no acute distress, +BiPAP in place   HEENT: NC/AT; PERRL, anicteric sclera; poor dentition   Neck: supple, wide circumference   Cardiovascular: +S1/S2, RRR  Respiratory: CTA B/L   Gastrointestinal: obese, soft, NT/ND; +BSx4, +ostomy with brown output   Extremities: WWP; 2+ LE edema to the knee, R foot wrapped and with dressing  Neurological: AAOx3; no focal deficits

## 2024-05-01 NOTE — PROGRESS NOTE ADULT - ASSESSMENT
68Y M now ESRD, tolerating tx, with overall clinical improvement, seen and evaluated on HD for 3rd tx, still appears volume overloaded aim for 2.5L UF with plan for TDC 5/2 with IR     ESRD   - 3rd HD session today as below, with gradual improvement in electrolytes   Hemodialysis Treatment.:     Schedule: Once, Modality: Hemodialysis, Access: Femoral Central Venous Catheter    Dialyzer: Optiflux [id number], Time: 180 Min    Blood Flow: 400 mL/Min , Dialysate Flow: 500 mL/Min, Dialysate Temp: 36.5, Tubinmm (Adult)    Target Fluid Removal: 2.5Liters    Dialysate Electrolytes (mEq/L): Potassium 3, Calcium 2.5, Sodium 138, Bicarbonate 35   - Follow up quantiferon  - Renal diet, fluid restriction <1.5L/day  - Strict I&O, daily standing weights  - Social work consult to arrange outpatient HD  -Reassess 2 for additional UF session       Anemia  - Hgb below goal  - Tsat 9%, add IV iron 300mg x3 (2nd tx today)   - Add AGUS after iron loading    Access:  Plan for TDC 5/2 with IR please ensure patient NPO   Please remove left temp fem cath after dialysis today     HTN:  BP stable  UF with HD as tolerated   Continue with BB, agree with metoprolol     MBD   Calcium: 8.5  Phos: 5.9  Follow up PTH, Vitamin D   Hold phos binders for now

## 2024-05-02 ENCOUNTER — LABORATORY RESULT (OUTPATIENT)
Age: 69
End: 2024-05-02

## 2024-05-02 ENCOUNTER — TRANSCRIPTION ENCOUNTER (OUTPATIENT)
Age: 69
End: 2024-05-02

## 2024-05-02 VITALS — HEART RATE: 62 BPM | DIASTOLIC BLOOD PRESSURE: 73 MMHG | SYSTOLIC BLOOD PRESSURE: 118 MMHG

## 2024-05-02 LAB
ANION GAP SERPL CALC-SCNC: 16 MMOL/L — SIGNIFICANT CHANGE UP (ref 5–17)
APTT BLD: 45.3 SEC — HIGH (ref 24.5–35.6)
BLD GP AB SCN SERPL QL: NEGATIVE — SIGNIFICANT CHANGE UP
BUN SERPL-MCNC: 33 MG/DL — HIGH (ref 7–23)
CALCIUM SERPL-MCNC: 8.7 MG/DL — SIGNIFICANT CHANGE UP (ref 8.4–10.5)
CHLORIDE SERPL-SCNC: 97 MMOL/L — SIGNIFICANT CHANGE UP (ref 96–108)
CO2 SERPL-SCNC: 20 MMOL/L — LOW (ref 22–31)
CREAT SERPL-MCNC: 5.21 MG/DL — HIGH (ref 0.5–1.3)
EGFR: 11 ML/MIN/1.73M2 — LOW
GAMMA INTERFERON BACKGROUND BLD IA-ACNC: 0.08 IU/ML — SIGNIFICANT CHANGE UP
GLUCOSE BLDC GLUCOMTR-MCNC: 101 MG/DL — HIGH (ref 70–99)
GLUCOSE BLDC GLUCOMTR-MCNC: 102 MG/DL — HIGH (ref 70–99)
GLUCOSE BLDC GLUCOMTR-MCNC: 94 MG/DL — SIGNIFICANT CHANGE UP (ref 70–99)
GLUCOSE SERPL-MCNC: 99 MG/DL — SIGNIFICANT CHANGE UP (ref 70–99)
HCT VFR BLD CALC: 25.4 % — LOW (ref 39–50)
HGB BLD-MCNC: 7.9 G/DL — LOW (ref 13–17)
INR BLD: 1.3 — HIGH (ref 0.85–1.18)
M TB IFN-G BLD-IMP: NEGATIVE — SIGNIFICANT CHANGE UP
M TB IFN-G CD4+ BCKGRND COR BLD-ACNC: 0 IU/ML — SIGNIFICANT CHANGE UP
M TB IFN-G CD4+CD8+ BCKGRND COR BLD-ACNC: 0 IU/ML — SIGNIFICANT CHANGE UP
MAGNESIUM SERPL-MCNC: 1.8 MG/DL — SIGNIFICANT CHANGE UP (ref 1.6–2.6)
MCHC RBC-ENTMCNC: 27.4 PG — SIGNIFICANT CHANGE UP (ref 27–34)
MCHC RBC-ENTMCNC: 31.1 GM/DL — LOW (ref 32–36)
MCV RBC AUTO: 88.2 FL — SIGNIFICANT CHANGE UP (ref 80–100)
NRBC # BLD: 0 /100 WBCS — SIGNIFICANT CHANGE UP (ref 0–0)
PHOSPHATE SERPL-MCNC: 5.1 MG/DL — HIGH (ref 2.5–4.5)
PLATELET # BLD AUTO: 247 K/UL — SIGNIFICANT CHANGE UP (ref 150–400)
POTASSIUM SERPL-MCNC: 3.5 MMOL/L — SIGNIFICANT CHANGE UP (ref 3.5–5.3)
POTASSIUM SERPL-SCNC: 3.5 MMOL/L — SIGNIFICANT CHANGE UP (ref 3.5–5.3)
PROTHROM AB SERPL-ACNC: 14.7 SEC — HIGH (ref 9.5–13)
QUANT TB PLUS MITOGEN MINUS NIL: >10 IU/ML — SIGNIFICANT CHANGE UP
RBC # BLD: 2.88 M/UL — LOW (ref 4.2–5.8)
RBC # FLD: 17.4 % — HIGH (ref 10.3–14.5)
RH IG SCN BLD-IMP: POSITIVE — SIGNIFICANT CHANGE UP
SODIUM SERPL-SCNC: 133 MMOL/L — LOW (ref 135–145)
WBC # BLD: 8.56 K/UL — SIGNIFICANT CHANGE UP (ref 3.8–10.5)
WBC # FLD AUTO: 8.56 K/UL — SIGNIFICANT CHANGE UP (ref 3.8–10.5)

## 2024-05-02 PROCEDURE — 86900 BLOOD TYPING SEROLOGIC ABO: CPT

## 2024-05-02 PROCEDURE — 99291 CRITICAL CARE FIRST HOUR: CPT | Mod: 25

## 2024-05-02 PROCEDURE — 82728 ASSAY OF FERRITIN: CPT

## 2024-05-02 PROCEDURE — 36556 INSERT NON-TUNNEL CV CATH: CPT

## 2024-05-02 PROCEDURE — 93005 ELECTROCARDIOGRAM TRACING: CPT

## 2024-05-02 PROCEDURE — 85025 COMPLETE CBC W/AUTO DIFF WBC: CPT

## 2024-05-02 PROCEDURE — 80074 ACUTE HEPATITIS PANEL: CPT

## 2024-05-02 PROCEDURE — 96376 TX/PRO/DX INJ SAME DRUG ADON: CPT

## 2024-05-02 PROCEDURE — 99239 HOSP IP/OBS DSCHRG MGMT >30: CPT

## 2024-05-02 PROCEDURE — 84100 ASSAY OF PHOSPHORUS: CPT

## 2024-05-02 PROCEDURE — 83550 IRON BINDING TEST: CPT

## 2024-05-02 PROCEDURE — 85730 THROMBOPLASTIN TIME PARTIAL: CPT

## 2024-05-02 PROCEDURE — 90935 HEMODIALYSIS ONE EVALUATION: CPT

## 2024-05-02 PROCEDURE — 84300 ASSAY OF URINE SODIUM: CPT

## 2024-05-02 PROCEDURE — 86901 BLOOD TYPING SEROLOGIC RH(D): CPT

## 2024-05-02 PROCEDURE — 82550 ASSAY OF CK (CPK): CPT

## 2024-05-02 PROCEDURE — 82553 CREATINE MB FRACTION: CPT

## 2024-05-02 PROCEDURE — 83540 ASSAY OF IRON: CPT

## 2024-05-02 PROCEDURE — C8929: CPT

## 2024-05-02 PROCEDURE — 36558 INSERT TUNNELED CV CATH: CPT | Mod: RT

## 2024-05-02 PROCEDURE — 80053 COMPREHEN METABOLIC PANEL: CPT

## 2024-05-02 PROCEDURE — C1750: CPT

## 2024-05-02 PROCEDURE — 84466 ASSAY OF TRANSFERRIN: CPT

## 2024-05-02 PROCEDURE — 76937 US GUIDE VASCULAR ACCESS: CPT

## 2024-05-02 PROCEDURE — 94660 CPAP INITIATION&MGMT: CPT

## 2024-05-02 PROCEDURE — 73630 X-RAY EXAM OF FOOT: CPT

## 2024-05-02 PROCEDURE — 76937 US GUIDE VASCULAR ACCESS: CPT | Mod: 26

## 2024-05-02 PROCEDURE — 83735 ASSAY OF MAGNESIUM: CPT

## 2024-05-02 PROCEDURE — 85610 PROTHROMBIN TIME: CPT

## 2024-05-02 PROCEDURE — 84156 ASSAY OF PROTEIN URINE: CPT

## 2024-05-02 PROCEDURE — 80048 BASIC METABOLIC PNL TOTAL CA: CPT

## 2024-05-02 PROCEDURE — C1752: CPT

## 2024-05-02 PROCEDURE — 84484 ASSAY OF TROPONIN QUANT: CPT

## 2024-05-02 PROCEDURE — 36415 COLL VENOUS BLD VENIPUNCTURE: CPT

## 2024-05-02 PROCEDURE — 83935 ASSAY OF URINE OSMOLALITY: CPT

## 2024-05-02 PROCEDURE — 86704 HEP B CORE ANTIBODY TOTAL: CPT

## 2024-05-02 PROCEDURE — 82310 ASSAY OF CALCIUM: CPT

## 2024-05-02 PROCEDURE — 83880 ASSAY OF NATRIURETIC PEPTIDE: CPT

## 2024-05-02 PROCEDURE — C1887: CPT

## 2024-05-02 PROCEDURE — 86140 C-REACTIVE PROTEIN: CPT

## 2024-05-02 PROCEDURE — 85652 RBC SED RATE AUTOMATED: CPT

## 2024-05-02 PROCEDURE — 77001 FLUOROGUIDE FOR VEIN DEVICE: CPT

## 2024-05-02 PROCEDURE — 84133 ASSAY OF URINE POTASSIUM: CPT

## 2024-05-02 PROCEDURE — 71045 X-RAY EXAM CHEST 1 VIEW: CPT

## 2024-05-02 PROCEDURE — 83970 ASSAY OF PARATHORMONE: CPT

## 2024-05-02 PROCEDURE — 87637 SARSCOV2&INF A&B&RSV AMP PRB: CPT

## 2024-05-02 PROCEDURE — 82962 GLUCOSE BLOOD TEST: CPT

## 2024-05-02 PROCEDURE — 84540 ASSAY OF URINE/UREA-N: CPT

## 2024-05-02 PROCEDURE — 86850 RBC ANTIBODY SCREEN: CPT

## 2024-05-02 PROCEDURE — 86480 TB TEST CELL IMMUN MEASURE: CPT

## 2024-05-02 PROCEDURE — 99232 SBSQ HOSP IP/OBS MODERATE 35: CPT

## 2024-05-02 PROCEDURE — C1769: CPT

## 2024-05-02 PROCEDURE — 85027 COMPLETE CBC AUTOMATED: CPT

## 2024-05-02 PROCEDURE — 82652 VIT D 1 25-DIHYDROXY: CPT

## 2024-05-02 PROCEDURE — 82570 ASSAY OF URINE CREATININE: CPT

## 2024-05-02 PROCEDURE — 77001 FLUOROGUIDE FOR VEIN DEVICE: CPT | Mod: 26

## 2024-05-02 PROCEDURE — 86706 HEP B SURFACE ANTIBODY: CPT

## 2024-05-02 PROCEDURE — 36558 INSERT TUNNELED CV CATH: CPT

## 2024-05-02 PROCEDURE — 82306 VITAMIN D 25 HYDROXY: CPT

## 2024-05-02 PROCEDURE — 82803 BLOOD GASES ANY COMBINATION: CPT

## 2024-05-02 PROCEDURE — 96374 THER/PROPH/DIAG INJ IV PUSH: CPT

## 2024-05-02 RX ORDER — SEVELAMER CARBONATE 2400 MG/1
1 POWDER, FOR SUSPENSION ORAL
Qty: 90 | Refills: 0
Start: 2024-05-02 | End: 2024-05-31

## 2024-05-02 RX ORDER — LOSARTAN POTASSIUM 100 MG/1
1 TABLET, FILM COATED ORAL
Refills: 0 | DISCHARGE

## 2024-05-02 RX ORDER — SEVELAMER CARBONATE 2400 MG/1
800 POWDER, FOR SUSPENSION ORAL EVERY 8 HOURS
Refills: 0 | Status: DISCONTINUED | OUTPATIENT
Start: 2024-05-02 | End: 2024-05-02

## 2024-05-02 RX ORDER — CHOLECALCIFEROL (VITAMIN D3) 125 MCG
1 CAPSULE ORAL
Qty: 30 | Refills: 0
Start: 2024-05-02 | End: 2024-05-31

## 2024-05-02 RX ORDER — CHOLECALCIFEROL (VITAMIN D3) 125 MCG
2000 CAPSULE ORAL EVERY 24 HOURS
Refills: 0 | Status: DISCONTINUED | OUTPATIENT
Start: 2024-05-02 | End: 2024-05-02

## 2024-05-02 RX ADMIN — Medication 12.5 MILLIGRAM(S): at 17:58

## 2024-05-02 RX ADMIN — PIPERACILLIN AND TAZOBACTAM 25 GRAM(S): 4; .5 INJECTION, POWDER, LYOPHILIZED, FOR SOLUTION INTRAVENOUS at 11:41

## 2024-05-02 RX ADMIN — CHLORHEXIDINE GLUCONATE 1 APPLICATION(S): 213 SOLUTION TOPICAL at 11:41

## 2024-05-02 RX ADMIN — Medication 12.5 MILLIGRAM(S): at 10:15

## 2024-05-02 RX ADMIN — Medication 2000 UNIT(S): at 18:00

## 2024-05-02 RX ADMIN — CLOPIDOGREL BISULFATE 75 MILLIGRAM(S): 75 TABLET, FILM COATED ORAL at 17:59

## 2024-05-02 RX ADMIN — APIXABAN 5 MILLIGRAM(S): 2.5 TABLET, FILM COATED ORAL at 10:15

## 2024-05-02 NOTE — PROGRESS NOTE ADULT - ASSESSMENT
68Y M now ESRD, tolerating tx, with overall clinical improvement, plan for TDC today, outpatient HD center set up completed, scheduled for HD on Friday       ESRD   -Electrolytes stable, acceptable volume status , no indication for HD today   -Next HD as per outpatient   - Renal diet, fluid restriction <1.5L/day  - Strict I&O, daily standing weights    Anemia  - Hgb below goal  - Tsat 9%, add IV iron 300mg x3 (3nd tx today)   - Add AGUS after iron loading    Access:  Plan for TDC 5/2 with IR   Please remove left temp fem cath today   Recommend vascular consult for vein mapping     HTN:  BP stable  UF with HD as tolerated   Continue with BB, agree with metoprolol     MBD   Calcium: 8.7, corrected 9.4  Phos: 5.1  , vitamin D 25: 25  Can start on sevelamer 800mg TID with meals and 2000U vitamin D daily

## 2024-05-02 NOTE — PROGRESS NOTE ADULT - SUBJECTIVE AND OBJECTIVE BOX
INTERVAL EVENTS:    PAST MEDICAL & SURGICAL HISTORY:  COPD with hypoxia    Chronic kidney disease (CKD)    CAD (coronary artery disease)    Type 2 diabetes mellitus    Diabetic foot ulcers    PAD (peripheral artery disease)    Status post amputation of toe        MEDICATIONS  (STANDING):  apixaban 5 milliGRAM(s) Oral every 12 hours  atorvastatin 40 milliGRAM(s) Oral at bedtime  chlorhexidine 2% Cloths 1 Application(s) Topical daily  cholecalciferol 2000 Unit(s) Oral every 24 hours  clopidogrel Tablet 75 milliGRAM(s) Oral daily  dextrose 10% Bolus 125 milliLiter(s) IV Bolus once  dextrose 5%. 1000 milliLiter(s) (50 mL/Hr) IV Continuous <Continuous>  dextrose 5%. 1000 milliLiter(s) (100 mL/Hr) IV Continuous <Continuous>  dextrose 50% Injectable 25 Gram(s) IV Push once  dextrose 50% Injectable 12.5 Gram(s) IV Push once  glucagon  Injectable 1 milliGRAM(s) IntraMuscular once  influenza  Vaccine (HIGH DOSE) 0.7 milliLiter(s) IntraMuscular once  insulin lispro (ADMELOG) corrective regimen sliding scale   SubCutaneous Before meals and at bedtime  metoprolol tartrate 12.5 milliGRAM(s) Oral every 12 hours  piperacillin/tazobactam IVPB.. 4.5 Gram(s) IV Intermittent every 12 hours  sevelamer carbonate 800 milliGRAM(s) Oral every 8 hours    MEDICATIONS  (PRN):  acetaminophen     Tablet .. 650 milliGRAM(s) Oral every 6 hours PRN Mild Pain (1 - 3), Moderate Pain (4 - 6)  dextrose Oral Gel 15 Gram(s) Oral once PRN Blood Glucose LESS THAN 70 milliGRAM(s)/deciliter    T(F): 98.4 (05-02-24 @ 12:22), Max: 98.7 (05-02-24 @ 05:36)  HR: 60 (05-02-24 @ 14:35) (60 - 77)  BP: 92/52 (05-02-24 @ 14:35) (92/52 - 124/71)  BP(mean): 84 (05-02-24 @ 14:35) (67 - 84)  ABP: --  ABP(mean): --  RR: 17 (05-02-24 @ 14:35) (17 - 18)  SpO2: 100% (05-02-24 @ 14:35) (99% - 100%)    I/O Detail 24H    01 May 2024 07:01  -  02 May 2024 07:00  --------------------------------------------------------  IN:  Total IN: 0 mL    OUT:    Other (mL): 2000 mL  Total OUT: 2000 mL    Total NET: -2000 mL          PHYSICAL EXAM:  GEN: NAD  HEENT: EOMI   RESP: CTA b/l  CV: RRR. Normal S1/S2. No m/r/g.  ABD: soft, non-distended  EXT: No edema   NEURO: alert and attentive    LABS:  CBC 05-02-24 @ 05:30                        7.9    8.56  )-----------( 247                   25.4       Hgb trend: 7.9 <-- , 7.5 <-- , 7.2 <--   WBC trend: 8.56 <-- , 8.20 <-- , 7.66 <--       CMP 05-02-24 @ 05:30    133<L>  |  97  |  33<H>  ----------------------------<  99  3.5   |  20<L>  |  5.21<H>    Ca    8.7      05-02-24 @ 05:30  Phos  5.1     05-02  Mg     1.8     05-02    TPro  6.3  /  Alb  3.1<L>  /  TBili  0.3  /  DBili  x   /  AST  15  /  ALT  7<L>  /  AlkPhos  66     05-01      Serum Cr trend: 5.21 <-- , 6.02 <-- , 6.78 <--     PT/INR - ( 02 May 2024 05:30 )   PT: 14.7 sec;   INR: 1.30          PTT - ( 02 May 2024 05:30 ):45.3 sec    Cardiac Markers           STUDIES:           INTERVAL EVENTS: patient comfortable, no acute complaints, getting TDC today     PAST MEDICAL & SURGICAL HISTORY:  COPD with hypoxia    Chronic kidney disease (CKD)    CAD (coronary artery disease)    Type 2 diabetes mellitus    Diabetic foot ulcers    PAD (peripheral artery disease)    Status post amputation of toe        MEDICATIONS  (STANDING):  apixaban 5 milliGRAM(s) Oral every 12 hours  atorvastatin 40 milliGRAM(s) Oral at bedtime  chlorhexidine 2% Cloths 1 Application(s) Topical daily  cholecalciferol 2000 Unit(s) Oral every 24 hours  clopidogrel Tablet 75 milliGRAM(s) Oral daily  dextrose 10% Bolus 125 milliLiter(s) IV Bolus once  dextrose 5%. 1000 milliLiter(s) (50 mL/Hr) IV Continuous <Continuous>  dextrose 5%. 1000 milliLiter(s) (100 mL/Hr) IV Continuous <Continuous>  dextrose 50% Injectable 25 Gram(s) IV Push once  dextrose 50% Injectable 12.5 Gram(s) IV Push once  glucagon  Injectable 1 milliGRAM(s) IntraMuscular once  influenza  Vaccine (HIGH DOSE) 0.7 milliLiter(s) IntraMuscular once  insulin lispro (ADMELOG) corrective regimen sliding scale   SubCutaneous Before meals and at bedtime  metoprolol tartrate 12.5 milliGRAM(s) Oral every 12 hours  piperacillin/tazobactam IVPB.. 4.5 Gram(s) IV Intermittent every 12 hours  sevelamer carbonate 800 milliGRAM(s) Oral every 8 hours    MEDICATIONS  (PRN):  acetaminophen     Tablet .. 650 milliGRAM(s) Oral every 6 hours PRN Mild Pain (1 - 3), Moderate Pain (4 - 6)  dextrose Oral Gel 15 Gram(s) Oral once PRN Blood Glucose LESS THAN 70 milliGRAM(s)/deciliter    T(F): 98.4 (05-02-24 @ 12:22), Max: 98.7 (05-02-24 @ 05:36)  HR: 60 (05-02-24 @ 14:35) (60 - 77)  BP: 92/52 (05-02-24 @ 14:35) (92/52 - 124/71)  BP(mean): 84 (05-02-24 @ 14:35) (67 - 84)  ABP: --  ABP(mean): --  RR: 17 (05-02-24 @ 14:35) (17 - 18)  SpO2: 100% (05-02-24 @ 14:35) (99% - 100%)    I/O Detail 24H    01 May 2024 07:01  -  02 May 2024 07:00  --------------------------------------------------------  IN:  Total IN: 0 mL    OUT:    Other (mL): 2000 mL  Total OUT: 2000 mL    Total NET: -2000 mL          PHYSICAL EXAM:  GEN: NAD  HEENT: EOMI   RESP: CTA b/l  CV: RRR. Normal S1/S2. No m/r/g.  ABD: soft, non-distended  EXT: No edema   NEURO: alert and attentive    LABS:  CBC 05-02-24 @ 05:30                        7.9    8.56  )-----------( 247                   25.4       Hgb trend: 7.9 <-- , 7.5 <-- , 7.2 <--   WBC trend: 8.56 <-- , 8.20 <-- , 7.66 <--       CMP 05-02-24 @ 05:30    133<L>  |  97  |  33<H>  ----------------------------<  99  3.5   |  20<L>  |  5.21<H>    Ca    8.7      05-02-24 @ 05:30  Phos  5.1     05-02  Mg     1.8     05-02    TPro  6.3  /  Alb  3.1<L>  /  TBili  0.3  /  DBili  x   /  AST  15  /  ALT  7<L>  /  AlkPhos  66     05-01      Serum Cr trend: 5.21 <-- , 6.02 <-- , 6.78 <--     PT/INR - ( 02 May 2024 05:30 )   PT: 14.7 sec;   INR: 1.30          PTT - ( 02 May 2024 05:30 ):45.3 sec    Cardiac Markers           STUDIES:

## 2024-05-02 NOTE — PROGRESS NOTE ADULT - PROVIDER SPECIALTY LIST ADULT
Cardiology
Nephrology
Podiatry
Cardiology
Nephrology
Internal Medicine

## 2024-05-02 NOTE — PROGRESS NOTE ADULT - SUBJECTIVE AND OBJECTIVE BOX
Patient is a 68y Male seen and evaluated at bedside. No acute distress overall feels well  Tolerated HD 5/1 with 2L UF   Plan for TDC today with IR, on schedule today will need anesthesia with procedure   No indication for HD today       Meds:    acetaminophen     Tablet .. 650 every 6 hours PRN  apixaban 5 every 12 hours  atorvastatin 40 at bedtime  chlorhexidine 2% Cloths 1 daily  clopidogrel Tablet 75 daily  dextrose 10% Bolus 125 once  dextrose 5%. 1000 <Continuous>  dextrose 5%. 1000 <Continuous>  dextrose 50% Injectable 25 once  dextrose 50% Injectable 12.5 once  dextrose Oral Gel 15 once PRN  glucagon  Injectable 1 once  influenza  Vaccine (HIGH DOSE) 0.7 once  insulin lispro (ADMELOG) corrective regimen sliding scale  Before meals and at bedtime  metoprolol tartrate 12.5 every 12 hours  piperacillin/tazobactam IVPB.. 4.5 every 12 hours      T(C): , Max: 37.1 (05-02-24 @ 05:36)  T(F): , Max: 98.7 (05-02-24 @ 05:36)  HR: 68 (05-02-24 @ 10:14)  BP: 124/71 (05-02-24 @ 10:14)  BP(mean): 84 (05-02-24 @ 05:36)  RR: 17 (05-02-24 @ 05:36)  SpO2: 100% (05-02-24 @ 05:36)  Wt(kg): --    05-01 @ 07:01  -  05-02 @ 07:00  --------------------------------------------------------  IN: 0 mL / OUT: 2000 mL / NET: -2000 mL          Review of Systems:  ROS negative except as per HPI      PHYSICAL EXAM:  GENERAL: NAD laying in bed, on CPAP   NECK: supple, No JVD  CHEST/LUNG: decreased breath sounds   HEART: normal S1S2, RRR  ABDOMEN: Soft, Nontender   EXTREMITIES: 1+ pitting edema b/l LE   NEUROLOGY: AAO x3, no focal neurological deficit  ACCESS:  Left fem temp HD cath c/d/i no signs of infection       LABS:                        7.9    8.56  )-----------( 247      ( 02 May 2024 05:30 )             25.4     05-02    133<L>  |  97  |  33<H>  ----------------------------<  99  3.5   |  20<L>  |  5.21<H>    Ca    8.7      02 May 2024 05:30  Phos  5.1     05-02  Mg     1.8     05-02    TPro  6.3  /  Alb  3.1<L>  /  TBili  0.3  /  DBili  x   /  AST  15  /  ALT  7<L>  /  AlkPhos  66  05-01      PT/INR - ( 02 May 2024 05:30 )   PT: 14.7 sec;   INR: 1.30          PTT - ( 02 May 2024 05:30 )  PTT:45.3 sec  Urinalysis Basic - ( 02 May 2024 05:30 )    Color: x / Appearance: x / SG: x / pH: x  Gluc: 99 mg/dL / Ketone: x  / Bili: x / Urobili: x   Blood: x / Protein: x / Nitrite: x   Leuk Esterase: x / RBC: x / WBC x   Sq Epi: x / Non Sq Epi: x / Bacteria: x            RADIOLOGY & ADDITIONAL STUDIES:

## 2024-05-02 NOTE — PRE-ANESTHESIA EVALUATION ADULT - NSANTHPMHFT_GEN_ALL_CORE
68M PMHx AFib, PAD, DM2 w diabetic foot wounds (sp amputation left toe, on zosyn through 5/10 for tx of OM), COPD, colon cancer (colostomy bag), and CKD5, presenting with worsening shortness of breath, found to be fluid overloaded, w KAT on CKD (Cr of 7.84 increased from baseline 3.9), admitted for urgent initiation of HD now s/p temporary femoral cath placement and 2 dialysis sessions completed. Pending IR-guided TDC placement tomorrow 05/02

## 2024-05-02 NOTE — PROGRESS NOTE ADULT - ATTENDING COMMENTS
69 yo man admitted yesterday with uremic symptoms and SOB- initiated dialysis and says he is feeling "much better" this AM  unable able to get tunneled HD catheter - could not lie flat- has L femoral HD catheter  seen on HD treatment # 2-   tolerating well  2L UF target and will add IVFE  reviewed with family at bedside    h/o HTN, HLD, DM2, AS s/p TAVR, afib on eliquis, copd on 2L home O2 and bipap qhs, colon ca s/p resection w/ colostomy, PAD
seen on HD with Dr Vazquez, agree with above  tolerating rx, VSS  cont HD as planned above -- UF as tolerates to aid pulm status
volume , lytes ok-no HD planned today  for tunneled HD line  dc fem line
Patients is a 67 yo M with unrevascularized 3V CAD (Deemed High risk for CABG due to Renal function), AS s/p TAVR March 2022 at [facility], PAD s/p angioplasty, HFrEF (EF 35-40%), Afib, CKD 5 (baseline sCr 3.9), colon cancer, DM, admitted with worsening dyspnea  with KAT on CKD and volume overload, with plans for urgent dialysis, now s/p Femoral HD cath. Cardiology originally consulted for pre operative risk assessment for tunneled HD catheter placement.    Review of studies:  - EKG 4/29/24: NSR with LBBB, LVH, ST-T changes due to strain  - TTE 4/29/24: dilated LV with EF 30% w global hypokinesis, grade 2 DD, normal RV, s/p TAVR (PV 1.9 m/s) w trace AR  - TTE 7/2022: Normal LV size and thickness, s/p TAVR (peak velocity 2.2 m/s, DVI 0.43) with trace AR, normal RV function, dilated ascending aorta 4.8cm  - TTE 2021 OSH: Moderate LV dilatation with EF 35% with RWA, bicuspid aortic valve severe LFLG aortic stenosis (MG 50, CEDRIC 0.68), dilated ascending aorta 4.5cm  - C 12/2021 OSH : 80-90% LPL, 70-80% OM1 and D1, 60-70% distal RCA    Home medications: Eliquis 5mg BID, Plavix 75mg, Atorvastatin 40mg QD, Coreg 6.25mg BID, No longer on Losartan 25mg QD d/t CKD, SGLT2/MRA deferred d/t CKD    # Coronary Artery Disease  #Troponin elevation  - Patient has known CAD. He underwent ischemic Workup in 12/2021 at the time of TAVR evaluation  - At that time he was evaluated for CABG but deemed high risk due to worsening renal failure. It appears that revascularization via PCI was also deferred pending dialysis initiation   - Since, Patient has followed with Dr. Aldrich (cardiologist at [facility] They deny further ischemic workup via stress test. Patient has been maintained on PLavix and Eliquis  - ROS is negative for anginal symptoms but patient reports NGUYEN and overall dyspnea that has improved since first dialysis session  - Given unrevasularized CAD, ICM, AS and Afib, patient Needs BB therapy. Carvedilol has been held due to borderline BP in order to optimize volume removal. As such would dose patient for lopressor 12.5 mg po BID for the time being. Ultimately patient will be discharged on Coreg or Toprol for GDTM    #HFrEF 2/2 ICM  - Cardiomyopathy likely ischemic in patient with known 3VCAD that has been unrevascularized  - Echo this hospitalization reviewed showing severely reduced systolic function with EF around 30%, G2DD and normal functioning TAVR valve  - GDMT: would switch  to lopressor 12.5mg BID to allow for BP room during dialysis; remainder of GDMT on hold for now  - Will defer ACE/ARB/ARNI; MRA and SGLT2 for now given ESRD  - If BP allows will consider Hydral/Isordil   - Volume management per HD, undergoing today with goal 2L removal. Y    #Afib  - Patient with history of Afib, currently in NSR with PVCx  - Cont with lopressor as above  - Continue Eliquis 5mg BID    Cardiology will cont to follow with you, please call with any questions
Patients is a 67 yo M with unrevascularized 3V CAD (Deemed High risk for CABG due to Renal function), AS s/p TAVR March 2022 at [facility], PAD s/p angioplasty, HFrEF (EF 35-40%), Afib, CKD 5 (baseline sCr 3.9), colon cancer, DM, admitted with worsening dyspnea  with KAT on CKD and volume overload, for urgent dialysis now s/p Tunneled HD Cath. Cardiology originally consulted for pre operative risk assessment for tunneled HD catheter placement.    Review of studies:  - EKG 4/29/24: NSR with LBBB, LVH, ST-T changes due to strain  - TTE 4/29/24: dilated LV with EF 30% w global hypokinesis, grade 2 DD, normal RV, s/p TAVR (PV 1.9 m/s) w trace AR  - TTE 7/2022: Normal LV size and thickness, s/p TAVR (peak velocity 2.2 m/s, DVI 0.43) with trace AR, normal RV function, dilated ascending aorta 4.8cm  - TTE 2021 OSH: Moderate LV dilatation with EF 35% with RWA, bicuspid aortic valve severe LFLG aortic stenosis (MG 50, CEDRIC 0.68), dilated ascending aorta 4.5cm  - C 12/2021 OSH : 80-90% LPL, 70-80% OM1 and D1, 60-70% distal RCA    Home medications: Eliquis 5mg BID, Plavix 75mg, Atorvastatin 40mg QD, Coreg 6.25mg BID, No longer on Losartan 25mg QD d/t CKD, SGLT2/MRA deferred d/t CKD    # Coronary Artery Disease  #Troponin elevation  - Patient has known CAD. He underwent ischemic Workup in 12/2021 at the time of TAVR evaluation  - At that time he was evaluated for CABG but deemed high risk due to worsening renal failure. It appears that revascularization via PCI was also deferred pending dialysis initiation   - Since, Patient has followed with Dr. Aldrich (cardiologist at [facility]). They deny further ischemic workup via stress test. Patient has been maintained on Plavix and Eliquis  - ROS is negative for anginal symptoms but patient reports NGUYEN and overall dyspnea that has improved since first dialysis session  - Given unrevasularized CAD, ICM, AS and Afib, patient Needs BB therapy. Carvedilol has been held/deferred due to borderline BP in order to optimize volume removal. Would cont with lopressor 12.5 mg po BID with plan to DC on Toprol 25 mg po daily starting 5/3 am    #HFrEF 2/2 ICM  - Cardiomyopathy likely ischemic in patient with known 3VCAD that has been unrevascularized  - Echo this hospitalization reviewed showing severely reduced systolic function with EF around 30%, G2DD and normal functioning TAVR valve  - GDMT: Cont lopressor 12.5mg BID with plan for Toprol 25 mg po daily starting 5/3 am  - As patient now officially ESRD, would resume lOsartan 25 mg po daily for GDMT if ok with renal  - If BP allows as an outpatient patient should be considerd for Hydral/Isordil  - Will defer MRA and SGLT2 given ESRD    #Afib  - Patient with history of Afib, currently in NSR with PVCx  - Cont with lopressor as above  - Continue Eliquis 5mg BID    Given Anticipated DC today, please ensure patient has close outpatient follow up with  Dr. Aldrich (cardiologist at [facility]) within 2 weeks of DC
68M PMHx AFib, PAD, DM2 w diabetic foot wounds (sp amputation left toe, on zosyn through 5/10 for tx of OM), COPD, colon cancer (colostomy bag), and CKD5, presenting with worsening shortness of breath, found to be fluid overloaded, w KAT on CKD (Cr of 7.84 increased from baseline 3.9), admitted for urgent initiation of HD.    Labs and imaging reviewed    Problem List  #ESRD  #Colon Cancer  #DM Ulcer with associated OM  #Chronic Afib  #DM2  #Chronic COPD    Plan  -PermaCath today  -HD set up for outpatient, testing all pending  -Continue abx for OM    Rest as above
Pt. seen and examined earlier today; I have read Dr. Villatoro's note, I agree w/ her findings and plan of care as documented; cont. HD per Renal, plan for tunneled dialysis catheter placement tomorrow w/ IR

## 2024-05-02 NOTE — PROGRESS NOTE ADULT - ASSESSMENT
69yo M with unrevascularized 3V CAD, aortic stenosis s/p TAVR March 2022 at [facility], PAD s/p angioplasty, HFrEF (EF 35-40%), Afib, CKD 5 (baseline sCr 3.9), colon cancer, DM, presents with worsening dyspnea as advised by nephrologist (Dr. Montesinos). Patient is admitted with KAT on CKD and volume overload, now s/p tunneled HD catheter placement (4/29) with initiation of urgent dialysis. Cardiology originally consulted for pre operative evaluation, now following for CV management.       Review of studies:  [facility] 12/2021: 80-90% LPL, 70-80% OM1 and D1, 60-70% distal RCA  TTE 2021: Moderate LV dilatation with EF 35% with RWA, bicuspid aortic valve severe LFLG aortic stenosis (MG 50, CEDRIC 0.68), dilated ascending aorta 4.5cm  TTE 7/2022: Normal LV size and thickness, s/p TAVR (peak velocity 2.2 m/s, DVI 0.43) with trace AR, normal RV function, dilated ascending aorta 4.8cm  EKG 4/29/24: NSR with LBBB, LVH, ST-T changes due to strain  TTE 4/29/24: dilated LV with EF 30% w global hypokinesis, grade 2 DD, normal RV, s/p TAVR (PV 1.9 m/s) w trace AR      Home medications: Eliquis 5mg BID, Plavix 75mg, Atorvastatin 40mg QD, Coreg 6.25mg BID, No longer on Losartan 25mg QD d/t CKD, SGLT2/MRA deferred d/t CKD      #3VCAD   - Patient has known underlying 3VCAD that is unrevascularized. Continue home plavix 75mg QD and atorvastatin 40mg QD.    - Patient follows with Dr. Aldrich (cardiologist at [facility]). Per family, patient reportedly was considered for CABG at [facility] however was deemed too high risk. Will need collateral and would not recommend ischemic evaluation during this admission especially in the absence of chest pain, however patient should follow up with his outpatient cardiologist for staged PCI.      #HFrEF  Patient with known HFrEF and has persistent LV dysfunction despite TAVR. Suspect patient has ischemic cardiomyopathy.   TTE noted as above    - GDMT: can transition lopressor 12.5mg BID to toprol 25mg QD upon discharge, can start losartan 25mg QD if okay from renal standpoint as patient is now ESRD; remainder of GDMT on hold for now, can resume as an outpatient with his cardiologist    - Volume management per HD, plan for TDC today        #Afib  Patient with history of Afib, currently in NSR   - Continue Eliquis 5mg BID    #PAD s/p angioplasty   - Plavix and Statin as above     #KAT on CKD  Patient with known CKD, now here with KAT on CKD with progression to ESRD. HD started this admission. Follows with Dr. Montesinos.    - Management per renal, plan for TDC today     Case d/w attending

## 2024-05-02 NOTE — DISCHARGE NOTE NURSING/CASE MANAGEMENT/SOCIAL WORK - PATIENT PORTAL LINK FT
You can access the FollowMyHealth Patient Portal offered by [facility] by registering at the following website: [email or web address]. By joining [employer]’s FollowMyHealth portal, you will also be able to view your health information using other applications (apps) compatible with our system.

## 2024-05-02 NOTE — DISCHARGE NOTE NURSING/CASE MANAGEMENT/SOCIAL WORK - NSDCPEFALRISK_GEN_ALL_CORE
For information on Fall & Injury Prevention, visit: [email or web address] OR  [email or web address] OR  https://www.cdc.gov/steadi/patient.html

## 2024-05-09 DIAGNOSIS — J44.9 CHRONIC OBSTRUCTIVE PULMONARY DISEASE, UNSPECIFIED: ICD-10-CM

## 2024-05-09 DIAGNOSIS — I48.20 CHRONIC ATRIAL FIBRILLATION, UNSPECIFIED: ICD-10-CM

## 2024-05-09 DIAGNOSIS — E87.70 FLUID OVERLOAD, UNSPECIFIED: ICD-10-CM

## 2024-05-09 DIAGNOSIS — G47.33 OBSTRUCTIVE SLEEP APNEA (ADULT) (PEDIATRIC): ICD-10-CM

## 2024-05-09 DIAGNOSIS — I44.7 LEFT BUNDLE-BRANCH BLOCK, UNSPECIFIED: ICD-10-CM

## 2024-05-09 DIAGNOSIS — I24.89 OTHER FORMS OF ACUTE ISCHEMIC HEART DISEASE: ICD-10-CM

## 2024-05-09 DIAGNOSIS — E87.20 ACIDOSIS, UNSPECIFIED: ICD-10-CM

## 2024-05-09 DIAGNOSIS — N18.6 END STAGE RENAL DISEASE: ICD-10-CM

## 2024-05-09 DIAGNOSIS — I50.22 CHRONIC SYSTOLIC (CONGESTIVE) HEART FAILURE: ICD-10-CM

## 2024-05-09 DIAGNOSIS — Z79.4 LONG TERM (CURRENT) USE OF INSULIN: ICD-10-CM

## 2024-05-09 DIAGNOSIS — N17.9 ACUTE KIDNEY FAILURE, UNSPECIFIED: ICD-10-CM

## 2024-05-09 DIAGNOSIS — E11.51 TYPE 2 DIABETES MELLITUS WITH DIABETIC PERIPHERAL ANGIOPATHY WITHOUT GANGRENE: ICD-10-CM

## 2024-05-09 DIAGNOSIS — Z99.2 DEPENDENCE ON RENAL DIALYSIS: ICD-10-CM

## 2024-05-09 DIAGNOSIS — B96.5 PSEUDOMONAS (AERUGINOSA) (MALLEI) (PSEUDOMALLEI) AS THE CAUSE OF DISEASES CLASSIFIED ELSEWHERE: ICD-10-CM

## 2024-05-09 DIAGNOSIS — I13.2 HYPERTENSIVE HEART AND CHRONIC KIDNEY DISEASE WITH HEART FAILURE AND WITH STAGE 5 CHRONIC KIDNEY DISEASE, OR END STAGE RENAL DISEASE: ICD-10-CM

## 2024-05-09 DIAGNOSIS — Z90.49 ACQUIRED ABSENCE OF OTHER SPECIFIED PARTS OF DIGESTIVE TRACT: ICD-10-CM

## 2024-05-09 DIAGNOSIS — J96.21 ACUTE AND CHRONIC RESPIRATORY FAILURE WITH HYPOXIA: ICD-10-CM

## 2024-05-09 DIAGNOSIS — Z95.820 PERIPHERAL VASCULAR ANGIOPLASTY STATUS WITH IMPLANTS AND GRAFTS: ICD-10-CM

## 2024-05-09 DIAGNOSIS — Z95.2 PRESENCE OF PROSTHETIC HEART VALVE: ICD-10-CM

## 2024-05-09 DIAGNOSIS — Z85.038 PERSONAL HISTORY OF OTHER MALIGNANT NEOPLASM OF LARGE INTESTINE: ICD-10-CM

## 2024-05-09 DIAGNOSIS — I25.10 ATHEROSCLEROTIC HEART DISEASE OF NATIVE CORONARY ARTERY WITHOUT ANGINA PECTORIS: ICD-10-CM

## 2024-05-09 DIAGNOSIS — I71.40 ABDOMINAL AORTIC ANEURYSM, WITHOUT RUPTURE, UNSPECIFIED: ICD-10-CM

## 2024-05-09 DIAGNOSIS — Z93.3 COLOSTOMY STATUS: ICD-10-CM

## 2024-05-09 DIAGNOSIS — Z79.02 LONG TERM (CURRENT) USE OF ANTITHROMBOTICS/ANTIPLATELETS: ICD-10-CM

## 2024-05-09 DIAGNOSIS — Z89.422 ACQUIRED ABSENCE OF OTHER LEFT TOE(S): ICD-10-CM

## 2024-05-09 DIAGNOSIS — Z79.01 LONG TERM (CURRENT) USE OF ANTICOAGULANTS: ICD-10-CM

## 2024-05-09 DIAGNOSIS — E11.22 TYPE 2 DIABETES MELLITUS WITH DIABETIC CHRONIC KIDNEY DISEASE: ICD-10-CM

## 2024-05-09 DIAGNOSIS — E11.69 TYPE 2 DIABETES MELLITUS WITH OTHER SPECIFIED COMPLICATION: ICD-10-CM

## 2024-05-09 DIAGNOSIS — I25.5 ISCHEMIC CARDIOMYOPATHY: ICD-10-CM

## 2024-05-09 DIAGNOSIS — D50.9 IRON DEFICIENCY ANEMIA, UNSPECIFIED: ICD-10-CM

## 2024-05-09 DIAGNOSIS — E87.3 ALKALOSIS: ICD-10-CM

## 2024-05-09 DIAGNOSIS — J81.1 CHRONIC PULMONARY EDEMA: ICD-10-CM

## 2024-05-09 DIAGNOSIS — Z88.1 ALLERGY STATUS TO OTHER ANTIBIOTIC AGENTS STATUS: ICD-10-CM

## 2024-05-09 DIAGNOSIS — Z99.81 DEPENDENCE ON SUPPLEMENTAL OXYGEN: ICD-10-CM

## 2024-05-09 DIAGNOSIS — M86.9 OSTEOMYELITIS, UNSPECIFIED: ICD-10-CM

## 2024-09-19 NOTE — DISCHARGE NOTE PROVIDER - NSDCMRMEDTOKEN_GEN_ALL_CORE_FT
Interventional Radiology    Evaluate for Procedure: drainage of duodenal stump blowout     HPI:  47yM with PMH of Asthma, Lupus/myositis overlap syndrome, Hypothyroidism, Gastric cancer, PSH Robotic distal gastrectomy w/ Heather en Y reconstruction, D2 lymphadenectomy on 8/20/2024 p/w generalized malaise for last few weeks. Patient states that since discharge on 8/27 patient has been consistently fatigued w consistent abdominal pain that has not improved. Abdominal pain has not worsened either.    ALLERGIES:  No Known Allergies      MEDICATIONS:        VITALS & I/Os:  Vital Signs Last 24 Hrs  T(C): 37.2 (12 Sep 2024 09:36), Max: 37.2 (12 Sep 2024 09:36)  T(F): 98.9 (12 Sep 2024 09:36), Max: 98.9 (12 Sep 2024 09:36)  HR: 90 (12 Sep 2024 10:41) (90 - 95)  BP: 118/76 (12 Sep 2024 10:41) (103/63 - 118/76)  BP(mean): --  RR: 17 (12 Sep 2024 10:41) (16 - 17)  SpO2: 100% (12 Sep 2024 10:41) (99% - 100%)    Data:    -WBC 10.26 / HgB 11.0 / Hct 34.0 / Plt 457  -Na 137 / Cl 101 / BUN 8 / Glucose 85  -K 4.7 / CO2 20 / Cr 1.04  -ALT 21 / Alk Phos 97 / T.Bili 0.4  -INR 1.23 / PTT 30.4    Radiology: CT abdomen and pelvis reviewed from 9/12,  loculated collection with air-fluid level adjacent stranding along the duodenal staple line.    Assessment/Plan:   47yM with PMH of Asthma, Lupus/myositis overlap syndrome, Hypothyroidism, Gastric cancer, PSH Robotic distal gastrectomy w/ Heather en Y reconstruction, D2 lymphadenectomy on 8/20/2024 p/w generalized malaise for last few weeks. CT showing collection at the duodenal stump, concerning for duodenal stump blowout. IR consulted for drainage.      - case reviewed and approved for 9/13  - please place IR procedure order under Dr. Mahoney  - STAT labs in AM (cbc,coags, bmp, T&S)  - hold AC x 12 hours  - NPO at midnight tonight  - discussed with surgery    Nitin Underwood MD  PGY-III, Diagnostic Radiology Resident    -Available on rag & bone TEAMS  -Emergent issues: [facility]-p.[phone]; [facility]-p.[phone] ([phone])  -Non-emergent consults: Please place a [place] order "IR Consult" with an appropriate callback number  -Scheduling questions: [facility]: [phone]; [facility]: [phone]  -Clinic/Outpatient booking: [facility]: [phone]; [facility]: [phone]     AMBERLY ESPITIA  [id number]    Hospital course    Patient with SLE with overlap myositis , recently diagnosed with adenocarcinoma of stomach s/p subtotal gastrectomy 9/5, found to have an anastamosis leak getting an IR drain this hospitalisation. Rheum consulted for back ground of AID.    Patient complains of diffuse body pain and stiffness that feels similar to his lupus flare. He also feels weak overall. Endorses muscle fatigue on climbing up and down stairs, getting up from chair, keeping his arm up over the shoulders for long period of time. Denies rash, oral ulcers, joint swelling , diarrhea, cough, SOB, dry eyes, dry mouth    Rheum hx:   SLE with myositis previously treated with cellcept, steroids and benlysta. Stopped benlysta in January 2024, stopped Cellcept in august as he was planned for gastric surgery, only on prednisone 7.5mg  -Previous serologies: OWEN, dsDNA, smith, LA, U1RNP, PMscl. Ck have fluctuated 100s -200s.      MEDICATIONS  (STANDING):  acetaminophen   IVPB .. 1000 milliGRAM(s) IV Intermittent every 6 hours  hydroxychloroquine 400 milliGRAM(s) Oral at bedtime  lactated ringers. 1000 milliLiter(s) (75 mL/Hr) IV Continuous <Continuous>  levothyroxine 125 MICROGram(s) Oral daily  losartan 50 milliGRAM(s) Oral daily  piperacillin/tazobactam IVPB.. 3.375 Gram(s) IV Intermittent every 8 hours  predniSONE   Tablet 7.5 milliGRAM(s) Oral daily    MEDICATIONS  (PRN):  HYDROmorphone  Injectable 0.2 milliGRAM(s) IV Push every 4 hours PRN Severe Pain (7 - 10)      Allergies    No Known Allergies    Intolerances        PERTINENT MEDICATION HISTORY:    SOCIAL HISTORY:  OCCUPATION:  TRAVEL HISTORY:    FAMILY HISTORY:  FH: rheumatoid arthritis (Mother)        Vital Signs Last 24 Hrs  T(C): 36.7 (13 Sep 2024 20:22), Max: 36.9 (13 Sep 2024 05:21)  T(F): 98 (13 Sep 2024 20:22), Max: 98.5 (13 Sep 2024 05:21)  HR: 73 (13 Sep 2024 20:22) (66 - 81)  BP: 111/71 (13 Sep 2024 20:22) (96/62 - 121/60)  BP(mean): 61 (13 Sep 2024 18:08) (61 - 61)  RR: 19 (13 Sep 2024 20:22) (15 - 19)  SpO2: 100% (13 Sep 2024 20:22) (97% - 100%)    Parameters below as of 13 Sep 2024 20:22  Patient On (Oxygen Delivery Method): room air        Physical Exam:  General: No apparent distress  HEENT: EOMI, MMM  CVS: +S1/S2, RRR, no murmurs/rubs/gallops  Resp: CTA b/l. No crackles/wheezing  GI: Soft, NT/ND +BS  MSK: tenderness in all PIPs without synovitis. Tenderness in bilateral wrist without swelling, full ROM  Neuro: AAOx3. Bilateral lower limb proximal muscle 4/5  Skin: no visible rashes    LABS:                        10.8   8.50  )-----------( 369      ( 13 Sep 2024 06:40 )             32.7     09-13    133<L>  |  100  |  7   ----------------------------<  89  3.9   |  23  |  1.03    Ca    8.6      13 Sep 2024 06:40  Phos  3.0     09-13  Mg     1.90     09-13    TPro  7.8  /  Alb  3.7  /  TBili  0.4  /  DBili  x   /  AST  29  /  ALT  21  /  AlkPhos  97  09-12    PT/INR - ( 13 Sep 2024 06:40 )   PT: 13.5 sec;   INR: 1.21 ratio         PTT - ( 13 Sep 2024 06:40 )  PTT:30.8 sec  Urinalysis Basic - ( 13 Sep 2024 06:40 )    Color: x / Appearance: x / SG: x / pH: x  Gluc: 89 mg/dL / Ketone: x  / Bili: x / Urobili: x   Blood: x / Protein: x / Nitrite: x   Leuk Esterase: x / RBC: x / WBC x   Sq Epi: x / Non Sq Epi: x / Bacteria: x            Rheumatology Work Up    Protein/Creatinine Ratio Calculation: 0.1 Ratio [0.0 - 0.2] (07-21-23 @ 07:22)  C3 Complement, Serum: 108 mg/dL [81 - 157] (07-21-23 @ 07:17)  C4 Complement, Serum: 21 mg/dL [13 - 39] (07-21-23 @ 07:17)  Sedimentation Rate, Erythrocyte: 33 mm/hr *H* [0 - 15] (07-21-23 @ 07:17)        RADIOLOGY & ADDITIONAL STUDIES:     Cozaar 25 mg oral tablet: 1 tab(s) orally once a day  Crestor 20 mg oral tablet: 1 tab(s) orally once a day (at bedtime)  Eliquis 5 mg oral tablet: 1 tab(s) orally once a day  Lasix 40 mg oral tablet: 1 tab(s) orally once a day  Plavix 75 mg oral tablet: 1 tab(s) orally once a day  sodium bicarbonate 650 mg oral tablet: 1 tab(s) orally 2 times a day  Toprol-XL 25 mg oral tablet, extended release: 1 tab(s) orally once a day  Zoloft 50 mg oral tablet: 1 tab(s) orally once a day   [facility]  Gaye Mccloud, Palliative Care Attending  Contact Info: Page [phone] (including Nights/Weekends), message on Microsoft Teams (Gaye Mccloud), or leave VM at Palliative Office [phone] (non-urgent)     Date of [id number] @ 13:31  HPI:  47yM with PMH of Asthma, Lupus/myositis overlap syndrome, Hypothyroidism, Gastric cancer, PSH Robotic distal gastrectomy w/ Heather en Y reconstruction, D2 lymphadenectomy on 8/20/2024 p/w generalized malaise for last few weeks. Patient states that since discharge on 8/27 patient has been consistently fatigued w consistent abdominal pain that has not improved. Abdominal pain has not worsened either.    Patient denies fevers, chills, SOB, CP.  (12 Sep 2024 14:22)      PAST MEDICAL & SURGICAL HISTORY:  Lupus  Asthma  Hypothyroid  Lumbosacral stenosis  Mild obstructive sleep apnea  H/O compression fracture of spine  H. pylori infection  Osteoporosis  Prediabetes  Obesity  Myositis  History of cholecystectomy  H/O endoscopy  S/P vasectomy  H/O colonoscopy    FAMILY HISTORY:  FH: rheumatoid arthritis (Mother)    Family Hx substance abuse [ ]yes [ ]no  ITEMS NOT CHECKED ARE NOT PRESENT    SOCIAL HISTORY:  Patient works as [employer] - 22 years   Significant other/partner[x ]  Children[x- 8 children total (4 and 4y/o with Carine, 6 other children from another relationship ]  Faith/Spirituality:  Substance hx:  [ ]   Tobacco hx:  [ ]   Alcohol hx: [ ]   Home Opioid hx:  [ ] I-Stop Reference No: This report was requested by: Gaye Mccloud | Reference #: [id number]    Practitioner Count: 3  Pharmacy Count: 3  Current Opioid Prescriptions: 0  Current Benzodiazepine Prescriptions: 0  Current Stimulant Prescriptions: 0      Patient Demographic Information (PDI)       PDI	First Name	Last Name	Birth Date	Gender	Street Address	[facility]	Zip Code  A	Francisco Javier Farro	1977	Male	[address]	[address]  B	Francisco Javier Farro	1977	Male	[address]	[address]    Prescription Information      PDI Filter:    PDI	My Rx	Current Rx	Drug Type	Rx Written	Rx Dispensed	Drug	Quantity	Days Supply	Prescriber Name	Prescriber BEE #	Payment Method	Dispenser  A	N	N	O	09/10/2024	09/10/2024	hydromorphone 2 mg tablet	20	5	Radha [id number]	Insurance	[facility]  A	N	N	O	08/27/2024	08/27/2024	hydromorphone 2 mg tablet	20	5	Josette Tejada	[id number]	Insurance	[facility]  B	N	N	B	07/02/2024	07/03/2024	alprazolam 0.5 mg tablet	30	30	Susanna Neri	[id number]	Insurance	[id number]  Living Situation: [x ]Home  [ ]Long term care  [ ]Rehab [ ]Other    ADVANCE DIRECTIVES:    DNR/MOLST  [ ]  Living Will  [ ]   DECISION MAKER(s): Patient   [ ] Health Care Proxy(s)  [x ] Surrogate(s)  [ ] Guardian           Name(s): Carine Darby Phone Number(s): [phone]    BASELINE (I)ADL(s) (prior to admission):  [place]: [x ]Total  [ ] Moderate [ ]Dependent    Allergies    No Known Allergies    Intolerances    MEDICATIONS  (STANDING):  fentaNYL   Patch  12 MICROgram(s)/Hr 1 Patch Transdermal every 72 hours  heparin   Injectable 5000 Unit(s) SubCutaneous every 8 hours  hydroxychloroquine 400 milliGRAM(s) Oral at bedtime  levothyroxine 125 MICROGram(s) Oral daily  losartan 50 milliGRAM(s) Oral daily  melatonin 6 milliGRAM(s) Oral at bedtime  piperacillin/tazobactam IVPB.. 3.375 Gram(s) IV Intermittent every 8 hours  predniSONE   Tablet 7.5 milliGRAM(s) Oral daily    MEDICATIONS  (PRN):  cyclobenzaprine 5 milliGRAM(s) Oral three times a day PRN Muscle Spasm  HYDROmorphone   Tablet 4 milliGRAM(s) Oral every 4 hours PRN Severe Pain (7 - 10)  HYDROmorphone   Tablet 2 milliGRAM(s) Oral every 4 hours PRN Moderate Pain (4 - 6)  ondansetron Injectable 4 milliGRAM(s) IV Push three times a day PRN Nausea    PRESENT SYMPTOMS: [ ]Unable to self-report  [ ] CPOT [ ] PAINADs [ ] RDOS  Source if other than patient:  [ ]Family   [ ]Team     Pain: [x ]yes [ ]no  QOL impact - unable to take a deep breath, difficulty performing ADLs without pain   Location -  at the site of ZONIA drain   Aggravating factors - positional and with deep inspiration   Quality - sharp   Radiation - denies   Timing- constant   Severity (0-10 scale): 8  Minimal acceptable level/pain goal (0-10 scale): 4    CPOT:    https://www.[facility].org/getattachment/[email or web address]/Critical-Care-Pain-Observation-Tool-(CPOT)    Dyspnea:                           [ ]Mild [ ]Moderate [ ]Severe  Anxiety:                             [ ]Mild [ ]Moderate [ ]Severe  Fatigue:                             [x ]Mild [ ]Moderate [ ]Severe  Nausea:                             [ ]Mild [ ]Moderate [ ]Severe  Loss of appetite:              [ ]Mild [ ]Moderate [ ]Severe  Constipation:                    [ ]Mild [ ]Moderate [ ]Severe    Other Symptoms:  [x ]All other review of systems negative     PCSSQ[Palliative Care Spiritual Screening Question]   Severity (0-10):  Chaplaincy Referral: [ ] yes [ ] refused [ ] following [x ] deferred     Caregiver [place]? : [ ] yes [ ] no [x ] Deferred [ ] Declined             Social work referral [ ] Patient & Family Centered Care Referral [ ]  Anticipatory Grief present?:  [ ] yes [ ] no  [x ] Deferred                  Social work referral [ ] Patient & Family Centered Care Referral [ ]    PHYSICAL EXAM:  Vital Signs Last 24 Hrs  T(C): 37 (19 Sep 2024 12:06), Max: 37.1 (19 Sep 2024 00:00)  T(F): 98.6 (19 Sep 2024 12:06), Max: 98.8 (19 Sep 2024 00:00)  HR: 71 (19 Sep 2024 12:06) (65 - 81)  BP: 132/81 (19 Sep 2024 12:06) (101/57 - 132/81)  BP(mean): --  RR: 18 (19 Sep 2024 12:06) (18 - 18)  SpO2: 97% (19 Sep 2024 12:06) (95% - 100%)    Parameters below as of 19 Sep 2024 12:06  Patient On (Oxygen Delivery Method): room air     I&O's Summary    18 Sep 2024 07:01  -  19 Sep 2024 07:00  --------------------------------------------------------  IN: 240 mL / OUT: 1669 mL / NET: -1429 mL    19 Sep 2024 07:01  -  19 Sep 2024 13:31  --------------------------------------------------------  IN: 240 mL / OUT: 350 mL / NET: -110 mL      GENERAL: [ ]Cachexia    [x ]Alert  [x ]Oriented x 4  [ ]Lethargic  [ ]Unarousable  [x ]Verbal  [ ]Non-Verbal  Behavioral:   [ ] Anxiety  [ ] Delirium [ ] Agitation [x ] Other  HEENT:  [x ]Normal   [ ]Dry mouth   [ ]ET Tube/Trach  [ ]Oral lesions  PULMONARY:   [x ]Clear [ ]Tachypnea  [ ]Audible excessive secretions   [ ]Rhonchi        [ ]Right [ ]Left [ ]Bilateral  [ ]Crackles        [ ]Right [ ]Left [ ]Bilateral  [ ]Wheezing     [ ]Right [ ]Left [ ]Bilateral  [ ]Diminished breath sounds [ ]right [ ]left [ ]bilateral  CARDIOVASCULAR:    [ x]Regular [ ]Irregular [ ]Tachy  [ ]Edwardo [ ]Murmur [ ]Other  GASTROINTESTINAL:  [ x]Soft  [ ]Distended   [ ]+BS  [ x]Non tender [ ]Tender  [ ]Other [ ]PEG [ ]OGT/ NGT  Last BM: 9/18  GENITOURINARY:  [x ]Normal [ ] Incontinent   [ ]Oliguria/Anuria   [ ]Reich  MUSCULOSKELETAL:   [x ]Normal   [ ]Weakness  [ ]Bed/Wheelchair bound [ ]Edema  NEUROLOGIC:   [x ]No focal deficits  [ ]Cognitive impairment  [ ]Dysphagia [ ]Dysarthria [ ]Paresis [ ]Other   SKIN: Please see flowsheets   [ ]Normal  [ ]Rash  [ x]Other - ZONIA drain in place   [ ]Pressure ulcer(s)       Present on admission [ ]y [ ]n    CRITICAL CARE:  [ ] Shock Present  [ ]Septic [ ]Cardiogenic [ ]Neurologic [ ]Hypovolemic  [ ]  Vasopressors [ ]  Inotropes   [ ]Respiratory failure present [ ]Mechanical ventilation [ ]Non-invasive ventilatory support [ ]High flow    [ ]Acute  [ ]Chronic [ ]Hypoxic  [ ]Hypercarbic [ ]Other  [ ]Other organ failure     LABS:                        10.0   9.31  )-----------( 310      ( 19 Sep 2024 06:10 )             31.0   09-19    139  |  101  |  6[L]  ----------------------------<  83  3.9   |  27  |  0.97    Ca    8.5      19 Sep 2024 06:10  Phos  3.4     09-19  Mg     2.00     09-19        Urinalysis Basic - ( 19 Sep 2024 06:10 )    Color: x / Appearance: x / SG: x / pH: x  Gluc: 83 mg/dL / Ketone: x  / Bili: x / Urobili: x   Blood: x / Protein: x / Nitrite: x   Leuk Esterase: x / RBC: x / WBC x   Sq Epi: x / Non Sq Epi: x / Bacteria: x      RADIOLOGY & ADDITIONAL STUDIES: < from: CT Abdomen and Pelvis w/ IV Cont (09.18.24 @ 11:20) >  IMPRESSION:  Interval percutaneous drainage of a intraperitoneal fluid collection   adjacent to the duodenal staple line which is decreased in size.   Additional more superficial fluid collection just deep to the right   internal oblique muscles is increased in size measuring up to 4.0 cm,   previously up to 3.1 cm.    --- End ofReport ---    < end of copied text >      PROTEIN CALORIE MALNUTRITION PRESENT: [ ]mild [ ]moderate [ ]severe [ ]underweight [ ]morbid obesity  https://www.andeal.org/vault/9980/web/files/ONC/Table_Clinical%20Characteristics%20to%20Document%20Malnutrition-White%20JV%20et%20al%202012.pdf    Height (cm): 172.7 (09-12-24 @ 09:36), 172.7 (08-20-24 @ 07:30), 172.7 (08-05-24 @ 15:06)  Weight (kg): 108.9 (09-12-24 @ 09:36), 111.1 (08-20-24 @ 07:30), 111.1 (08-05-24 @ 15:06)  BMI (kg/m2): 36.5 (09-12-24 @ 09:36), 37.3 (08-20-24 @ 07:30), 37.3 (08-05-24 @ 15:06)    [ ]PPSV2 < or = to 30% [ ]significant weight loss  [ ]poor nutritional intake  [ ]anasarca[ ]Artificial Nutrition      Other REFERRALS:  [ ]Hospice  [ ]Child Life  [ ]Social Work  [ ]Case management [ ]Holistic Therapy    Bedside Table: In need of Bedside Table  Cozaar 25 mg oral tablet: 1 tab(s) orally once a day  Crestor 20 mg oral tablet: 1 tab(s) orally once a day (at bedtime)  Eliquis 5 mg oral tablet: 1 tab(s) orally once a day  Lasix 40 mg oral tablet: 1 tab(s) orally once a day  Plavix 75 mg oral tablet: 1 tab(s) orally once a day  Shower Chair: Patient in need of Shower Chair  sodium bicarbonate 650 mg oral tablet: 1 tab(s) orally 2 times a day  Toprol-XL 25 mg oral tablet, extended release: 1 tab(s) orally once a day  Zoloft 50 mg oral tablet: 1 tab(s) orally once a day   Bedside Table: In need of Bedside Table  carvedilol 6.25 mg oral tablet: 1 tab(s) orally every 12 hours  Cozaar 25 mg oral tablet: 1 tab(s) orally once a day  Crestor 20 mg oral tablet: 1 tab(s) orally once a day (at bedtime)  Eliquis 5 mg oral tablet: 1 tab(s) orally once a day  epoetin ori: 10,000 unit(s) once a week weekly  gabapentin 100 mg oral capsule: 1 cap(s) orally every 8 hours  Lasix 40 mg oral tablet: 1 tab(s) orally once a day  oxyCODONE 5 mg oral tablet: 1 tab(s) orally every 6 hours As needed Moderate Pain (4 - 6)  oxyCODONE 7.5 mg oral tablet: 1 tab(s) orally every 6 hours As needed Severe Pain (7 - 10)  piperacillin-tazobactam: 4.5 gram(s) every 12 hours 4.5 grams IV q12 x 6 weeks (3/29/24 - 5/10/24)  Plavix 75 mg oral tablet: 1 tab(s) orally once a day  Shower Chair: Patient in need of Shower Chair  sodium bicarbonate 650 mg oral tablet: 1 tab(s) orally 2 times a day  Toprol-XL 25 mg oral tablet, extended release: 1 tab(s) orally once a day  Zoloft 50 mg oral tablet: 1 tab(s) orally once a day   Bedside Table: In need of Bedside Table  carvedilol 6.25 mg oral tablet: 1 tab(s) orally every 12 hours  Cozaar 25 mg oral tablet: 1 tab(s) orally once a day  Crestor 20 mg oral tablet: 1 tab(s) orally once a day (at bedtime)  Eliquis 5 mg oral tablet: 1 tab(s) orally once a day  epoetin ori: 10,000 unit(s) once a week weekly  gabapentin 100 mg oral capsule: 1 cap(s) orally every 8 hours  Lasix 40 mg oral tablet: 1 tab(s) orally once a day  oxyCODONE 5 mg oral tablet: 1 tab(s) orally every 8 hours as needed for  severe pain MDD: 3 tablets  oxyCODONE 5 mg oral tablet: 1 tab(s) orally every 6 hours As needed Moderate Pain (4 - 6)  oxyCODONE 7.5 mg oral tablet: 1 tab(s) orally every 6 hours As needed Severe Pain (7 - 10)  piperacillin-tazobactam: 4.5 gram(s) every 12 hours 4.5 grams IV q12 x 6 weeks (3/29/24 - 5/10/24)  Plavix 75 mg oral tablet: 1 tab(s) orally once a day  Shower Chair: Patient in need of Shower Chair  sodium bicarbonate 650 mg oral tablet: 1 tab(s) orally 2 times a day  Toprol-XL 25 mg oral tablet, extended release: 1 tab(s) orally once a day  Zoloft 50 mg oral tablet: 1 tab(s) orally once a day   Bedside Table: In need of Bedside Table  carvedilol 6.25 mg oral tablet: 1 tab(s) orally every 12 hours  Cozaar 25 mg oral tablet: 1 tab(s) orally once a day  Crestor 20 mg oral tablet: 1 tab(s) orally once a day (at bedtime)  Eliquis 5 mg oral tablet: 1 tab(s) orally once a day  epoetin ori: 10,000 unit(s) once a week weekly  gabapentin 100 mg oral capsule: 1 cap(s) orally every 8 hours  Lasix 40 mg oral tablet: 1 tab(s) orally once a day  oxyCODONE 5 mg oral tablet: 1 tab(s) orally every 6 hours As needed Moderate Pain (4 - 6)  oxyCODONE 7.5 mg oral tablet: 1 tab(s) orally every 6 hours As needed Severe Pain (7 - 10)  piperacillin-tazobactam: 4.5 gram(s) every 12 hours 4.5 grams IV q12 x 6 weeks (3/29/24 - 5/10/24)  Plavix 75 mg oral tablet: 1 tab(s) orally once a day  Shower Chair: Patient in need of Shower Chair  sodium bicarbonate 650 mg oral tablet: 2 tab(s) orally 3 times a day  Toprol-XL 25 mg oral tablet, extended release: 1 tab(s) orally once a day  Zoloft 50 mg oral tablet: 1 tab(s) orally once a day

## 2025-02-06 NOTE — PACU DISCHARGE NOTE - NSPTMEETSDISCHCRITERIADT_GEN_A_CORE
29-Mar-2024 17:00 [FreeTextEntry3] : I, Genoveva Wang, acted solely as scribe for Dr. See Stewart DO on this date 02/06/2025.  All medical record entries made by the Scribe were at my, Dr. See Stewart DO direction and personally dictated by me on 02/06/2025, I have reviewed the chart and agree that the record accurately reflects my personal performance of the history, physical exam, assessment and plan. I have also personally directed, reviewed and agreed with the chart.   [Time Spent: ___ minutes] : I have spent [unfilled] minutes of time on the encounter which excludes teaching and separately reported services.

## 2025-04-22 ENCOUNTER — RX RENEWAL (OUTPATIENT)
Age: 70
End: 2025-04-22

## 2025-05-20 ENCOUNTER — APPOINTMENT (OUTPATIENT)
Dept: NEPHROLOGY | Facility: CLINIC | Age: 70
End: 2025-05-20

## (undated) DEVICE — PACK ORTHO FOOT ANKLE

## (undated) DEVICE — WARMING BLANKET UPPER ADULT

## (undated) DEVICE — IRR SYS BONE CLNNG INTERPULSE

## (undated) DEVICE — VENODYNE/SCD SLEEVE CALF MEDIUM

## (undated) DEVICE — GLV 7.5 PROTEXIS (WHITE)

## (undated) DEVICE — SAW BLADE LINVATEC SAGITTAL MIC 9.5X25.5X0.4MM